# Patient Record
Sex: MALE | Race: BLACK OR AFRICAN AMERICAN | NOT HISPANIC OR LATINO | URBAN - METROPOLITAN AREA
[De-identification: names, ages, dates, MRNs, and addresses within clinical notes are randomized per-mention and may not be internally consistent; named-entity substitution may affect disease eponyms.]

---

## 2023-01-30 VITALS
WEIGHT: 216.05 LBS | RESPIRATION RATE: 18 BRPM | DIASTOLIC BLOOD PRESSURE: 95 MMHG | SYSTOLIC BLOOD PRESSURE: 166 MMHG | OXYGEN SATURATION: 96 % | HEIGHT: 67 IN | HEART RATE: 130 BPM | TEMPERATURE: 98 F

## 2023-01-30 LAB
APTT BLD: 31.2 SEC — SIGNIFICANT CHANGE UP (ref 27.5–35.5)
BASE EXCESS BLDV CALC-SCNC: 0 MMOL/L — SIGNIFICANT CHANGE UP (ref -2–3)
BASOPHILS # BLD AUTO: 0.09 K/UL — SIGNIFICANT CHANGE UP (ref 0–0.2)
BASOPHILS NFR BLD AUTO: 0.6 % — SIGNIFICANT CHANGE UP (ref 0–2)
BUN SERPL-MCNC: 14 MG/DL — SIGNIFICANT CHANGE UP (ref 7–23)
CA-I SERPL-SCNC: 1.22 MMOL/L — SIGNIFICANT CHANGE UP (ref 1.15–1.33)
CALCIUM SERPL-MCNC: 9 MG/DL — SIGNIFICANT CHANGE UP (ref 8.4–10.5)
CO2 BLDV-SCNC: 26.7 MMOL/L — HIGH (ref 22–26)
CO2 SERPL-SCNC: 24 MMOL/L — SIGNIFICANT CHANGE UP (ref 22–31)
CREAT SERPL-MCNC: 1.21 MG/DL — SIGNIFICANT CHANGE UP (ref 0.5–1.3)
EGFR: 68 ML/MIN/1.73M2 — SIGNIFICANT CHANGE UP
EOSINOPHIL # BLD AUTO: 0.12 K/UL — SIGNIFICANT CHANGE UP (ref 0–0.5)
EOSINOPHIL NFR BLD AUTO: 0.7 % — SIGNIFICANT CHANGE UP (ref 0–6)
GAS PNL BLDV: 131 MMOL/L — LOW (ref 136–145)
GAS PNL BLDV: SIGNIFICANT CHANGE UP
GLUCOSE SERPL-MCNC: 259 MG/DL — HIGH (ref 70–99)
HCO3 BLDV-SCNC: 25 MMOL/L — SIGNIFICANT CHANGE UP (ref 22–29)
HCT VFR BLD CALC: 42.7 % — SIGNIFICANT CHANGE UP (ref 39–50)
HGB BLD-MCNC: 14.5 G/DL — SIGNIFICANT CHANGE UP (ref 13–17)
IMM GRANULOCYTES NFR BLD AUTO: 0.4 % — SIGNIFICANT CHANGE UP (ref 0–0.9)
INR BLD: 0.98 — SIGNIFICANT CHANGE UP (ref 0.88–1.16)
LYMPHOCYTES # BLD AUTO: 1.02 K/UL — SIGNIFICANT CHANGE UP (ref 1–3.3)
LYMPHOCYTES # BLD AUTO: 6.3 % — LOW (ref 13–44)
MAGNESIUM SERPL-MCNC: 1.9 MG/DL — SIGNIFICANT CHANGE UP (ref 1.6–2.6)
MCHC RBC-ENTMCNC: 30.3 PG — SIGNIFICANT CHANGE UP (ref 27–34)
MCHC RBC-ENTMCNC: 34 GM/DL — SIGNIFICANT CHANGE UP (ref 32–36)
MCV RBC AUTO: 89.3 FL — SIGNIFICANT CHANGE UP (ref 80–100)
MONOCYTES # BLD AUTO: 1.05 K/UL — HIGH (ref 0–0.9)
MONOCYTES NFR BLD AUTO: 6.5 % — SIGNIFICANT CHANGE UP (ref 2–14)
NEUTROPHILS # BLD AUTO: 13.85 K/UL — HIGH (ref 1.8–7.4)
NEUTROPHILS NFR BLD AUTO: 85.5 % — HIGH (ref 43–77)
NRBC # BLD: 0 /100 WBCS — SIGNIFICANT CHANGE UP (ref 0–0)
PCO2 BLDV: 43 MMHG — SIGNIFICANT CHANGE UP (ref 42–55)
PH BLDV: 7.38 — SIGNIFICANT CHANGE UP (ref 7.32–7.43)
PLATELET # BLD AUTO: 345 K/UL — SIGNIFICANT CHANGE UP (ref 150–400)
PO2 BLDV: 69 MMHG — HIGH (ref 25–45)
POTASSIUM BLDV-SCNC: 4.5 MMOL/L — SIGNIFICANT CHANGE UP (ref 3.5–5.1)
PROTHROM AB SERPL-ACNC: 11.7 SEC — SIGNIFICANT CHANGE UP (ref 10.5–13.4)
RBC # BLD: 4.78 M/UL — SIGNIFICANT CHANGE UP (ref 4.2–5.8)
RBC # FLD: 13.2 % — SIGNIFICANT CHANGE UP (ref 10.3–14.5)
SAO2 % BLDV: 94.3 % — HIGH (ref 67–88)
WBC # BLD: 16.19 K/UL — HIGH (ref 3.8–10.5)
WBC # FLD AUTO: 16.19 K/UL — HIGH (ref 3.8–10.5)

## 2023-01-30 PROCEDURE — 71045 X-RAY EXAM CHEST 1 VIEW: CPT | Mod: 26

## 2023-01-30 PROCEDURE — 99285 EMERGENCY DEPT VISIT HI MDM: CPT

## 2023-01-30 RX ORDER — FUROSEMIDE 40 MG
60 TABLET ORAL ONCE
Refills: 0 | Status: COMPLETED | OUTPATIENT
Start: 2023-01-30 | End: 2023-01-30

## 2023-01-30 RX ORDER — IPRATROPIUM/ALBUTEROL SULFATE 18-103MCG
3 AEROSOL WITH ADAPTER (GRAM) INHALATION
Refills: 0 | Status: COMPLETED | OUTPATIENT
Start: 2023-01-30 | End: 2023-01-30

## 2023-01-30 RX ADMIN — Medication 3 MILLILITER(S): at 23:21

## 2023-01-30 RX ADMIN — Medication 125 MILLIGRAM(S): at 23:21

## 2023-01-30 RX ADMIN — Medication 3 MILLILITER(S): at 23:20

## 2023-01-30 RX ADMIN — Medication 3 MILLILITER(S): at 23:26

## 2023-01-30 RX ADMIN — Medication 60 MILLIGRAM(S): at 23:17

## 2023-01-30 NOTE — ED ADULT NURSE NOTE - OBJECTIVE STATEMENT
Pt presents to ED with shortness of breath. Pt reports feeling like he has had a cold for a few days (congestion/cough). Pt reports his breathing got worse. Hx CHF and reports this feels similar to exacerbations. Denies chest pain, nausea/vomiting, fevers, chills, diarrhea. 's and placed on 3L nc. EKG obtained. Placed on cardiac monitor.

## 2023-01-30 NOTE — ED ADULT TRIAGE NOTE - CHIEF COMPLAINT QUOTE
Pt, with hx of CHF, COPD, HTN, DM, and CAD, presents to ER c/o worsening SOB especially in recumbent position for ~4 days. Pt reports "feeling like I have a cold" and has noticed swelling in BLE. Initial O2 91%, 96% on RA

## 2023-01-31 ENCOUNTER — INPATIENT (INPATIENT)
Facility: HOSPITAL | Age: 63
LOS: 2 days | Discharge: HOME | DRG: 286 | End: 2023-02-03
Attending: INTERNAL MEDICINE | Admitting: INTERNAL MEDICINE
Payer: COMMERCIAL

## 2023-01-31 DIAGNOSIS — I50.23 ACUTE ON CHRONIC SYSTOLIC (CONGESTIVE) HEART FAILURE: ICD-10-CM

## 2023-01-31 DIAGNOSIS — D72.829 ELEVATED WHITE BLOOD CELL COUNT, UNSPECIFIED: ICD-10-CM

## 2023-01-31 DIAGNOSIS — Z98.890 OTHER SPECIFIED POSTPROCEDURAL STATES: Chronic | ICD-10-CM

## 2023-01-31 DIAGNOSIS — E03.9 HYPOTHYROIDISM, UNSPECIFIED: ICD-10-CM

## 2023-01-31 DIAGNOSIS — I10 ESSENTIAL (PRIMARY) HYPERTENSION: ICD-10-CM

## 2023-01-31 DIAGNOSIS — R79.89 OTHER SPECIFIED ABNORMAL FINDINGS OF BLOOD CHEMISTRY: ICD-10-CM

## 2023-01-31 DIAGNOSIS — I25.10 ATHEROSCLEROTIC HEART DISEASE OF NATIVE CORONARY ARTERY WITHOUT ANGINA PECTORIS: ICD-10-CM

## 2023-01-31 DIAGNOSIS — Z95.810 PRESENCE OF AUTOMATIC (IMPLANTABLE) CARDIAC DEFIBRILLATOR: Chronic | ICD-10-CM

## 2023-01-31 DIAGNOSIS — J44.9 CHRONIC OBSTRUCTIVE PULMONARY DISEASE, UNSPECIFIED: ICD-10-CM

## 2023-01-31 DIAGNOSIS — E11.9 TYPE 2 DIABETES MELLITUS WITHOUT COMPLICATIONS: ICD-10-CM

## 2023-01-31 LAB
A1C WITH ESTIMATED AVERAGE GLUCOSE RESULT: 10.1 % — HIGH (ref 4–5.6)
ALBUMIN SERPL ELPH-MCNC: 4.2 G/DL — SIGNIFICANT CHANGE UP (ref 3.3–5)
ALP SERPL-CCNC: 98 U/L — SIGNIFICANT CHANGE UP (ref 40–120)
ALT FLD-CCNC: 18 U/L — SIGNIFICANT CHANGE UP (ref 10–45)
ANION GAP SERPL CALC-SCNC: 11 MMOL/L — SIGNIFICANT CHANGE UP (ref 5–17)
ANION GAP SERPL CALC-SCNC: 11 MMOL/L — SIGNIFICANT CHANGE UP (ref 5–17)
APPEARANCE UR: CLEAR — SIGNIFICANT CHANGE UP
AST SERPL-CCNC: 20 U/L — SIGNIFICANT CHANGE UP (ref 10–40)
BACTERIA # UR AUTO: SIGNIFICANT CHANGE UP /HPF
BILIRUB SERPL-MCNC: 0.6 MG/DL — SIGNIFICANT CHANGE UP (ref 0.2–1.2)
BILIRUB UR-MCNC: NEGATIVE — SIGNIFICANT CHANGE UP
BUN SERPL-MCNC: 17 MG/DL — SIGNIFICANT CHANGE UP (ref 7–23)
CALCIUM SERPL-MCNC: 8.9 MG/DL — SIGNIFICANT CHANGE UP (ref 8.4–10.5)
CHLORIDE SERPL-SCNC: 95 MMOL/L — LOW (ref 96–108)
CHLORIDE SERPL-SCNC: 98 MMOL/L — SIGNIFICANT CHANGE UP (ref 96–108)
CHOLEST SERPL-MCNC: 229 MG/DL — HIGH
CK MB CFR SERPL CALC: 3.8 NG/ML — SIGNIFICANT CHANGE UP (ref 0–6.7)
CK SERPL-CCNC: 256 U/L — HIGH (ref 30–200)
CO2 SERPL-SCNC: 26 MMOL/L — SIGNIFICANT CHANGE UP (ref 22–31)
COLOR SPEC: YELLOW — SIGNIFICANT CHANGE UP
CREAT SERPL-MCNC: 1.31 MG/DL — HIGH (ref 0.5–1.3)
DIFF PNL FLD: ABNORMAL
EGFR: 62 ML/MIN/1.73M2 — SIGNIFICANT CHANGE UP
EPI CELLS # UR: SIGNIFICANT CHANGE UP /HPF (ref 0–5)
ESTIMATED AVERAGE GLUCOSE: 243 MG/DL — HIGH (ref 68–114)
FLUAV AG NPH QL: SIGNIFICANT CHANGE UP
FLUBV AG NPH QL: SIGNIFICANT CHANGE UP
GLUCOSE BLDC GLUCOMTR-MCNC: 293 MG/DL — HIGH (ref 70–99)
GLUCOSE BLDC GLUCOMTR-MCNC: 362 MG/DL — HIGH (ref 70–99)
GLUCOSE BLDC GLUCOMTR-MCNC: 375 MG/DL — HIGH (ref 70–99)
GLUCOSE BLDC GLUCOMTR-MCNC: 397 MG/DL — HIGH (ref 70–99)
GLUCOSE BLDC GLUCOMTR-MCNC: 400 MG/DL — HIGH (ref 70–99)
GLUCOSE BLDC GLUCOMTR-MCNC: 421 MG/DL — HIGH (ref 70–99)
GLUCOSE BLDC GLUCOMTR-MCNC: 434 MG/DL — HIGH (ref 70–99)
GLUCOSE SERPL-MCNC: 394 MG/DL — HIGH (ref 70–99)
GLUCOSE UR QL: 100
HCT VFR BLD CALC: 43.4 % — SIGNIFICANT CHANGE UP (ref 39–50)
HCV AB S/CO SERPL IA: 0.04 S/CO — SIGNIFICANT CHANGE UP
HCV AB SERPL-IMP: SIGNIFICANT CHANGE UP
HDLC SERPL-MCNC: 35 MG/DL — LOW
HGB BLD-MCNC: 15 G/DL — SIGNIFICANT CHANGE UP (ref 13–17)
KETONES UR-MCNC: NEGATIVE — SIGNIFICANT CHANGE UP
LACTATE SERPL-SCNC: 1.3 MMOL/L — SIGNIFICANT CHANGE UP (ref 0.5–2)
LEUKOCYTE ESTERASE UR-ACNC: NEGATIVE — SIGNIFICANT CHANGE UP
LIPID PNL WITH DIRECT LDL SERPL: 177 MG/DL — HIGH
MAGNESIUM SERPL-MCNC: 1.8 MG/DL — SIGNIFICANT CHANGE UP (ref 1.6–2.6)
MCHC RBC-ENTMCNC: 31 PG — SIGNIFICANT CHANGE UP (ref 27–34)
MCHC RBC-ENTMCNC: 34.6 GM/DL — SIGNIFICANT CHANGE UP (ref 32–36)
MCV RBC AUTO: 89.7 FL — SIGNIFICANT CHANGE UP (ref 80–100)
NITRITE UR-MCNC: NEGATIVE — SIGNIFICANT CHANGE UP
NON HDL CHOLESTEROL: 194 MG/DL — HIGH
NRBC # BLD: 0 /100 WBCS — SIGNIFICANT CHANGE UP (ref 0–0)
NT-PROBNP SERPL-SCNC: 3929 PG/ML — HIGH (ref 0–300)
PH UR: 5.5 — SIGNIFICANT CHANGE UP (ref 5–8)
PLATELET # BLD AUTO: 321 K/UL — SIGNIFICANT CHANGE UP (ref 150–400)
POTASSIUM SERPL-MCNC: 4.5 MMOL/L — SIGNIFICANT CHANGE UP (ref 3.5–5.3)
POTASSIUM SERPL-MCNC: 4.8 MMOL/L — SIGNIFICANT CHANGE UP (ref 3.5–5.3)
POTASSIUM SERPL-SCNC: 4.5 MMOL/L — SIGNIFICANT CHANGE UP (ref 3.5–5.3)
POTASSIUM SERPL-SCNC: 4.8 MMOL/L — SIGNIFICANT CHANGE UP (ref 3.5–5.3)
PROT SERPL-MCNC: 7 G/DL — SIGNIFICANT CHANGE UP (ref 6–8.3)
PROT UR-MCNC: NEGATIVE MG/DL — SIGNIFICANT CHANGE UP
RBC # BLD: 4.84 M/UL — SIGNIFICANT CHANGE UP (ref 4.2–5.8)
RBC # FLD: 13.2 % — SIGNIFICANT CHANGE UP (ref 10.3–14.5)
RBC CASTS # UR COMP ASSIST: < 5 /HPF — SIGNIFICANT CHANGE UP
RSV RNA NPH QL NAA+NON-PROBE: SIGNIFICANT CHANGE UP
SARS-COV-2 RNA SPEC QL NAA+PROBE: SIGNIFICANT CHANGE UP
SODIUM SERPL-SCNC: 132 MMOL/L — LOW (ref 135–145)
SODIUM SERPL-SCNC: 133 MMOL/L — LOW (ref 135–145)
SP GR SPEC: 1.01 — SIGNIFICANT CHANGE UP (ref 1–1.03)
T4 AB SER-ACNC: 7.88 UG/DL — SIGNIFICANT CHANGE UP (ref 4.5–11.7)
TRIGL SERPL-MCNC: 83 MG/DL — SIGNIFICANT CHANGE UP
TROPONIN T SERPL-MCNC: 0.03 NG/ML — HIGH (ref 0–0.01)
TROPONIN T SERPL-MCNC: 0.03 NG/ML — HIGH (ref 0–0.01)
TSH SERPL-MCNC: 0.17 UIU/ML — LOW (ref 0.27–4.2)
UROBILINOGEN FLD QL: 0.2 E.U./DL — SIGNIFICANT CHANGE UP
WBC # BLD: 11.92 K/UL — HIGH (ref 3.8–10.5)
WBC # FLD AUTO: 11.92 K/UL — HIGH (ref 3.8–10.5)
WBC UR QL: < 5 /HPF — SIGNIFICANT CHANGE UP

## 2023-01-31 PROCEDURE — 93306 TTE W/DOPPLER COMPLETE: CPT | Mod: 26

## 2023-01-31 PROCEDURE — 99222 1ST HOSP IP/OBS MODERATE 55: CPT

## 2023-01-31 PROCEDURE — 99223 1ST HOSP IP/OBS HIGH 75: CPT

## 2023-01-31 RX ORDER — INSULIN LISPRO 100/ML
8 VIAL (ML) SUBCUTANEOUS
Refills: 0 | Status: DISCONTINUED | OUTPATIENT
Start: 2023-01-31 | End: 2023-01-31

## 2023-01-31 RX ORDER — SODIUM CHLORIDE 9 MG/ML
1000 INJECTION, SOLUTION INTRAVENOUS
Refills: 0 | Status: DISCONTINUED | OUTPATIENT
Start: 2023-01-31 | End: 2023-02-03

## 2023-01-31 RX ORDER — GLUCAGON INJECTION, SOLUTION 0.5 MG/.1ML
1 INJECTION, SOLUTION SUBCUTANEOUS ONCE
Refills: 0 | Status: DISCONTINUED | OUTPATIENT
Start: 2023-01-31 | End: 2023-02-03

## 2023-01-31 RX ORDER — DEXTROSE 50 % IN WATER 50 %
25 SYRINGE (ML) INTRAVENOUS ONCE
Refills: 0 | Status: DISCONTINUED | OUTPATIENT
Start: 2023-01-31 | End: 2023-02-03

## 2023-01-31 RX ORDER — INSULIN LISPRO 100/ML
VIAL (ML) SUBCUTANEOUS
Refills: 0 | Status: DISCONTINUED | OUTPATIENT
Start: 2023-01-31 | End: 2023-01-31

## 2023-01-31 RX ORDER — INSULIN LISPRO 100/ML
VIAL (ML) SUBCUTANEOUS AT BEDTIME
Refills: 0 | Status: DISCONTINUED | OUTPATIENT
Start: 2023-01-31 | End: 2023-02-01

## 2023-01-31 RX ORDER — INSULIN LISPRO 100/ML
10 VIAL (ML) SUBCUTANEOUS ONCE
Refills: 0 | Status: COMPLETED | OUTPATIENT
Start: 2023-01-31 | End: 2023-01-31

## 2023-01-31 RX ORDER — INSULIN LISPRO 100/ML
VIAL (ML) SUBCUTANEOUS AT BEDTIME
Refills: 0 | Status: DISCONTINUED | OUTPATIENT
Start: 2023-01-31 | End: 2023-01-31

## 2023-01-31 RX ORDER — INSULIN LISPRO 100/ML
3 VIAL (ML) SUBCUTANEOUS ONCE
Refills: 0 | Status: COMPLETED | OUTPATIENT
Start: 2023-01-31 | End: 2023-01-31

## 2023-01-31 RX ORDER — IPRATROPIUM/ALBUTEROL SULFATE 18-103MCG
3 AEROSOL WITH ADAPTER (GRAM) INHALATION EVERY 6 HOURS
Refills: 0 | Status: DISCONTINUED | OUTPATIENT
Start: 2023-01-31 | End: 2023-02-03

## 2023-01-31 RX ORDER — INFLUENZA VIRUS VACCINE 15; 15; 15; 15 UG/.5ML; UG/.5ML; UG/.5ML; UG/.5ML
0.5 SUSPENSION INTRAMUSCULAR ONCE
Refills: 0 | Status: DISCONTINUED | OUTPATIENT
Start: 2023-01-31 | End: 2023-02-03

## 2023-01-31 RX ORDER — INSULIN LISPRO 100/ML
10 VIAL (ML) SUBCUTANEOUS
Refills: 0 | Status: DISCONTINUED | OUTPATIENT
Start: 2023-01-31 | End: 2023-02-01

## 2023-01-31 RX ORDER — DEXTROSE 50 % IN WATER 50 %
12.5 SYRINGE (ML) INTRAVENOUS ONCE
Refills: 0 | Status: DISCONTINUED | OUTPATIENT
Start: 2023-01-31 | End: 2023-02-03

## 2023-01-31 RX ORDER — INSULIN GLARGINE 100 [IU]/ML
30 INJECTION, SOLUTION SUBCUTANEOUS AT BEDTIME
Refills: 0 | Status: DISCONTINUED | OUTPATIENT
Start: 2023-01-31 | End: 2023-02-01

## 2023-01-31 RX ORDER — ENOXAPARIN SODIUM 100 MG/ML
40 INJECTION SUBCUTANEOUS EVERY 24 HOURS
Refills: 0 | Status: DISCONTINUED | OUTPATIENT
Start: 2023-01-31 | End: 2023-02-03

## 2023-01-31 RX ORDER — DEXTROSE 50 % IN WATER 50 %
15 SYRINGE (ML) INTRAVENOUS ONCE
Refills: 0 | Status: DISCONTINUED | OUTPATIENT
Start: 2023-01-31 | End: 2023-02-03

## 2023-01-31 RX ORDER — FUROSEMIDE 40 MG
40 TABLET ORAL
Refills: 0 | Status: DISCONTINUED | OUTPATIENT
Start: 2023-01-31 | End: 2023-02-01

## 2023-01-31 RX ADMIN — Medication 5: at 17:29

## 2023-01-31 RX ADMIN — ENOXAPARIN SODIUM 40 MILLIGRAM(S): 100 INJECTION SUBCUTANEOUS at 22:00

## 2023-01-31 RX ADMIN — Medication 8 UNIT(S): at 17:30

## 2023-01-31 RX ADMIN — Medication 5: at 06:29

## 2023-01-31 RX ADMIN — Medication 3 UNIT(S): at 10:41

## 2023-01-31 RX ADMIN — INSULIN GLARGINE 30 UNIT(S): 100 INJECTION, SOLUTION SUBCUTANEOUS at 22:00

## 2023-01-31 RX ADMIN — Medication 2: at 21:59

## 2023-01-31 RX ADMIN — Medication 40 MILLIGRAM(S): at 14:34

## 2023-01-31 RX ADMIN — Medication 10 UNIT(S): at 07:12

## 2023-01-31 RX ADMIN — Medication 40 MILLIGRAM(S): at 10:42

## 2023-01-31 RX ADMIN — Medication 6: at 12:39

## 2023-01-31 NOTE — H&P ADULT - NSICDXPASTSURGICALHX_GEN_ALL_CORE_FT
PAST SURGICAL HISTORY:  S/P cardiac cath     S/P ICD (internal cardiac defibrillator) procedure

## 2023-01-31 NOTE — ED PROVIDER NOTE - PHYSICAL EXAMINATION
Constitutional : Well appearing, non-toxic, no acute distress. awake, alert, oriented to person, place, time/situation.  Head : head normocephalic, atraumatic  EENMT : eyes clear bilaterally, PERRL, EOMI. airway patent. moist mucous membranes. neck supple.  Cardiac : Normal rate, regular rhythm. No murmur appreciated, 1+ edema bilateral LE ankles distally  Resp : crackles bilateral bases. no wheezing. Respirations even and unlabored.   Gastro : abdomen soft, nontender, nondistended. no rebound or guarding. no CVAT.  MSK :  range of motion is not limited, no muscle or joint tenderness  Vasc : Extremities warm and well perfused. 2+ radial and DP pulses. cap refill <2 seconds  Neuro : Alert and oriented, CNII-XII grossly intact, no focal deficits, no motor or sensory deficits.  Skin : Skin normal color for race, warm, dry and intact. No evidence of rash.  Psych : Alert and oriented to person, place, time/situation. normal mood and affect. no apparent risk to self or others.

## 2023-01-31 NOTE — H&P ADULT - ASSESSMENT
61 yo male, POOR HISTORIAN, w/ PMH of HTN, DM II, CHF (EF unknown), CAD s/p prior cardiac catheterizations with stents, and COPD (not O2 dependent), who presented to [facility] ED 1/30 c/o worsening SOB x 4 days. BNP elevated and CXR with evidence of interstitial edema. Patient admitted to [facility] for suspected acute on chronic heart failure exacerbation requiring IV diuresis.

## 2023-01-31 NOTE — H&P ADULT - HISTORY OF PRESENT ILLNESS
61 yo male w/ PMH of HTN, DM II, CHF (EF unknown), CAD, and COPD (not O2 dependent), who presented to [facility] ED 1/30 c/o worsening SOB x 4 days. SOB worse with lying down, improved with sitting up. Associated with lower extremity swelling. Denies chest pain, palpitations, lightheadedness, dizziness. Denies fever, sweats, chills.    In ED: VS T 98.3, , /84, RR 18, spO2 94% on RA. Labs WBC 16.19, Na 133, K 4.5, Mg 1.9, , CKMB 3.8, Trop 0.03, BNP 3929, VBG: pH 7.38/43/69/25, SARS-CoV-2 negative. CXR 1/30/23: Official read pending but appears to have evidence of interstitial edema and an AICD. Given IV Lasix 60 mg, Albueterol/Ipatropium nebulizer x 1, and Solumedrol 125mg x1.  61 yo male, POOR HISTORIAN, w/ PMH of HTN, DM II, CHF (EF unknown), CAD s/p prior cardiac catheterizations with stents, and COPD (not O2 dependent), who presented to [facility] ED 1/30 c/o worsening SOB x 4 days. SOB worse with lying down, improved with sitting up. Associated with lower extremity swelling. Denies chest pain, palpitations, lightheadedness, dizziness. Denies fever, sweats, chills.    In ED: VS T 98.3, , /84, RR 18, spO2 94% on RA. Labs WBC 16.19, Na 133, K 4.5, Mg 1.9, , CKMB 3.8, Trop 0.03, BNP 3929, VBG: pH 7.38/43/69/25, SARS-CoV-2 negative. CXR 1/30/23: Official read pending but appears to have evidence of interstitial edema and an AICD. Given IV Lasix 60 mg, Albueterol/Ipatropium nebulizer x 1, and Solumedrol 125mg x1.  63 yo male, POOR HISTORIAN, w/ PMH of HTN, DM II, CHF (EF unknown), CAD s/p prior cardiac catheterizations with stents, and COPD (not O2 dependent), who presented to [facility] ED 1/30 c/o worsening SOB x 4 days. SOB worse with lying down, improved with sitting up. Associated with lower extremity swelling. Attempted to increase dose of lasix to treat edema, however ran out at home. Denies chest pain, palpitations, lightheadedness, dizziness. Denies fever, sweats, chills.    In ED: VS T 98.3, , /84, RR 18, spO2 94% on RA. Labs WBC 16.19, Na 133, K 4.5, Mg 1.9, , CKMB 3.8, Trop 0.03, BNP 3929, VBG: pH 7.38/43/69/25, SARS-CoV-2 negative. CXR 1/30/23: Official read pending but appears to have evidence of interstitial edema and an AICD. Given IV Lasix 60 mg, Albueterol/Ipatropium nebulizer x 1, and Solumedrol 125mg x1.  63 yo male, POOR HISTORIAN, w/ PMH of HTN, DM II, CHF (EF unknown), CAD s/p prior cardiac catheterizations with stents, and COPD (not O2 dependent), who presented to [facility] ED 1/30 c/o worsening SOB x 4 days. SOB worse with lying down, improved with sitting up. Associated with lower extremity swelling. Attempted to increase dose of lasix to treat edema, however ran out at home. Denies chest pain, palpitations, lightheadedness, dizziness. Denies fever, sweats, chills.    In ED: VS T 98.3, , /84, RR 18, spO2 94% on RA. EKG: NSR with nonspecific ST changes in v5-v6. Labs: WBC 16.19, Na 133, K 4.5, Mg 1.9, , CKMB 3.8, Trop 0.03, BNP 3929, VBG: pH 7.38/43/69/25, SARS-CoV-2 negative. CXR 1/30/23: Official read pending but appears to have evidence of interstitial edema and an AICD. Given IV Lasix 60 mg, Albueterol/Ipatropium nebulizer x 1, and Solumedrol 125mg x1.

## 2023-01-31 NOTE — H&P ADULT - PROBLEM SELECTOR PLAN 2
WBC 16. 19 on presentation  -- Denies fevers, sweats, chills  -- Monitor for signs of infection   -- Blood cultures and urine culture ordered in ED, follow up results WBC 16.19 on presentation  -- Admits to productive cough. Denies fevers, sweats, chills.   -- Monitor for signs of infection   -- Blood cultures and urine culture ordered in ED, follow up results  -- follow up lactate WBC 16.19 on presentation  -- Admits to productive cough. Denies fevers, sweats, chills.   -- Monitor for signs of infection   -- Blood cultures and urine culture ordered in ED, follow up results  -- Follow up lactate  -- Suspicion for PNA, initiate CAP abx coverage pending official CXR read

## 2023-01-31 NOTE — H&P ADULT - PROBLEM SELECTOR PLAN 6
-- Follow up A1C in AM    DVT Proph:   F:  E:  N: -- Follow up A1C in AM  -- Hold home oral anti-glycemics  -- Insulin sliding scale, carb controlled diet when no longerNPO    DVT Proph: Lovenox QS  F: Oral intake  E: K>4, Mg > 2  N: NPO for testing

## 2023-01-31 NOTE — CONSULT NOTE ADULT - PROBLEM SELECTOR RECOMMENDATION 2
TSH 0.173 and total t4 7.88. Received solumedrol 125mg night before labs drawn. TSH 0.173 and total t4 7.88. TSH artificially low due to solumedrol 125mg given the night before labs drawn.  Will repeat TFTs in a few days.  Please obtain thyroid US.

## 2023-01-31 NOTE — CONSULT NOTE ADULT - PROBLEM SELECTOR RECOMMENDATION 9
A1C: 10.1%  Weight: 96kg BMI 33  Cr/GRF: 1.3/66  EF: 33%    Please continue lantus       units at night / morning.  Please continue lispro      units before each meal.  Please continue lispro moderate / low dose sliding scale four times daily with meals and at bedtime    Pt's fingerstick glucose goal is 100-180    Will continue to monitor     For discharge, pt can continue    Pt can follow up at discharge with [facility] by calling  to make an appointment.   Will discuss case with Dr. Norman   and update primary team Uncontrolled complicated with steroid induced hyperglycemia. Solumedrol 125mg given once on 1/30.  A1C: 10.1%  Weight: 96kg BMI 33  Cr/GRF: 1.3/66  EF: 33%    Please continue lantus       units at night / morning.  Please continue lispro      units before each meal.  Please continue lispro moderate / low dose sliding scale four times daily with meals and at bedtime    Pt's fingerstick glucose goal is 100-180    Will continue to monitor     For discharge, pt can continue    Pt can follow up at discharge with [facility] by calling  to make an appointment.   Will discuss case with Dr. Norman   and update primary team Uncontrolled complicated with steroid induced hyperglycemia. Solumedrol 125mg given once on 1/30.  A1C: 10.1%  Weight: 96kg BMI 33  Cr/GRF: 1.3/66  EF: 33%    Please start lantus  30     units at night.  Please give lispro   10   units before each meal.  Please continue lispro moderate  dose sliding scale four times daily with meals and at bedtime    Pt's fingerstick glucose goal is 100-180    Will continue to monitor     For discharge, pt can continue    Pt can follow up at discharge with [facility] by calling  to make an appointment.   Will discuss case with Dr. Norman   and update primary team

## 2023-01-31 NOTE — ED PROVIDER NOTE - CLINICAL SUMMARY MEDICAL DECISION MAKING FREE TEXT BOX
chf exacerbation vs copd exacerbation  lungs w/o wheezing  will trial duonebs, steroids. give 60mg lasix  reassess HTN, DM II, CHF (EF unknown), CAD s/p prior cardiac catheterizations with stents, and COPD (not O2 dependent). here w sob x 4 days. leg swelling. has been off lasix.   pt nontoxic appearing, tachycardic to 130s on arrival. O2 91% on room air, on 2L NC 96%, crackles bilateral bases and 1+ edema bilateral LE  suspect chf exacerbation w recent off lasix and exam.   possible component of copd exacerbation w recent illness. lungs w/o wheezing. less likely pna.   plan - labs, ecg, cxr  will trial duonebs, steroids. give 60mg lasix  reassess

## 2023-01-31 NOTE — H&P ADULT - PROBLEM SELECTOR PLAN 4
Normotensive, SBP in   -- Continue s/p prior cardiac catheterization with stents at [facility] and [facility]  -- Obtain collaterals in AM   -- Continue ASA 81 mg. Unclear if patient on Plavix s/p prior cardiac catheterization with stents at [facility] and [facility]  -- Obtain collaterals in AM   -- Continue ASA 81 mg. Unclear if patient on Plavix  -- Patient reports being on Coreg 25 mg QD s/p prior cardiac catheterization with stents at [facility] and [facility]  -- Obtain collaterals in AM   -- Continue ASA 81 mg. Unclear if patient on Plavix  -- Patient reports being on Coreg 25 mg QD, however unsure, med rec to be completed in AM

## 2023-01-31 NOTE — CONSULT NOTE ADULT - ASSESSMENT
61 yo male, POOR HISTORIAN, w/ PMH of HTN, DM II, CHF (EF unknown), CAD s/p prior cardiac catheterizations with stents, and COPD (not O2 dependent), who presented to [facility] ED 1/30 c/o worsening SOB x 4 days. SOB worse with lying down, improved with sitting up. Associated with lower extremity swelling. Attempted to increase dose of lasix to treat edema, however ran out at home. Denies chest pain, palpitations, lightheadedness, dizziness. Denies fever, sweats, chills.   61 yo male, POOR HISTORIAN, w/ PMH of HTN, DM II, CHF (EF unknown), CAD s/p prior cardiac catheterizations with stents, and COPD (not O2 dependent), who presented to [facility] ED 1/30 c/o worsening SOB x 4 days and lower extremity edema admitted  for diuresis, also with uncontrolled T2DM with steroid induced hyperglycemia and thyroid goiter.

## 2023-01-31 NOTE — CONSULT NOTE ADULT - NS ATTEND AMEND GEN_ALL_CORE FT
Pt seen on rounds this afternoon.  Consult requested to evaluate abnormal TFTs and uncontrolled type 2 DM.  62-yo man with a history of CAD (s/p PCIs), HTN and moderate COPD who presented with SOB, orthopnea and LE edema not responsive to increased doses of Lasix at home.  Has been admitted for treatment of CHF and likely coronary angiography.  DM was diagnosed at age 48, and has been insulin requiring for the past 5 years.  Regimen at home is 28 units of Tresiba qhs plus 1.8 mg Victoza qAM.  Admits to taking his meds inconsistently, and also does not monitor fingersticks at home.  Glucoses was 259 on presentation but has since risen into the 300-400 range after he received 125 mg Solu-Medrol in the ED.  Diet is as discussed above--main problem is his regular intake of baked goods.  His thyroid gland enlargement has been present for many years.  It appears that he has had biopsies at least one nodule in the past--apparently benign.  Seems to have mild dysphagia for pills.  Appears to have a "wet" and somewhat hoarse voice but says that this has been present for years.  Thyroid gland is 3X normal size, with an enlarged R lobe.  Total T4 is normal, but TSH is suppressed to 0.173 uU/ml  --The suppressed TSH is likely due to the effect of the Solu-Medrol, but needs to be repeated, along with free T4 and free T3 levels  --Should have thyroid ultrasound  --Should also have an ENT evaluation to assess vocal cords  --For his diabetes, would start on 30 Lantus qhs, premeal lispro 10 units Pt seen on rounds this afternoon.  Consult requested to evaluate abnormal TFTs and uncontrolled type 2 DM.  62-yo man with a history of CAD (s/p PCIs), HTN and moderate COPD who presented with SOB, orthopnea and LE edema not responsive to increased doses of Lasix at home.  Has been admitted for treatment of CHF and likely coronary angiography.  DM was diagnosed at age 48, and has been insulin requiring for the past 5 years.  Regimen at home is 28 units of Tresiba qhs plus 1.8 mg Victoza qAM.  Admits to taking his meds inconsistently, and also does not monitor fingersticks at home.  Glucoses was 259 on presentation but has since risen into the 300-400 range after he received 125 mg Solu-Medrol in the ED.  Diet is as discussed above--main problem is his regular intake of baked goods.  His thyroid gland enlargement has been present for many years.  It appears that he has had biopsies at least one nodule in the past--apparently benign.  Seems to have mild dysphagia for pills.  Appears to have a "wet" and somewhat hoarse voice but says that this has been present for years.  Thyroid gland is 3X normal size, with the left lobe ?? slightly larger than the right.  Total T4 is normal, but TSH is suppressed to 0.173 uU/ml  --The suppressed TSH is likely due to the effect of the Solu-Medrol, but needs to be repeated, along with free T4 and free T3 levels  --Should have thyroid ultrasound  --Should also have an ENT evaluation to assess vocal cords  --For his diabetes, would start on 30 Lantus qhs, premeal lispro 10 units

## 2023-01-31 NOTE — H&P ADULT - NSHPLABSRESULTS_GEN_ALL_CORE
14.5   16.19 )-----------( 345      ( 30 Jan 2023 23:32 )             42.7       01-30    133<L>  |  98  |  14  ----------------------------<  259<H>  4.5   |  24  |  1.21    Ca    9.0      30 Jan 2023 23:32  Mg     1.9     01-30    TPro  7.0  /  Alb  4.2  /  TBili  0.6  /  DBili  x   /  AST  20  /  ALT  18  /  AlkPhos  98  01-30      PT/INR - ( 30 Jan 2023 23:32 )   PT: 11.7 sec;   INR: 0.98          PTT - ( 30 Jan 2023 23:32 )  PTT:31.2 sec    CARDIAC MARKERS ( 30 Jan 2023 23:32 )  x     / 0.03 ng/mL / 256 U/L / x     / 3.8 ng/mL            EKG:

## 2023-01-31 NOTE — H&P ADULT - PROBLEM SELECTOR PLAN 1
Hx of CHF (EF unknown). Presented w/ SOB and LE edema  -- PE: crackles, JVD, edema  -- Trop 0.03, BNP 3929,  , CKMB 3.8, EKG nonischemic  -- s/p IV Lasix 60 mg in ED  -- TTE ordered, follow up results  -- Continue IV diuresis with  -- Follow up lipid profile, A1C, TSH/T4 in AM Hx of CHF (EF unknown). Presented w/ SOB and LE edema. Ran out of Lasix at home due to increasing dose  -- PE: bibasilar crackles, no JVD, 1+ edema  -- Trop 0.03, BNP 3929,  , CKMB 3.8, EKG nonischemic  -- s/p IV Lasix 60 mg in ED  -- TTE ordered, follow up results  -- Continue IV diuresis with IV Lasix 20mg BID   -- Follow up lipid profile, A1C, TSH/T4 in AM Hx of CHF (EF unknown). Presented w/ SOB and LE edema. Ran out of Lasix at home due to increasing dose  -- PE: bibasilar crackles, no JVD, 1+ edema, spO2 in 90s on 3L  -- Trop 0.03, BNP 3929,  , CKMB 3.8, EKG nonischemic  -- s/p IV Lasix 60 mg in ED  -- TTE ordered, follow up results  -- Continue IV diuresis with IV Lasix 20mg BID   -- Follow up lipid profile, A1C, TSH/T4 in AM

## 2023-01-31 NOTE — ED PROVIDER NOTE - PROGRESS NOTE DETAILS
trop 0.03, bnp 4k s/p lasix pt has urinated >800cc. feeling significantly better.   still on 2-3L NC and sats around 95%.  admitted to cards.   ordered for blood cultures, lactate. rpt trop. trop 0.03, bnp 4k  wbc count elevated to 16  ecg nonischemic   cxr w pulmonary congestion

## 2023-01-31 NOTE — H&P ADULT - NSICDXPASTMEDICALHX_GEN_ALL_CORE_FT
PAST MEDICAL HISTORY:  Acute systolic congestive heart failure     CAD (coronary artery disease)     DM (diabetes mellitus)     HTN (hypertension)

## 2023-01-31 NOTE — CONSULT NOTE ADULT - SUBJECTIVE AND OBJECTIVE BOX
HPI:61 yo male, POOR HISTORIAN, w/ PMH of HTN, DM II, CHF (EF unknown), CAD s/p prior cardiac catheterizations with stents, and COPD (not O2 dependent), who presented to [facility] ED  c/o worsening SOB x 4 days. SOB worse with lying down, improved with sitting up. Associated with lower extremity swelling. Attempted to increase dose of lasix to treat edema, however ran out at home. Denies chest pain, palpitations, lightheadedness, dizziness. Denies fever, sweats, chills.    In ED: VS T 98.3, , /84, RR 18, spO2 94% on RA. EKG: NSR with nonspecific ST changes in v5-v6. Labs: WBC 16.19, Na 133, K 4.5, Mg 1.9, , CKMB 3.8, Trop 0.03, BNP 3929, VBG: pH 7.38/43/69/25, SARS-CoV-2 negative. CXR 23: Official read pending but appears to have evidence of interstitial edema and an AICD. Given IV Lasix 60 mg, Albueterol/Ipatropium nebulizer x 1, and Solumedrol 125mg x1.   FSG & insulin:    Dinner FSG   Lispro   +    Ate  Bedtime FSG     Lantus      and Lispro         Breakfast FSG   Lispro    +    Ate  Lunch FSG      Lispro    +    Ate      Age at Dx:  How dx:  Hx and duration of insulin:  Current Therapy:  Hx of other regimens:    Home FSG:  Fasting  Lunch  Dinner  Bed    Diet:  Exercise:    Hx of hypoglycemia:  Hx of DKA/HHS:  Complications:  Outpatient Endo:    FH:  DM:  Thyroid:  Autoimmune:  Other:    SH:  Smoking  Etoh:  Recreational Drugs:  Social Life:    PMH & Surgical Hx:CHF EXACERBATION    Handoff    MEWS Score    No pertinent past medical history    HTN (hypertension)    DM (diabetes mellitus)    CAD (coronary artery disease)    Acute systolic congestive heart failure    CHF exacerbation    Acute on chronic systolic congestive heart failure    Leukocytosis    HTN (hypertension)    Chronic obstructive pulmonary disease (COPD)    DM (diabetes mellitus)    CAD (coronary artery disease)    S/P cardiac cath    S/P ICD (internal cardiac defibrillator) procedure    MED EVAL    Room Service Assist    [email or web address]            Current Meds:  albuterol/ipratropium for Nebulization 3 milliLiter(s) Nebulizer every 6 hours PRN  dextrose 5%. 1000 milliLiter(s) IV Continuous <Continuous>  dextrose 5%. 1000 milliLiter(s) IV Continuous <Continuous>  dextrose 50% Injectable 25 Gram(s) IV Push once  dextrose 50% Injectable 12.5 Gram(s) IV Push once  dextrose 50% Injectable 25 Gram(s) IV Push once  dextrose Oral Gel 15 Gram(s) Oral once PRN  enoxaparin Injectable 40 milliGRAM(s) SubCutaneous every 24 hours  furosemide   Injectable 40 milliGRAM(s) IV Push two times a day  glucagon  Injectable 1 milliGRAM(s) IntraMuscular once  influenza   Vaccine 0.5 milliLiter(s) IntraMuscular once  insulin lispro (ADMELOG) corrective regimen sliding scale   SubCutaneous three times a day before meals  insulin lispro (ADMELOG) corrective regimen sliding scale   SubCutaneous at bedtime      Allergies:  No Known Allergies      ROS:  Denies the following except as indicated.    General: weight loss/weight gain, decreased appetite, fatigue  Eyes: Blurry vision, double vision, visual changes  ENT: Throat pain, changes in voice,   CV: palpitations, SOB, CP, cough  GI: NVD, difficulty swallowing, abdominal pain  : polyuria, dysuria  Endo: abnormal menses, temperature intolerance, decreased libido  MSK: weakness, joint pain  Skin: rash, dryness, diaphoresis  Heme: Easy bruising, bleeding  Neuro: HA, dizziness, lightheadedness, numbness/ tingling  Psych: Anxiety, Depression    Vital Signs Last 24 Hrs  T(C): 36.2 (2023 14:02), Max: 36.9 (2023 21:54)  T(F): 97.2 (2023 14:02), Max: 98.4 (2023 21:54)  HR: 112 (2023 10:49) (98 - 130)  BP: 140/90 (2023 10:49) (129/77 - 166/95)  BP(mean): --  RR: 18 (2023 06:41) (18 - 20)  SpO2: 93% (2023 10:49) (93% - 98%)    Parameters below as of 2023 10:49  Patient On (Oxygen Delivery Method): room air      Height (cm): 170.2 ( @ 06:41)  Weight (kg): 96.5 ( @ 06:41)  BMI (kg/m2): 33.3 ( @ 06:41)      Constitutional: wn/wd in NAD.   HEENT: NCAT, MMM, OP clear, EOMI, , no proptosis or lid retraction  Neck: no thyromegaly or palpable thyroid nodules   Respiratory: lungs CTAB.  Cardiovascular: regular rhythm, normal S1 and S2, no audible murmurs, no peripheral edema  GI: soft, NT/ND, no masses/HSM appreciated.  Neurology: no tremors, DTR 2+  Skin: no visible rashes/lesions. no acanthosis nigricans. no hyperlipotrophy. no cushing's stigmata.  Psychiatric: AAO x 3, normal affect/mood.  Ext: radial pulses intact, DP pulses intact, extremities warm, no cyanosis, clubbing or edema.       LABS:                        15.0   11.92 )-----------( 321      ( 2023 05:30 )             43.4         132<L>  |  95<L>  |  17  ----------------------------<  394<H>  4.8   |  26  |  1.31<H>    Ca    8.9      2023 05:30  Mg     1.8         TPro  7.0  /  Alb  4.2  /  TBili  0.6  /  DBili  x   /  AST  20  /  ALT  18  /  AlkPhos  98      PT/INR - ( 2023 23:32 )   PT: 11.7 sec;   INR: 0.98          PTT - ( 2023 23:32 )  PTT:31.2 sec  Urinalysis Basic - ( 2023 03:40 )    Color: Yellow / Appearance: Clear / S.010 / pH: x  Gluc: x / Ketone: NEGATIVE  / Bili: Negative / Urobili: 0.2 E.U./dL   Blood: x / Protein: NEGATIVE mg/dL / Nitrite: NEGATIVE   Leuk Esterase: NEGATIVE / RBC: < 5 /HPF / WBC < 5 /HPF   Sq Epi: x / Non Sq Epi: 0-5 /HPF / Bacteria: None /HPF        Thyroid Stimulating Hormone, Serum: 0.173 ( @ 05:30)      RADIOLOGY & ADDITIONAL STUDIES:  CAPILLARY BLOOD GLUCOSE      POCT Blood Glucose.: 434 mg/dL (2023 11:33)  POCT Blood Glucose.: 362 mg/dL (2023 08:14)  POCT Blood Glucose.: 397 mg/dL (2023 07:39)  POCT Blood Glucose.: 421 mg/dL (2023 06:43)  POCT Blood Glucose.: 375 mg/dL (2023 06:22)        A/P:62y Male    1.  DM -   A1C:  Weight:  Cr/GRF:  ER:    Please continue lantus       units at night / morning.  Please continue lispro      units before each meal.  Please continue lispro moderate / low dose sliding scale four times daily with meals and at bedtime    Pt's fingerstick glucose goal is     Will continue to monitor     For discharge, pt can continue    Pt can follow up at discharge with [facility] by calling  to make an appointment.   Will discuss case with     and update primary team    To Make an appointment at [address] [place] [address] for the patient, either:  1. Send a STAT task via [employer] to Roya Florentino or Ladonna Holliday (office managers)   OR  2. Call supervisor's line. P: [phone] (do not give this number to patient). VM is checked periodically.  In the message, specify that this is a hospital discharge follow-up, and that the appt can be made with a NP if there is no timely MD availability.    REMINDERS FOR INSULIN/DIABETES SUPPLIES at DISCHARGE:  INSULIN:   Long actin/Basal Insulin: Examples: Toujeo, Basaglar, Tresiba, Lantus   Short acting/Bolus Insulin: Humalog, Admelog, Novolog  Please ensure that BOTH short acting and long acting insulin are prescribed in the same preparation (Ex: PEN vs VIAL/SOLUTION)     TESTING SUPPLIES:   All glucometer supplies should be written as generic to avoid issues with insurance. Use the free text option in sunrise prescription writer, and type in glucometer test strips, lancets, etc to order.    If sending patient home on insulin PEN, please send:   •	BD marbella insulin pen needles for use up to 4 times daily (total quantity 100)  •	Lancets for use up to 4 times daily (total quantity 100)  •	Glucometer Test strips for use up to 4 times daily (total quantity 100)  •	Alcohol swabs for use up to 4 times daily (total quantity 100)  •	Glucometer (If provided by hospital, still provide scripts for lancets, test strips, and swabs)  If sending patient home on insulin VIAL, please send:   •	Insulin syringes (6mm) - for use up to 4 times daily (total quantity 100)  •	Lancets for use up to 4 times daily (total quantity 100)  •	Generic Glucometer Test strips for use up to 4 times daily (total quantity 100)  •	Alcohol swabs for use up to 4 times daily (total quantity 100)  •	Generic Glucometer (If provided by hospital, still provide scripts for lancets, test strips, and swabs)  •	Do not specify brand for testing supplies (such as contour, freestyle, one touch etc) that way the pharmacy has the freedom to pick and change according to what the insurance dictates.  For patients without insurance:   •	Provide social work with appropriate scripts so they may obtain 1 week of samples  •	Provide with glucometer. Glucometers are located at various nursing stations, the nursing office, education, and endocrine fellows office.  •	Please make appointment with Genie Ruiz NP or More Torres RN and DORINDA Worrell at the [address] [place] [address] endocrinology clinic. They can see patients without insurance, provide appropriate samples, and assist in getting insurance coverage.     PREFERRED PHARMACY:  [facility] (located on 1st floor next to admitting)  P: [phone]  Hours: M – F 8AM – 8PM, Sat 8AM – 4PM, Sun—closed  If not using [facility], please follow up with chosen pharmacy to ensure insulin prescribed is covered.        HPI:61 yo male, POOR HISTORIAN, w/ PMH of HTN, DM II, CHF (EF unknown), CAD s/p prior cardiac catheterizations with stents, and COPD (not O2 dependent), who presented to [facility] ED  c/o worsening SOB x 4 days. SOB worse with lying down, improved with sitting up. Associated with lower extremity swelling. Attempted to increase dose of lasix to treat edema, however ran out at home. Denies chest pain, palpitations, lightheadedness, dizziness. Denies fever, sweats, chills.    In ED: VS T 98.3, , /84, RR 18, spO2 94% on RA. EKG: NSR with nonspecific ST changes in v5-v6. Labs: WBC 16.19, Na 133, K 4.5, Mg 1.9, , CKMB 3.8, Trop 0.03, BNP 3929, VBG: pH 7.38/43/69/25, SARS-CoV-2 negative. CXR 23: Official read pending but appears to have evidence of interstitial edema and an AICD. Given IV Lasix 60 mg, Albueterol/Ipatropium nebulizer x 1, and Solumedrol 125mg x1.     TSH 0.173 and total t4 7.88  A1C 10.1%    FSG & insulin:    Dinner FSG   Lispro   +    Ate  Bedtime FSG     Lantus      and Lispro         Breakfast FSG   Lispro    +    Ate  Lunch FSG      Lispro    +    Ate      Age at Dx:  How dx:  Hx and duration of insulin:  Current Therapy:  Hx of other regimens:    Home FSG:  Fasting  Lunch  Dinner  Bed    Diet:  Exercise:    Hx of hypoglycemia:  Hx of DKA/HHS:  Complications:  Outpatient Endo:    FH:  DM:  Thyroid:  Autoimmune:  Other:    SH:  Smoking  Etoh:  Recreational Drugs:  Social Life:    PMH & Surgical Hx:  HF EXACERBATION    Handoff    MEWS Score    No pertinent past medical history    HTN (hypertension)    DM (diabetes mellitus)    CAD (coronary artery disease)    Acute systolic congestive heart failure    CHF exacerbation    Acute on chronic systolic congestive heart failure    Leukocytosis    HTN (hypertension)    Chronic obstructive pulmonary disease (COPD)    DM (diabetes mellitus)    CAD (coronary artery disease)    S/P cardiac cath    S/P ICD (internal cardiac defibrillator) procedure    MED EVAL    Room Service Assist    SysAdmin_VisitLink            Current Meds:  albuterol/ipratropium for Nebulization 3 milliLiter(s) Nebulizer every 6 hours PRN  dextrose 5%. 1000 milliLiter(s) IV Continuous <Continuous>  dextrose 5%. 1000 milliLiter(s) IV Continuous <Continuous>  dextrose 50% Injectable 25 Gram(s) IV Push once  dextrose 50% Injectable 12.5 Gram(s) IV Push once  dextrose 50% Injectable 25 Gram(s) IV Push once  dextrose Oral Gel 15 Gram(s) Oral once PRN  enoxaparin Injectable 40 milliGRAM(s) SubCutaneous every 24 hours  furosemide   Injectable 40 milliGRAM(s) IV Push two times a day  glucagon  Injectable 1 milliGRAM(s) IntraMuscular once  influenza   Vaccine 0.5 milliLiter(s) IntraMuscular once  insulin lispro (ADMELOG) corrective regimen sliding scale   SubCutaneous three times a day before meals  insulin lispro (ADMELOG) corrective regimen sliding scale   SubCutaneous at bedtime      Allergies:  No Known Allergies      ROS:  Denies the following except as indicated.    General: weight loss/weight gain, decreased appetite, fatigue  Eyes: Blurry vision, double vision, visual changes  ENT: Throat pain, changes in voice,   CV: palpitations, SOB, CP, cough  GI: NVD, difficulty swallowing, abdominal pain  : polyuria, dysuria  Endo: abnormal menses, temperature intolerance, decreased libido  MSK: weakness, joint pain  Skin: rash, dryness, diaphoresis  Heme: Easy bruising, bleeding  Neuro: HA, dizziness, lightheadedness, numbness/ tingling  Psych: Anxiety, Depression    Vital Signs Last 24 Hrs  T(C): 36.2 (2023 14:02), Max: 36.9 (2023 21:54)  T(F): 97.2 (2023 14:02), Max: 98.4 (2023 21:54)  HR: 112 (2023 10:49) (98 - 130)  BP: 140/90 (2023 10:49) (129/77 - 166/95)  BP(mean): --  RR: 18 (2023 06:41) (18 - 20)  SpO2: 93% (2023 10:49) (93% - 98%)    Parameters below as of 2023 10:49  Patient On (Oxygen Delivery Method): room air      Height (cm): 170.2 ( @ 06:41)  Weight (kg): 96.5 ( @ 06:41)  BMI (kg/m2): 33.3 ( @ 06:41)      Constitutional: wn/wd in NAD.   HEENT: NCAT, MMM, OP clear, EOMI, , no proptosis or lid retraction  Neck: no thyromegaly or palpable thyroid nodules   Respiratory: lungs CTAB.  Cardiovascular: regular rhythm, normal S1 and S2, no audible murmurs, no peripheral edema  GI: soft, NT/ND, no masses/HSM appreciated.  Neurology: no tremors, DTR 2+  Skin: no visible rashes/lesions. no acanthosis nigricans. no hyperlipotrophy. no cushing's stigmata.  Psychiatric: AAO x 3, normal affect/mood.  Ext: radial pulses intact, DP pulses intact, extremities warm, no cyanosis, clubbing or edema.       LABS:                        15.0   11.92 )-----------( 321      ( 2023 05:30 )             43.4         132<L>  |  95<L>  |  17  ----------------------------<  394<H>  4.8   |  26  |  1.31<H>    Ca    8.9      2023 05:30  Mg     1.8         TPro  7.0  /  Alb  4.2  /  TBili  0.6  /  DBili  x   /  AST  20  /  ALT  18  /  AlkPhos  98      PT/INR - ( 2023 23:32 )   PT: 11.7 sec;   INR: 0.98          PTT - ( 2023 23:32 )  PTT:31.2 sec  Urinalysis Basic - ( 2023 03:40 )    Color: Yellow / Appearance: Clear / S.010 / pH: x  Gluc: x / Ketone: NEGATIVE  / Bili: Negative / Urobili: 0.2 E.U./dL   Blood: x / Protein: NEGATIVE mg/dL / Nitrite: NEGATIVE   Leuk Esterase: NEGATIVE / RBC: < 5 /HPF / WBC < 5 /HPF   Sq Epi: x / Non Sq Epi: 0-5 /HPF / Bacteria: None /HPF        Thyroid Stimulating Hormone, Serum: 0.173 ( @ 05:30)      RADIOLOGY & ADDITIONAL STUDIES:  CAPILLARY BLOOD GLUCOSE      POCT Blood Glucose.: 434 mg/dL (2023 11:33)  POCT Blood Glucose.: 362 mg/dL (2023 08:14)  POCT Blood Glucose.: 397 mg/dL (2023 07:39)  POCT Blood Glucose.: 421 mg/dL (2023 06:43)  POCT Blood Glucose.: 375 mg/dL (2023 06:22)        A/P:62y Male    1.  DM -   A1C:  Weight:  Cr/GRF:  ER:    Please continue lantus       units at night / morning.  Please continue lispro      units before each meal.  Please continue lispro moderate / low dose sliding scale four times daily with meals and at bedtime    Pt's fingerstick glucose goal is     Will continue to monitor     For discharge, pt can continue    Pt can follow up at discharge with [facility] by calling  to make an appointment.   Will discuss case with     and update primary team    To Make an appointment at [address] [place] [address] for the patient, either:  1. Send a STAT task via [employer] to Roya Florentino or Ladonna Holliday (office managers)   OR  2. Call supervisor's line. P: [phone] (do not give this number to patient). VM is checked periodically.  In the message, specify that this is a hospital discharge follow-up, and that the appt can be made with a NP if there is no timely MD availability.    REMINDERS FOR INSULIN/DIABETES SUPPLIES at DISCHARGE:  INSULIN:   Long actin/Basal Insulin: Examples: Toujeo, Basaglar, Tresiba, Lantus   Short acting/Bolus Insulin: Humalog, Admelog, Novolog  Please ensure that BOTH short acting and long acting insulin are prescribed in the same preparation (Ex: PEN vs VIAL/SOLUTION)     TESTING SUPPLIES:   All glucometer supplies should be written as generic to avoid issues with insurance. Use the free text option in sunrise prescription writer, and type in glucometer test strips, lancets, etc to order.    If sending patient home on insulin PEN, please send:   •	BD marbella insulin pen needles for use up to 4 times daily (total quantity 100)  •	Lancets for use up to 4 times daily (total quantity 100)  •	Glucometer Test strips for use up to 4 times daily (total quantity 100)  •	Alcohol swabs for use up to 4 times daily (total quantity 100)  •	Glucometer (If provided by hospital, still provide scripts for lancets, test strips, and swabs)  If sending patient home on insulin VIAL, please send:   •	Insulin syringes (6mm) - for use up to 4 times daily (total quantity 100)  •	Lancets for use up to 4 times daily (total quantity 100)  •	Generic Glucometer Test strips for use up to 4 times daily (total quantity 100)  •	Alcohol swabs for use up to 4 times daily (total quantity 100)  •	Generic Glucometer (If provided by hospital, still provide scripts for lancets, test strips, and swabs)  •	Do not specify brand for testing supplies (such as contour, freestyle, one touch etc) that way the pharmacy has the freedom to pick and change according to what the insurance dictates.  For patients without insurance:   •	Provide social work with appropriate scripts so they may obtain 1 week of samples  •	Provide with glucometer. Glucometers are located at various nursing stations, the nursing office, education, and endocrine fellows office.  •	Please make appointment with Genie Ruiz NP or More Torres RN and DORINDA Worrell at the [address] [place] [address] endocrinology clinic. They can see patients without insurance, provide appropriate samples, and assist in getting insurance coverage.     PREFERRED PHARMACY:  [facility] (located on 1st floor next to admitting)  P: [phone]  Hours: M – F 8AM – 8PM, Sat 8AM – 4PM, Sun—closed  If not using [facility], please follow up with chosen pharmacy to ensure insulin prescribed is covered.        HPI:61 yo male, POOR HISTORIAN, w/ PMH of HTN, DM II, CHF (EF unknown), CAD s/p prior cardiac catheterizations with stents, and COPD (not O2 dependent), who presented to [facility] ED  c/o worsening SOB x 4 days. SOB worse with lying down, improved with sitting up. Associated with lower extremity swelling. Attempted to increase dose of lasix to treat edema, however ran out at home. Denies chest pain, palpitations, lightheadedness, dizziness. Denies fever, sweats, chills.    In ED: VS T 98.3, , /84, RR 18, spO2 94% on RA. EKG: NSR with nonspecific ST changes in v5-v6. Labs: WBC 16.19, Na 133, K 4.5, Mg 1.9, , CKMB 3.8, Trop 0.03, BNP 3929, VBG: pH 7.38/43/69/25, SARS-CoV-2 negative. CXR 23: Official read pending but appears to have evidence of interstitial edema and an AICD. Given IV Lasix 60 mg, Albueterol/Ipatropium nebulizer x 1, and Solumedrol 125mg x1.     TSH 0.173 and total t4 7.88. Recieved solumedrol 125mg night before labs drawn.  A1C 10.1%    FSG & insulin:    Dinner FSG   Lispro   +    Ate  Bedtime FSG     Lantus      and Lispro         Breakfast FSG   Lispro    +    Ate  Lunch FSG      Lispro    +    Ate      Age at Dx:  How dx:  Hx and duration of insulin:  Current Therapy:  Hx of other regimens:    Home FSG:  Fasting  Lunch  Dinner  Bed    Diet:  Exercise:    Hx of hypoglycemia:  Hx of DKA/HHS:  Complications:  Outpatient Endo:    FH:  DM:  Thyroid:  Autoimmune:  Other:    SH:  Smoking  Etoh:  Recreational Drugs:  Social Life:    PMH & Surgical Hx:  HF EXACERBATION  HTN (hypertension)  DM (diabetes mellitus)  CAD (coronary artery disease)  Acute systolic congestive heart failure  CHF exacerbation  Leukocytosis  HTN (hypertension)  Chronic obstructive pulmonary disease (COPD)  DM (diabetes mellitus)  CAD (coronary artery disease)  S/P cardiac cath  S/P ICD (internal cardiac defibrillator) procedure    Current Meds:  albuterol/ipratropium for Nebulization 3 milliLiter(s) Nebulizer every 6 hours PRN  dextrose 5%. 1000 milliLiter(s) IV Continuous <Continuous>  dextrose 5%. 1000 milliLiter(s) IV Continuous <Continuous>  dextrose 50% Injectable 25 Gram(s) IV Push once  dextrose 50% Injectable 12.5 Gram(s) IV Push once  dextrose 50% Injectable 25 Gram(s) IV Push once  dextrose Oral Gel 15 Gram(s) Oral once PRN  enoxaparin Injectable 40 milliGRAM(s) SubCutaneous every 24 hours  furosemide   Injectable 40 milliGRAM(s) IV Push two times a day  glucagon  Injectable 1 milliGRAM(s) IntraMuscular once  influenza   Vaccine 0.5 milliLiter(s) IntraMuscular once  insulin lispro (ADMELOG) corrective regimen sliding scale   SubCutaneous three times a day before meals  insulin lispro (ADMELOG) corrective regimen sliding scale   SubCutaneous at bedtime      Allergies:  No Known Allergies      ROS:  Denies the following except as indicated.    General: weight loss/weight gain, decreased appetite, fatigue  Eyes: Blurry vision, double vision, visual changes  ENT: Throat pain, changes in voice,   CV: palpitations, SOB, CP, cough  GI: NVD, difficulty swallowing, abdominal pain  : polyuria, dysuria  Endo: abnormal menses, temperature intolerance, decreased libido  MSK: weakness, joint pain  Skin: rash, dryness, diaphoresis  Heme: Easy bruising, bleeding  Neuro: HA, dizziness, lightheadedness, numbness/ tingling  Psych: Anxiety, Depression    Vital Signs Last 24 Hrs  T(C): 36.2 (2023 14:02), Max: 36.9 (2023 21:54)  T(F): 97.2 (2023 14:02), Max: 98.4 (2023 21:54)  HR: 112 (2023 10:49) (98 - 130)  BP: 140/90 (2023 10:49) (129/77 - 166/95)  BP(mean): --  RR: 18 (2023 06:41) (18 - 20)  SpO2: 93% (2023 10:49) (93% - 98%)    Parameters below as of 2023 10:49  Patient On (Oxygen Delivery Method): room air      Height (cm): 170.2 ( @ 06:41)  Weight (kg): 96.5 ( @ 06:41)  BMI (kg/m2): 33.3 ( @ 06:41)      Constitutional: wn/wd in NAD.   HEENT: NCAT, MMM, OP clear, EOMI, , no proptosis or lid retraction  Neck: no thyromegaly or palpable thyroid nodules   Respiratory: lungs CTAB.  Cardiovascular: regular rhythm, normal S1 and S2, no audible murmurs, no peripheral edema  GI: soft, NT/ND, no masses/HSM appreciated.  Neurology: no tremors, DTR 2+  Skin: no visible rashes/lesions. no acanthosis nigricans. no hyperlipotrophy. no cushing's stigmata.  Psychiatric: AAO x 3, normal affect/mood.  Ext: radial pulses intact, DP pulses intact, extremities warm, no cyanosis, clubbing or edema.       LABS:                        15.0   11.92 )-----------( 321      ( 2023 05:30 )             43.4         132<L>  |  95<L>  |  17  ----------------------------<  394<H>  4.8   |  26  |  1.31<H>    Ca    8.9      2023 05:30  Mg     1.8         TPro  7.0  /  Alb  4.2  /  TBili  0.6  /  DBili  x   /  AST  20  /  ALT  18  /  AlkPhos  98      PT/INR - ( 2023 23:32 )   PT: 11.7 sec;   INR: 0.98          PTT - ( 2023 23:32 )  PTT:31.2 sec  Urinalysis Basic - ( 2023 03:40 )    Color: Yellow / Appearance: Clear / S.010 / pH: x  Gluc: x / Ketone: NEGATIVE  / Bili: Negative / Urobili: 0.2 E.U./dL   Blood: x / Protein: NEGATIVE mg/dL / Nitrite: NEGATIVE   Leuk Esterase: NEGATIVE / RBC: < 5 /HPF / WBC < 5 /HPF   Sq Epi: x / Non Sq Epi: 0-5 /HPF / Bacteria: None /HPF        Thyroid Stimulating Hormone, Serum: 0.173 ( @ 05:30)      RADIOLOGY & ADDITIONAL STUDIES:  CAPILLARY BLOOD GLUCOSE      POCT Blood Glucose.: 434 mg/dL (2023 11:33)  POCT Blood Glucose.: 362 mg/dL (2023 08:14)  POCT Blood Glucose.: 397 mg/dL (2023 07:39)  POCT Blood Glucose.: 421 mg/dL (2023 06:43)  POCT Blood Glucose.: 375 mg/dL (2023 06:22)        A/P:62y Male    1.  DM -       To Make an appointment at [address] [place] [address] for the patient, either:  1. Send a STAT task via [employer] to Roya Florentino or Ladonna Holliday (office managers)   OR  2. Call supervisor's line. P: [phone] (do not give this number to patient). VM is checked periodically.  In the message, specify that this is a hospital discharge follow-up, and that the appt can be made with a NP if there is no timely MD availability.    REMINDERS FOR INSULIN/DIABETES SUPPLIES at DISCHARGE:  INSULIN:   Long actin/Basal Insulin: Examples: Toujeo, Basaglar, Tresiba, Lantus   Short acting/Bolus Insulin: Humalog, Admelog, Novolog  Please ensure that BOTH short acting and long acting insulin are prescribed in the same preparation (Ex: PEN vs VIAL/SOLUTION)     TESTING SUPPLIES:   All glucometer supplies should be written as generic to avoid issues with insurance. Use the free text option in [facility] prescription writer, and type in glucometer test strips, lancets, etc to order.    If sending patient home on insulin PEN, please send:   •	BD marbella insulin pen needles for use up to 4 times daily (total quantity 100)  •	Lancets for use up to 4 times daily (total quantity 100)  •	Glucometer Test strips for use up to 4 times daily (total quantity 100)  •	Alcohol swabs for use up to 4 times daily (total quantity 100)  •	Glucometer (If provided by hospital, still provide scripts for lancets, test strips, and swabs)  If sending patient home on insulin VIAL, please send:   •	Insulin syringes (6mm) - for use up to 4 times daily (total quantity 100)  •	Lancets for use up to 4 times daily (total quantity 100)  •	Generic Glucometer Test strips for use up to 4 times daily (total quantity 100)  •	Alcohol swabs for use up to 4 times daily (total quantity 100)  •	Generic Glucometer (If provided by hospital, still provide scripts for lancets, test strips, and swabs)  •	Do not specify brand for testing supplies (such as contour, freestyle, one touch etc) that way the pharmacy has the freedom to pick and change according to what the insurance dictates.  For patients without insurance:   •	Provide social work with appropriate scripts so they may obtain 1 week of samples  •	Provide with glucometer. Glucometers are located at various nursing stations, the nursing office, education, and endocrine fellows office.  •	Please make appointment with Genie Ruiz NP or More Torres RN and DORINDA Worrell at the [address] [place] [address] endocrinology clinic. They can see patients without insurance, provide appropriate samples, and assist in getting insurance coverage.     PREFERRED PHARMACY:  [facility] (located on 1st floor next to admitting)  P: [phone]  Hours: M – F 8AM – 8PM, Sat 8AM – 4PM, Sun—closed  If not using [facility], please follow up with chosen pharmacy to ensure insulin prescribed is covered.        HPI:61 yo male, w/ PMH of HTN, T2DM, CHF, CAD s/p prior cardiac catheterizations with stents, and COPD (not O2 dependent), who presented to [facility] ED  c/o worsening SOB x 4 days. SOB worse with lying down, improved with sitting up. Associated with lower extremity swelling. Attempted to increase dose of lasix to treat edema, however ran out at home. Denies chest pain, palpitations, lightheadedness, dizziness. Denies fever, sweats, chills.    In ED: VS T 98.3, , /84, RR 18, spO2 94% on RA. EKG: NSR with nonspecific ST changes in v5-v6. Labs: WBC 16.19, Na 133, K 4.5, Mg 1.9, , CKMB 3.8, Trop 0.03, BNP 3929, VBG: pH 7.38/43/69/25, SARS-CoV-2 negative. CXR 23: Official read pending but appears to have evidence of interstitial edema and an AICD. Given IV Lasix 60 mg, Albueterol/Ipatropium nebulizer x 1, and Solumedrol 125mg x1.     Endocrine consulted for low TSH and uncontrolled T2DM with A1C 10.1%. Pt seen and examined at the bedside. + bibasilar crackles and 1+ b/l LES edema. Reports breathing is improved. Sitting comfortably. Good appetite.    Thyroid:  He denies history of abnormal thyroid function tests. He did have a thyroid US approx 2-3 years ago, which showed enlarged gland unclear if it was nodular. He was instructed to f/u, but did not. He has a garbled, wet sounding voice, but reports it has always been like that. Has recently had trouble swallowing pills on the left side. No other compressive symptoms or constricted breathing. His mom had thyroid radiation, and sister and cousin had thyroidectomies.  TSH 0.173 and total t4 7.88. Received solumedrol 125mg night before labs drawn.      Type 2 DM:  Age at Dx: 49 y/o  How dx:  Hx and duration of insulin: started 5-6 years ago.  Current Therapy: Tresiba 28 units at night and levemir 20 units in the AM. HE has not been taking consistently recently He reports when he is taking as ordered his BG is better, though he can't recall a A1C.  Hx of other regimens: Initially started on metformin, but transitioned to insulin due to ineffectiveness.    Home FSG: Knows how to check FSG, but hasn't in at least a month.    Diet: Breakfast - coffee with creamer. Lunch - sandwich or skips. Snacks on coffee and cake. Dinner - meat, veggie, rice and bread. No sweetened beverages. Likes cookies and cakes.  Complications: Overdue for eye exam. Denies neuropathic symptoms.  Outpatient Endo: managed by PCP not seen in approx 1 year.    SH:  Works as .    FSG & insulin:  :  Bedtime   :  6AM . Lispro 5  7AM . Lispro 10.  10AM . Lsipro 3. Ate egg, oatmeal, BB yogurt.  Lunch . Lsipro 6. Ate salmon, rice, veggies, bean SF cookie.  Dinner   Lispro 8 + 5    PMH & Surgical Hx:  HF EXACERBATION  HTN (hypertension)  DM (diabetes mellitus)  CAD (coronary artery disease)  Acute systolic congestive heart failure  CHF exacerbation  Leukocytosis  HTN (hypertension)  Chronic obstructive pulmonary disease (COPD)  DM (diabetes mellitus)  CAD (coronary artery disease)  S/P cardiac cath  S/P ICD (internal cardiac defibrillator) procedure    Current Meds:  albuterol/ipratropium for Nebulization 3 milliLiter(s) Nebulizer every 6 hours PRN  dextrose 5%. 1000 milliLiter(s) IV Continuous <Continuous>  dextrose 5%. 1000 milliLiter(s) IV Continuous <Continuous>  dextrose 50% Injectable 25 Gram(s) IV Push once  dextrose 50% Injectable 12.5 Gram(s) IV Push once  dextrose 50% Injectable 25 Gram(s) IV Push once  dextrose Oral Gel 15 Gram(s) Oral once PRN  enoxaparin Injectable 40 milliGRAM(s) SubCutaneous every 24 hours  furosemide   Injectable 40 milliGRAM(s) IV Push two times a day  glucagon  Injectable 1 milliGRAM(s) IntraMuscular once  influenza   Vaccine 0.5 milliLiter(s) IntraMuscular once  insulin lispro (ADMELOG) corrective regimen sliding scale   SubCutaneous three times a day before meals  insulin lispro (ADMELOG) corrective regimen sliding scale   SubCutaneous at bedtime      Allergies:  No Known Allergies      ROS: See HPI, otherwise negative.    Vital Signs Last 24 Hrs  T(C): 36.2 (2023 14:02), Max: 36.9 (2023 21:54)  T(F): 97.2 (2023 14:02), Max: 98.4 (2023 21:54)  HR: 112 (2023 10:49) (98 - 130)  BP: 140/90 (2023 10:49) (129/77 - 166/95)  BP(mean): --  RR: 18 (2023 06:41) (18 - 20)  SpO2: 93% (2023 10:49) (93% - 98%)    Parameters below as of 2023 10:49  Patient On (Oxygen Delivery Method): room air      Height (cm): 170.2 ( @ 06:41)  Weight (kg): 96.5 ( @ 06:41)  BMI (kg/m2): 33.3 ( @ 06:41)      Constitutional:  NAD. Obese.  HEENT: NCAT, MMM, OP clear, EOMI, , no proptosis or lid retraction  Neck: + thyromegaly  Respiratory: bibasilar crackles  Cardiovascular: regular rhythm, normal S1 and S2, no audible murmurs, 1+ b/l les edema  GI: soft, NT/ND, no masses/HSM appreciated.  Neurology: no tremors, DTR 2+  Skin: no visible rashes/lesions.   Psychiatric: AAO x 3, normal affect/mood.  Ext: radial pulses intact, DP pulses intact, extremities warm, no cyanosis, clubbing      LABS:                        15.0   11.92 )-----------( 321      ( 2023 05:30 )             43.4         132<L>  |  95<L>  |  17  ----------------------------<  394<H>  4.8   |  26  |  1.31<H>    Ca    8.9      2023 05:30  Mg     1.8         TPro  7.0  /  Alb  4.2  /  TBili  0.6  /  DBili  x   /  AST  20  /  ALT  18  /  AlkPhos  98      PT/INR - ( 2023 23:32 )   PT: 11.7 sec;   INR: 0.98          PTT - ( 2023 23:32 )  PTT:31.2 sec  Urinalysis Basic - ( 2023 03:40 )    Color: Yellow / Appearance: Clear / S.010 / pH: x  Gluc: x / Ketone: NEGATIVE  / Bili: Negative / Urobili: 0.2 E.U./dL   Blood: x / Protein: NEGATIVE mg/dL / Nitrite: NEGATIVE   Leuk Esterase: NEGATIVE / RBC: < 5 /HPF / WBC < 5 /HPF   Sq Epi: x / Non Sq Epi: 0-5 /HPF / Bacteria: None /HPF        Thyroid Stimulating Hormone, Serum: 0.173 ( @ 05:30)     HPI:63 yo male, w/ PMH of HTN, T2DM, CHF, CAD s/p prior cardiac catheterizations with stents, and COPD (not O2 dependent), who presented to [facility] ED  c/o worsening SOB x 4 days. SOB worse with lying down, improved with sitting up. Associated with lower extremity swelling. Attempted to increase dose of lasix to treat edema, however ran out at home. Denies chest pain, palpitations, lightheadedness, dizziness. Denies fever, sweats, chills.    In ED: VS T 98.3, , /84, RR 18, spO2 94% on RA. EKG: NSR with nonspecific ST changes in v5-v6. Labs: WBC 16.19, Na 133, K 4.5, Mg 1.9, , CKMB 3.8, Trop 0.03, BNP 3929, VBG: pH 7.38/43/69/25, SARS-CoV-2 negative. CXR 23: Official read pending but appears to have evidence of interstitial edema and an AICD. Given IV Lasix 60 mg, Albueterol/Ipatropium nebulizer x 1, and Solumedrol 125mg x1.     Endocrine consulted for low TSH and uncontrolled T2DM with A1C 10.1%. Pt seen and examined at the bedside. + bibasilar crackles and 1+ b/l LES edema. Reports breathing is improved. Sitting comfortably. Good appetite.    Thyroid:  He denies history of abnormal thyroid function tests. He did have a thyroid US approx 2-3 years ago, which showed enlarged gland unclear if it was nodular and also had FNA that was most likely benign. He was instructed to f/u, but did not. He has a garbled, wet sounding voice, but reports it has always been like that. Has recently had trouble swallowing pills on the left side. No other compressive symptoms or constricted breathing. His mom had thyroid radiation, and sister and cousin had thyroidectomies.  TSH 0.173 and total t4 7.88. Received solumedrol 125mg night before labs drawn.      Type 2 DM:  Age at Dx: 49 y/o  How dx:  Hx and duration of insulin: started 5-6 years ago.  Current Therapy: Tresiba 28 units at night and Victoza 1.8mg in the AM. HE has not been taking consistently recently He reports when he is taking as ordered his BG is better, though he can't recall a A1C.  Hx of other regimens: Initially started on metformin, but transitioned to insulin due to ineffectiveness.    Home FSG: Knows how to check FSG, but hasn't in at least a month.    Diet: Breakfast - coffee with creamer. Lunch - sandwich or skips. Snacks on coffee and cake. Dinner - meat, veggie, rice and bread. No sweetened beverages. Likes cookies and cakes.  Complications: Overdue for eye exam. Denies neuropathic symptoms.  Outpatient Endo: managed by PCP not seen in approx 1 year.    SH:  Works as .    FSG & insulin:  :  Bedtime   :  6AM . Lispro 5  7AM . Lispro 10.  10AM . Lsipro 3. Ate egg, oatmeal, BB yogurt.  Lunch . Lsipro 6. Ate salmon, rice, veggies, bean SF cookie.  Dinner   Lispro 8 + 5    PMH & Surgical Hx:  HF EXACERBATION  HTN (hypertension)  DM (diabetes mellitus)  CAD (coronary artery disease)  Acute systolic congestive heart failure  CHF exacerbation  Leukocytosis  HTN (hypertension)  Chronic obstructive pulmonary disease (COPD)  DM (diabetes mellitus)  CAD (coronary artery disease)  S/P cardiac cath  S/P ICD (internal cardiac defibrillator) procedure    Current Meds:  albuterol/ipratropium for Nebulization 3 milliLiter(s) Nebulizer every 6 hours PRN  dextrose 5%. 1000 milliLiter(s) IV Continuous <Continuous>  dextrose 5%. 1000 milliLiter(s) IV Continuous <Continuous>  dextrose 50% Injectable 25 Gram(s) IV Push once  dextrose 50% Injectable 12.5 Gram(s) IV Push once  dextrose 50% Injectable 25 Gram(s) IV Push once  dextrose Oral Gel 15 Gram(s) Oral once PRN  enoxaparin Injectable 40 milliGRAM(s) SubCutaneous every 24 hours  furosemide   Injectable 40 milliGRAM(s) IV Push two times a day  glucagon  Injectable 1 milliGRAM(s) IntraMuscular once  influenza   Vaccine 0.5 milliLiter(s) IntraMuscular once  insulin lispro (ADMELOG) corrective regimen sliding scale   SubCutaneous three times a day before meals  insulin lispro (ADMELOG) corrective regimen sliding scale   SubCutaneous at bedtime      Allergies:  No Known Allergies      ROS: See HPI, otherwise negative.    Vital Signs Last 24 Hrs  T(C): 36.2 (2023 14:02), Max: 36.9 (2023 21:54)  T(F): 97.2 (2023 14:02), Max: 98.4 (2023 21:54)  HR: 112 (2023 10:49) (98 - 130)  BP: 140/90 (2023 10:49) (129/77 - 166/95)  BP(mean): --  RR: 18 (2023 06:41) (18 - 20)  SpO2: 93% (2023 10:49) (93% - 98%)    Parameters below as of 2023 10:49  Patient On (Oxygen Delivery Method): room air      Height (cm): 170.2 ( @ 06:41)  Weight (kg): 96.5 ( @ 06:41)  BMI (kg/m2): 33.3 ( @ 06:41)      Constitutional:  NAD. Obese.  HEENT: NCAT, MMM, OP clear, EOMI, , no proptosis or lid retraction  Neck: + thyromegaly  Respiratory: bibasilar crackles  Cardiovascular: regular rhythm, normal S1 and S2, no audible murmurs, 1+ b/l les edema  GI: soft, NT/ND, no masses/HSM appreciated.  Neurology: no tremors, DTR 2+  Skin: no visible rashes/lesions.   Psychiatric: AAO x 3, normal affect/mood.  Ext: radial pulses intact, DP pulses intact, extremities warm, no cyanosis, clubbing      LABS:                        15.0   11.92 )-----------( 321      ( 2023 05:30 )             43.4         132<L>  |  95<L>  |  17  ----------------------------<  394<H>  4.8   |  26  |  1.31<H>    Ca    8.9      2023 05:30  Mg     1.8         TPro  7.0  /  Alb  4.2  /  TBili  0.6  /  DBili  x   /  AST  20  /  ALT  18  /  AlkPhos  98      PT/INR - ( 2023 23:32 )   PT: 11.7 sec;   INR: 0.98          PTT - ( 2023 23:32 )  PTT:31.2 sec  Urinalysis Basic - ( 2023 03:40 )    Color: Yellow / Appearance: Clear / S.010 / pH: x  Gluc: x / Ketone: NEGATIVE  / Bili: Negative / Urobili: 0.2 E.U./dL   Blood: x / Protein: NEGATIVE mg/dL / Nitrite: NEGATIVE   Leuk Esterase: NEGATIVE / RBC: < 5 /HPF / WBC < 5 /HPF   Sq Epi: x / Non Sq Epi: 0-5 /HPF / Bacteria: None /HPF        Thyroid Stimulating Hormone, Serum: 0.173 ( @ 05:30)

## 2023-01-31 NOTE — H&P ADULT - CARDIOVASCULAR
normal/regular rate and rhythm/S1 S2 present/no gallops/no rub/no murmur/peripheral edema/pedal edema

## 2023-01-31 NOTE — ED PROVIDER NOTE - OBJECTIVE STATEMENT
history of htn, dm, copd (no home O2), chf, cad s/p stents  cough / cold symptoms x 2 weeks but breathing ok.   now few days of increased SOB  noticed swelling mostly around his ankles in last 48 hours  off lasix for "a while" as he was doubling dose. 62 yr old male, history of htn, dm, copd (no home O2), chf, cad s/p stents, presents to the Emergency Department with shortness of breath. pt reports two weeks of congestion, cough, sore throat most of which has resolved, no sob during that time. now here w 4 days of increased shortness of breath. worse when laying flat at night. in last two days noticed swelling of bilateral ankles.   off lasix for "a while" as he was doubling dose to try and help with the leg swelling. does not know exactly how long he has been off it.  no fever, chest pain, abd pain, n/v/d, urinary symptoms.

## 2023-01-31 NOTE — H&P ADULT - PROBLEM SELECTOR PLAN 5
-- Follow up A1C in AM    DVT Proph:   F:  E:  N: Normotensive, SBP in   -- Continue Normotensive, SBP in   -- Obtain med rec in morning to confirm home meds Normotensive, SBP in 140s  -- Obtain med rec in morning as above to confirm home meds and resume

## 2023-01-31 NOTE — CHART NOTE - NSCHARTNOTEFT_GEN_A_CORE
61 y/o Male, POOR HISTORIAN, w/ PMHx of HTN, Type 2 Diabetes, CHF (unknown EF), CAD s/p stents, COPD (not O2 dependent) presented to [facility] c/o worsening SOB x4 days, found to have elevated BNP and interstitial edema on CXR; admitted to cardiac telemetry for acute on chronic heart failure exacerbation requiring IV diuresis    # Acute on chronic systolic congestive heart failure.   Presented w/ SOB and LE edema likely d/t running out of medication at home (dose increased). Hx of CHF, unknown EF  - Bibasilar crackles and 1+ pitting edema b/l LE on 3L NC  - CXR 1/30/23: bibasilar opacities/pleural effusions, cardiomegaly, thoracic aortic calcification, satisfactory position left ICD, thoracic spine degenerative changes, L coracoclavicular joint variant  - EKG non-ischemic. Troponin T 0.03 x2, BNP 3929. , CKMB 3.8  - s/p Lasix 60mg IV x1 in ED, continue Lasix 40mg IV BID. Net neg 200cc  - TTE pending  - Core measures, daily weights, strict I&Os, PT eval    # Leukocytosis  Admits to productive cough. WBC 16.19 on admission, since downtrended to 11.92 today.  - CXR 1/30/23: bibasilar opacities/pleural effusions, cardiomegaly, thoracic aortic calcification, satisfactory position left ICD, thoracic spine degenerative changes, L coracoclavicular joint variant  - BCx x2 testing. UA normal. Lactate 1.3  - Afebrile, monitor off abx  - Medicine consult re: initiating CAP antibiotic coverage    # CAD   s/p prior cardiac catheterization with stents at [facility]  - Continue ASA 81mg QD  - Will call pharmacy for med rec    # HTN  Stable, -140s  - Med rec as above    # Type 2 Diabetes  - A1c 10.1%  - Endocrinology consulted  - Continue ISS w/ FS qAC and QHS    # COPD  SOB could also be attributed to COPD exacerbation  - s/p Solumderol 125mg x1 and Duoneb x1 in ED  - Continue Duoneb PRN for SOB/wheezing    # PPx  F: none  E: Keep K > 4 and Mg > 2  N: DASH/TLC w/ 1L fluid restriction  VTE ppx: Lovenox  Code status: Full    Dispo: pending euvolemia, TTE, PT eval 61 y/o Male, POOR HISTORIAN, w/ PMHx of HTN, Type 2 Diabetes, CHF (unknown EF), CAD s/p stents, COPD (not O2 dependent) presented to [facility] c/o worsening SOB x4 days, found to have elevated BNP and interstitial edema on CXR; admitted to cardiac telemetry for acute on chronic heart failure exacerbation requiring IV diuresis    # Acute on chronic systolic congestive heart failure.   Presented w/ SOB and LE edema likely d/t running out of medication at home (dose increased). Hx of CHF, unknown EF  - Bibasilar crackles and 1+ pitting edema b/l LE on 3L NC  - CXR 1/30/23: bibasilar opacities/pleural effusions, cardiomegaly, thoracic aortic calcification, satisfactory position left ICD, thoracic spine degenerative changes, L coracoclavicular joint variant  - EKG non-ischemic. Troponin T 0.03 x2, BNP 3929. , CKMB 3.8  - s/p Lasix 60mg IV x1 in ED, continue Lasix 40mg IV BID. Net neg 200cc  - TTE 1/31/23: LVEF 33%, mod-severely reduced LVSF, severely dilated LV  - Core measures, daily weights, strict I&Os, PT eval    # Leukocytosis  Admits to productive cough. WBC 16.19 on admission, since downtrended to 11.92 today.  - CXR 1/30/23: bibasilar opacities/pleural effusions, cardiomegaly, thoracic aortic calcification, satisfactory position left ICD, thoracic spine degenerative changes, L coracoclavicular joint variant  - BCx x2 testing. UA normal. Lactate 1.3  - Afebrile, monitor off abx    # CAD   s/p prior cardiac catheterization with stents at [facility]  - Continue ASA 81mg QD  - Will call pharmacy for med rec    # HTN  Stable, -140s  - Med rec as above    # Type 2 Diabetes  - A1c 10.1%  - Endocrinology consulted  - Continue ISS w/ FS qAC and QHS    # COPD  SOB could also be attributed to COPD exacerbation  - s/p Solumderol 125mg x1 and Duoneb x1 in ED  - Continue Duoneb PRN for SOB/wheezing    # PPx  F: none  E: Keep K > 4 and Mg > 2  N: DASH/TLC w/ 1L fluid restriction  VTE ppx: Lovenox  Code status: Full    Dispo: pending euvolemia, ?ischemic eval, PT eval 61 y/o Male, POOR HISTORIAN, w/ PMHx of HTN, Type 2 Diabetes, CHF (unknown EF), CAD s/p stents, COPD (not O2 dependent) presented to [facility] c/o worsening SOB x4 days, found to have elevated BNP and interstitial edema on CXR; admitted to cardiac telemetry for acute on chronic heart failure exacerbation requiring IV diuresis    # Acute on chronic systolic congestive heart failure.   Presented w/ SOB and LE edema likely d/t running out of medication at home (dose increased). Hx of CHF, unknown EF  - Bibasilar crackles and 1+ pitting edema b/l LE on 3L NC  - CXR 1/30/23: bibasilar opacities/pleural effusions, cardiomegaly, thoracic aortic calcification, satisfactory position left ICD, thoracic spine degenerative changes, L coracoclavicular joint variant  - EKG non-ischemic. Troponin T 0.03 x2, BNP 3929. , CKMB 3.8  - s/p Lasix 60mg IV x1 in ED, continue Lasix 40mg IV BID. Net neg 200cc  - TTE 1/31/23: LVEF 33%, mod-severely reduced LVSF, severely dilated LV  - Will likely need ischemic evaluation once euvolemic  - Core measures, daily weights, strict I&Os, PT eval    # Leukocytosis  Admits to productive cough. WBC 16.19 on admission, since downtrended to 11.92 today.  - CXR 1/30/23: bibasilar opacities/pleural effusions, cardiomegaly, thoracic aortic calcification, satisfactory position left ICD, thoracic spine degenerative changes, L coracoclavicular joint variant  - BCx x2 testing. UA normal. Lactate 1.3  - Afebrile, monitor off abx    # CAD   s/p prior cardiac catheterization with stents at [facility]  - Continue ASA 81mg QD  - Will call pharmacy for med rec    # HTN  Stable, -140s  - Med rec as above    # Type 2 Diabetes  - A1c 10.1%  - Endocrinology consulted, appreciate recommendations  - Continue ISS w/ FS qAC and QHS    # Abnormal TSH  - TSH 0.173 and total T4 7.88  - Endocrinology consulted as above, appreciate recommendations  - Likely d/t receiving Solumedrol 125mg the night prior  - Will need to re-check TSH and T3/T4 prior to dc    # COPD  SOB could also be attributed to COPD exacerbation  - s/p Solumderol 125mg x1 and Duoneb x1 in ED  - Continue Duoneb PRN for SOB/wheezing    # PPx  F: none  E: Keep K > 4 and Mg > 2  N: DASH/TLC w/ 1L fluid restriction  VTE ppx: Lovenox  Code status: Full    Dispo: pending euvolemia, ?ischemic eval, PT eval 63 y/o Male, POOR HISTORIAN, w/ PMHx of HTN, Type 2 Diabetes, CHF (unknown EF), CAD s/p stents, COPD (not O2 dependent) presented to [facility] c/o worsening SOB x4 days, found to have elevated BNP and interstitial edema on CXR; admitted to cardiac telemetry for acute on chronic heart failure exacerbation requiring IV diuresis    # Acute on chronic systolic congestive heart failure.   Presented w/ SOB and LE edema likely d/t running out of medication at home (dose increased). Hx of CHF, unknown EF  - Bibasilar crackles and 1+ pitting edema b/l LE on 3L NC  - CXR 1/30/23: bibasilar opacities/pleural effusions, cardiomegaly, thoracic aortic calcification, satisfactory position left ICD, thoracic spine degenerative changes, L coracoclavicular joint variant  - EKG non-ischemic. Troponin T 0.03 x2, BNP 3929. , CKMB 3.8  - s/p Lasix 60mg IV x1 in ED, continue Lasix 40mg IV BID. Net neg 200cc  - TTE 1/31/23: LVEF 33%, mod-severely reduced LVSF, severely dilated LV  - Will likely need ischemic evaluation once euvolemic  - Core measures, daily weights, strict I&Os, PT eval    # Leukocytosis  Admits to productive cough. WBC 16.19 on admission, since downtrended to 11.92 today.  - CXR 1/30/23: bibasilar opacities/pleural effusions, cardiomegaly, thoracic aortic calcification, satisfactory position left ICD, thoracic spine degenerative changes, L coracoclavicular joint variant  - BCx x2 testing. UA normal. Lactate 1.3  - Afebrile, monitor off abx    # CAD   s/p prior cardiac catheterization with stents at [facility]  - Continue ASA 81mg QD  - Attempted to contact pharmacy for med rec, will need to retry    # HTN  Stable, -140s  - Med rec as above    # Type 2 Diabetes  - A1c 10.1%  - Endocrinology consulted, appreciate recommendations  - Continue ISS w/ FS qAC and QHS    # Abnormal TSH  - TSH 0.173 and total T4 7.88  - Endocrinology consulted as above, appreciate recommendations  - Likely d/t receiving Solumedrol 125mg the night prior  - Will need to re-check TSH and T3/T4 prior to dc  - Thyroid US    # COPD  SOB could also be attributed to COPD exacerbation  - s/p Solumedrol 125mg x1 and Duoneb x1 in ED  - Continue Duoneb PRN for SOB/wheezing    # PPx  F: none  E: Keep K > 4 and Mg > 2  N: DASH/TLC w/ 1L fluid restriction  VTE ppx: Lovenox  Code status: Full    Dispo: pending euvolemia, ?ischemic eval, PT eval 63 y/o Male, POOR HISTORIAN, w/ PMHx of HTN, Type 2 Diabetes, CHF (unknown EF), CAD s/p stents, COPD (not O2 dependent) presented to [facility] c/o worsening SOB x4 days, found to have elevated BNP and interstitial edema on CXR; admitted to cardiac telemetry for acute on chronic heart failure exacerbation requiring IV diuresis    # Acute on chronic systolic congestive heart failure.   Presented w/ SOB and LE edema likely d/t running out of medication at home (dose increased). Hx of CHF, unknown EF  - Bibasilar crackles and 1+ pitting edema b/l LE on 3L NC  - CXR 1/30/23: bibasilar opacities/pleural effusions, cardiomegaly, thoracic aortic calcification, satisfactory position left ICD, thoracic spine degenerative changes, L coracoclavicular joint variant  - EKG non-ischemic. Troponin T 0.03 x2, BNP 3929. , CKMB 3.8. Elevated trops likely 2/2 demand ischemia from CHF exacerbation  - s/p Lasix 60mg IV x1 in ED, continue Lasix 40mg IV BID. Documented net neg 200cc (per pt UOP ~4L)  - TTE 1/31/23: LVEF 33%, mod-severely reduced LVSF, severely dilated LV  - Will likely need ischemic evaluation once euvolemic  - Core measures, daily weights, strict I&Os, PT eval    # Leukocytosis  Admits to productive cough. WBC 16.19 on admission, since downtrended to 11.92 today.  - CXR 1/30/23: bibasilar opacities/pleural effusions, cardiomegaly, thoracic aortic calcification, satisfactory position left ICD, thoracic spine degenerative changes, L coracoclavicular joint variant  - BCx x2 testing. UA normal. Lactate 1.3  - Afebrile, monitor off abx    # CAD   s/p prior cardiac catheterization with stents at [facility]  - Continue ASA 81mg QD  - Attempted to contact pharmacy for med rec, will need to retry    # HTN  Stable, -140s  - Med rec as above    # Type 2 Diabetes  - A1c 10.1%  - Endocrinology consulted, appreciate recommendations  - Continue ISS w/ FS qAC and QHS    # Abnormal TSH  - TSH 0.173 and total T4 7.88  - Endocrinology consulted as above, appreciate recommendations  - Likely d/t receiving Solumedrol 125mg the night prior  - Will need to re-check TSH and T3/T4 prior to dc  - Thyroid US    # COPD  SOB could also be attributed to COPD exacerbation  - s/p Solumedrol 125mg x1 and Duoneb x1 in ED  - Continue Duoneb PRN for SOB/wheezing    # PPx  F: none  E: Keep K > 4 and Mg > 2  N: DASH/TLC w/ 1L fluid restriction  VTE ppx: Lovenox  Code status: Full    Dispo: pending euvolemia, ?ischemic eval, PT eval

## 2023-02-01 DIAGNOSIS — R79.89 OTHER SPECIFIED ABNORMAL FINDINGS OF BLOOD CHEMISTRY: ICD-10-CM

## 2023-02-01 DIAGNOSIS — E78.5 HYPERLIPIDEMIA, UNSPECIFIED: ICD-10-CM

## 2023-02-01 DIAGNOSIS — E01.0 IODINE-DEFICIENCY RELATED DIFFUSE (ENDEMIC) GOITER: ICD-10-CM

## 2023-02-01 LAB
ANION GAP SERPL CALC-SCNC: 15 MMOL/L — SIGNIFICANT CHANGE UP (ref 5–17)
BUN SERPL-MCNC: 24 MG/DL — HIGH (ref 7–23)
CALCIUM SERPL-MCNC: 9.1 MG/DL — SIGNIFICANT CHANGE UP (ref 8.4–10.5)
CHLORIDE SERPL-SCNC: 91 MMOL/L — LOW (ref 96–108)
CO2 SERPL-SCNC: 25 MMOL/L — SIGNIFICANT CHANGE UP (ref 22–31)
CREAT SERPL-MCNC: 1.51 MG/DL — HIGH (ref 0.5–1.3)
EGFR: 52 ML/MIN/1.73M2 — LOW
GLUCOSE BLDC GLUCOMTR-MCNC: 159 MG/DL — HIGH (ref 70–99)
GLUCOSE BLDC GLUCOMTR-MCNC: 161 MG/DL — HIGH (ref 70–99)
GLUCOSE BLDC GLUCOMTR-MCNC: 182 MG/DL — HIGH (ref 70–99)
GLUCOSE BLDC GLUCOMTR-MCNC: 272 MG/DL — HIGH (ref 70–99)
GLUCOSE BLDC GLUCOMTR-MCNC: 330 MG/DL — HIGH (ref 70–99)
GLUCOSE SERPL-MCNC: 328 MG/DL — HIGH (ref 70–99)
HCT VFR BLD CALC: 42.5 % — SIGNIFICANT CHANGE UP (ref 39–50)
HGB BLD-MCNC: 14.4 G/DL — SIGNIFICANT CHANGE UP (ref 13–17)
MAGNESIUM SERPL-MCNC: 1.9 MG/DL — SIGNIFICANT CHANGE UP (ref 1.6–2.6)
MCHC RBC-ENTMCNC: 30.4 PG — SIGNIFICANT CHANGE UP (ref 27–34)
MCHC RBC-ENTMCNC: 33.9 GM/DL — SIGNIFICANT CHANGE UP (ref 32–36)
MCV RBC AUTO: 89.7 FL — SIGNIFICANT CHANGE UP (ref 80–100)
NRBC # BLD: 0 /100 WBCS — SIGNIFICANT CHANGE UP (ref 0–0)
PLATELET # BLD AUTO: 344 K/UL — SIGNIFICANT CHANGE UP (ref 150–400)
POTASSIUM SERPL-MCNC: 4.2 MMOL/L — SIGNIFICANT CHANGE UP (ref 3.5–5.3)
POTASSIUM SERPL-SCNC: 4.2 MMOL/L — SIGNIFICANT CHANGE UP (ref 3.5–5.3)
RBC # BLD: 4.74 M/UL — SIGNIFICANT CHANGE UP (ref 4.2–5.8)
RBC # FLD: 13.2 % — SIGNIFICANT CHANGE UP (ref 10.3–14.5)
SODIUM SERPL-SCNC: 131 MMOL/L — LOW (ref 135–145)
T3 SERPL-MCNC: 114 NG/DL — SIGNIFICANT CHANGE UP (ref 80–200)
T4 FREE SERPL-MCNC: 1.15 NG/DL — SIGNIFICANT CHANGE UP (ref 0.93–1.7)
WBC # BLD: 15.6 K/UL — HIGH (ref 3.8–10.5)
WBC # FLD AUTO: 15.6 K/UL — HIGH (ref 3.8–10.5)

## 2023-02-01 PROCEDURE — 99233 SBSQ HOSP IP/OBS HIGH 50: CPT

## 2023-02-01 PROCEDURE — 76536 US EXAM OF HEAD AND NECK: CPT | Mod: 26

## 2023-02-01 PROCEDURE — 99232 SBSQ HOSP IP/OBS MODERATE 35: CPT

## 2023-02-01 RX ORDER — ATORVASTATIN CALCIUM 80 MG/1
40 TABLET, FILM COATED ORAL AT BEDTIME
Refills: 0 | Status: DISCONTINUED | OUTPATIENT
Start: 2023-02-01 | End: 2023-02-03

## 2023-02-01 RX ORDER — SACUBITRIL AND VALSARTAN 24; 26 MG/1; MG/1
1 TABLET, FILM COATED ORAL
Qty: 0 | Refills: 0 | DISCHARGE

## 2023-02-01 RX ORDER — SACUBITRIL AND VALSARTAN 24; 26 MG/1; MG/1
1 TABLET, FILM COATED ORAL ONCE
Refills: 0 | Status: COMPLETED | OUTPATIENT
Start: 2023-02-01 | End: 2023-02-01

## 2023-02-01 RX ORDER — SACUBITRIL AND VALSARTAN 24; 26 MG/1; MG/1
1 TABLET, FILM COATED ORAL
Refills: 0 | Status: DISCONTINUED | OUTPATIENT
Start: 2023-02-01 | End: 2023-02-03

## 2023-02-01 RX ORDER — CLOPIDOGREL BISULFATE 75 MG/1
75 TABLET, FILM COATED ORAL DAILY
Refills: 0 | Status: DISCONTINUED | OUTPATIENT
Start: 2023-02-02 | End: 2023-02-03

## 2023-02-01 RX ORDER — FUROSEMIDE 40 MG
40 TABLET ORAL DAILY
Refills: 0 | Status: DISCONTINUED | OUTPATIENT
Start: 2023-02-02 | End: 2023-02-02

## 2023-02-01 RX ORDER — DAPAGLIFLOZIN 10 MG/1
1 TABLET, FILM COATED ORAL
Qty: 30 | Refills: 0
Start: 2023-02-01 | End: 2023-03-02

## 2023-02-01 RX ORDER — SACUBITRIL AND VALSARTAN 24; 26 MG/1; MG/1
1 TABLET, FILM COATED ORAL
Qty: 60 | Refills: 0
Start: 2023-02-01 | End: 2023-03-02

## 2023-02-01 RX ORDER — INSULIN LISPRO 100/ML
VIAL (ML) SUBCUTANEOUS
Refills: 0 | Status: DISCONTINUED | OUTPATIENT
Start: 2023-02-01 | End: 2023-02-03

## 2023-02-01 RX ORDER — CLOPIDOGREL BISULFATE 75 MG/1
600 TABLET, FILM COATED ORAL ONCE
Refills: 0 | Status: COMPLETED | OUTPATIENT
Start: 2023-02-01 | End: 2023-02-01

## 2023-02-01 RX ORDER — CARVEDILOL PHOSPHATE 80 MG/1
1 CAPSULE, EXTENDED RELEASE ORAL
Qty: 0 | Refills: 0 | DISCHARGE

## 2023-02-01 RX ORDER — SEMAGLUTIDE 0.68 MG/ML
0.25 INJECTION, SOLUTION SUBCUTANEOUS
Qty: 1.5 | Refills: 0
Start: 2023-02-01 | End: 2023-02-28

## 2023-02-01 RX ORDER — FUROSEMIDE 40 MG
1 TABLET ORAL
Qty: 0 | Refills: 0 | DISCHARGE

## 2023-02-01 RX ORDER — INSULIN GLARGINE 100 [IU]/ML
40 INJECTION, SOLUTION SUBCUTANEOUS AT BEDTIME
Refills: 0 | Status: DISCONTINUED | OUTPATIENT
Start: 2023-02-01 | End: 2023-02-01

## 2023-02-01 RX ORDER — INSULIN LISPRO 100/ML
18 VIAL (ML) SUBCUTANEOUS
Refills: 0 | Status: DISCONTINUED | OUTPATIENT
Start: 2023-02-01 | End: 2023-02-03

## 2023-02-01 RX ORDER — MAGNESIUM OXIDE 400 MG ORAL TABLET 241.3 MG
400 TABLET ORAL ONCE
Refills: 0 | Status: COMPLETED | OUTPATIENT
Start: 2023-02-01 | End: 2023-02-01

## 2023-02-01 RX ORDER — INSULIN GLARGINE 100 [IU]/ML
30 INJECTION, SOLUTION SUBCUTANEOUS AT BEDTIME
Refills: 0 | Status: DISCONTINUED | OUTPATIENT
Start: 2023-02-01 | End: 2023-02-03

## 2023-02-01 RX ORDER — ASPIRIN/CALCIUM CARB/MAGNESIUM 324 MG
81 TABLET ORAL DAILY
Refills: 0 | Status: DISCONTINUED | OUTPATIENT
Start: 2023-02-02 | End: 2023-02-03

## 2023-02-01 RX ADMIN — ATORVASTATIN CALCIUM 40 MILLIGRAM(S): 80 TABLET, FILM COATED ORAL at 23:23

## 2023-02-01 RX ADMIN — MAGNESIUM OXIDE 400 MG ORAL TABLET 400 MILLIGRAM(S): 241.3 TABLET ORAL at 17:51

## 2023-02-01 RX ADMIN — Medication 6: at 06:49

## 2023-02-01 RX ADMIN — INSULIN GLARGINE 30 UNIT(S): 100 INJECTION, SOLUTION SUBCUTANEOUS at 23:28

## 2023-02-01 RX ADMIN — CLOPIDOGREL BISULFATE 600 MILLIGRAM(S): 75 TABLET, FILM COATED ORAL at 17:51

## 2023-02-01 RX ADMIN — Medication 10 UNIT(S): at 13:16

## 2023-02-01 RX ADMIN — Medication 2: at 23:26

## 2023-02-01 RX ADMIN — Medication 40 MILLIGRAM(S): at 05:46

## 2023-02-01 RX ADMIN — SACUBITRIL AND VALSARTAN 1 TABLET(S): 24; 26 TABLET, FILM COATED ORAL at 23:23

## 2023-02-01 RX ADMIN — Medication 8: at 13:16

## 2023-02-01 RX ADMIN — Medication 10 UNIT(S): at 09:09

## 2023-02-01 RX ADMIN — Medication 2: at 17:38

## 2023-02-01 RX ADMIN — Medication 18 UNIT(S): at 17:39

## 2023-02-01 RX ADMIN — SACUBITRIL AND VALSARTAN 1 TABLET(S): 24; 26 TABLET, FILM COATED ORAL at 12:20

## 2023-02-01 NOTE — PROGRESS NOTE ADULT - PROBLEM SELECTOR PLAN 1
A1C: 10.1 %  BUN: 24 mg/dL  Creatinine: 1.51 mg/dL  GFR: 52 mL/min/1.73m2  Weight (kg): 96.5  BMI (kg/m2): 33.3  EF: 33%    Please increase lantus       units at night.  Please increase lispro to   units before each meal.  Please continue lispro moderate  dose sliding scale four times daily with meals and at bedtime    Pt's fingerstick glucose goal is 100-180    Will continue to monitor     For discharge, considering basal + GLP-1 (switch victoza to ozempic if covered and affordable; sent to [facility] to find out) + SGLT-2    Pt can follow up at discharge with [facility] by calling  to make an appointment.   Will discuss case with Dr. Norman   and update primary team. A1C: 10.1 %  BUN: 24 mg/dL  Creatinine: 1.51 mg/dL  GFR: 52 mL/min/1.73m2  Weight (kg): 96.5  BMI (kg/m2): 33.3  EF: 33%    Please increase lantus   40    units at night.  Please increase lispro to 8  units before each meal.  Please continue lispro moderate  dose sliding scale four times daily with meals and at bedtime    Pt's fingerstick glucose goal is 100-180    Will continue to monitor     For discharge, considering basal + GLP-1 (switch victoza to ozempic if covered and affordable; sent to [facility] to find out) + SGLT-2    Pt can follow up at discharge with [facility] by calling  to make an appointment.   Will discuss case with Dr. Norman   and update primary team.

## 2023-02-01 NOTE — DIETITIAN INITIAL EVALUATION ADULT - PERSON TAUGHT/METHOD
Discussed importance of daily weights. Reviewed CHO examples and need for consistsent CHO intake at meals. Attempted to review how to read food labels. Some understanding stated however pt focused on meal being dropped off during visit, assume to benefit from further education./verbal instruction/teach back - (Patient repeats in own words)/patient instructed

## 2023-02-01 NOTE — PROGRESS NOTE ADULT - PROBLEM SELECTOR PLAN 1
On admission, patient w/ SOB and LE edema. Ran out of Lasix at home due to increasing dose  - PE: bibasilar crackles, no JVD, 1+ edema, spO2 in 90s on 3L  - EKG non-ischemic. Troponin T 0.03 x2, BNP 3929. , CKMB 3.8  - ECHO 01/31/23: LVEF 33%, mod-severely reduced LVSF, severely dilated LV  - DIURESIS: S/p Lasix 60 mg IVP x 1 in ED, S/P LASIX 40 mg IV BID, now on LASIX 40 mg daily   - I's and O's: Net neg 2.4 L   - GDMT: Start Entresto 24/26 mg BID ($15 copay); will start Farxiga 10 mg daily tomorrow ($15 copay w/[facility])  - NPO after midnight for R+LHC   - Adv HF consulted, f/u recs   - Core measures, daily weights, strict I&Os, PT eval

## 2023-02-01 NOTE — PROGRESS NOTE ADULT - SUBJECTIVE AND OBJECTIVE BOX
Interventional Cardiology PA Adult Progress Note    C.C.:     Subjective Assessment:      ROS Negative except as per Subjective and HPI  	  MEDICATIONS:  furosemide   Injectable 40 milliGRAM(s) IV Push two times a day      albuterol/ipratropium for Nebulization 3 milliLiter(s) Nebulizer every 6 hours PRN        dextrose 50% Injectable 25 Gram(s) IV Push once  dextrose 50% Injectable 12.5 Gram(s) IV Push once  dextrose 50% Injectable 25 Gram(s) IV Push once  dextrose Oral Gel 15 Gram(s) Oral once PRN  glucagon  Injectable 1 milliGRAM(s) IntraMuscular once  insulin glargine Injectable (LANTUS) 30 Unit(s) SubCutaneous at bedtime  insulin lispro (ADMELOG) corrective regimen sliding scale   SubCutaneous at bedtime  insulin lispro (ADMELOG) corrective regimen sliding scale   SubCutaneous Before meals and at bedtime  insulin lispro Injectable (ADMELOG) 10 Unit(s) SubCutaneous three times a day before meals    dextrose 5%. 1000 milliLiter(s) IV Continuous <Continuous>  dextrose 5%. 1000 milliLiter(s) IV Continuous <Continuous>  enoxaparin Injectable 40 milliGRAM(s) SubCutaneous every 24 hours  influenza   Vaccine 0.5 milliLiter(s) IntraMuscular once      	    [PHYSICAL EXAM:  TELEMETRY:  T(C): 36.2 (02-01-23 @ 05:02), Max: 37.1 (01-31-23 @ 18:12)  HR: 112 (02-01-23 @ 05:42) (108 - 114)  BP: 121/79 (02-01-23 @ 05:42) (121/79 - 147/76)  RR: 18 (02-01-23 @ 05:42) (17 - 18)  SpO2: 93% (02-01-23 @ 05:42) (92% - 96%)  Wt(kg): --  I&O's Summary    31 Jan 2023 07:01  -  01 Feb 2023 07:00  --------------------------------------------------------  IN: 300 mL / OUT: 2200 mL / NET: -1900 mL        Pritchett:  Central/PICC/Mid Line:                                         Appearance: Normal	  HEENT:   Normal oral mucosa, PERRL, EOMI	  Neck: Supple, + JVD/ - JVD; Carotid Bruit   Cardiovascular: Normal S1 S2, No JVD, No murmurs,   Respiratory: Lungs clear to auscultation/Decreased Breath Sounds/No Rales, Rhonchi, Wheezing	  Gastrointestinal:  Soft, Non-tender, + BS	  Skin: No rashes, No ecchymoses, No cyanosis  Extremities: Normal range of motion, No clubbing, cyanosis or edema  Vascular: Peripheral pulses palpable 2+ bilaterally  Neurologic: Non-focal  Psychiatry: A & O x 3, Mood & affect appropriate      	    ECG:  	  RADIOLOGY:   DIAGNOSTIC TESTING:  [ ] Echocardiogram:  [ ]  Catheterization:  [ ] Stress Test:    [ ] JOSSY  OTHER: 	    LABS:	 	  CARDIAC MARKERS:                                  15.0   11.92 )-----------( 321      ( 31 Jan 2023 05:30 )             43.4     01-31    132<L>  |  95<L>  |  17  ----------------------------<  394<H>  4.8   |  26  |  1.31<H>    Ca    8.9      31 Jan 2023 05:30  Mg     1.8     01-31    TPro  7.0  /  Alb  4.2  /  TBili  0.6  /  DBili  x   /  AST  20  /  ALT  18  /  AlkPhos  98  01-30    proBNP:   Lipid Profile:   HgA1c:   TSH:   PT/INR - ( 30 Jan 2023 23:32 )   PT: 11.7 sec;   INR: 0.98          PTT - ( 30 Jan 2023 23:32 )  PTT:31.2 sec    ASSESSMENT/PLAN: 	        DVT ppx:  Dispo:     Interventional Cardiology PA Adult Progress Note    Subjective Assessment: Patient seen and examined at bedside. Patient complaining of shortness of breath overnight that caused him to want to use oxygen. Patient stating he feels better sitting upright.     ROS Negative except as per Subjective and HPI  	  MEDICATIONS:  furosemide   Injectable 40 milliGRAM(s) IV Push two times a day  albuterol/ipratropium for Nebulization 3 milliLiter(s) Nebulizer every 6 hours PRN  dextrose 50% Injectable 25 Gram(s) IV Push once  dextrose 50% Injectable 12.5 Gram(s) IV Push once  dextrose 50% Injectable 25 Gram(s) IV Push once  dextrose Oral Gel 15 Gram(s) Oral once PRN  glucagon  Injectable 1 milliGRAM(s) IntraMuscular once  insulin glargine Injectable (LANTUS) 30 Unit(s) SubCutaneous at bedtime  insulin lispro (ADMELOG) corrective regimen sliding scale   SubCutaneous at bedtime  insulin lispro (ADMELOG) corrective regimen sliding scale   SubCutaneous Before meals and at bedtime  insulin lispro Injectable (ADMELOG) 10 Unit(s) SubCutaneous three times a day before meals  dextrose 5%. 1000 milliLiter(s) IV Continuous <Continuous>  dextrose 5%. 1000 milliLiter(s) IV Continuous <Continuous>  enoxaparin Injectable 40 milliGRAM(s) SubCutaneous every 24 hours  influenza   Vaccine 0.5 milliLiter(s) IntraMuscular once      [PHYSICAL EXAM:  TELEMETRY:  T(C): 36.2 (02-01-23 @ 05:02), Max: 37.1 (01-31-23 @ 18:12)  HR: 112 (02-01-23 @ 05:42) (108 - 114)  BP: 121/79 (02-01-23 @ 05:42) (121/79 - 147/76)  RR: 18 (02-01-23 @ 05:42) (17 - 18)  SpO2: 93% (02-01-23 @ 05:42) (92% - 96%)  Wt(kg): --  I&O's Summary    31 Jan 2023 07:01  -  01 Feb 2023 07:00  --------------------------------------------------------  IN: 300 mL / OUT: 2200 mL / NET: -1900 mL                       Appearance: Normal, sitting upright in bed, NAD   HEENT:   Normal oral mucosa, PERRL, EOMI	  Neck: Supple, -JVD  Cardiovascular: + tachycardia, no murmurs, no rales    Respiratory: + faint bibasilar crackles 	  Gastrointestinal:  Soft, Non-tender, + BS	  Skin: No rashes, No ecchymoses, No cyanosis  Extremities: Normal range of motion, No clubbing, cyanosis or edema  Vascular: Peripheral pulses palpable 2+ bilaterally  Neurologic: Non-focal  Psychiatry: A & O x 3, Mood & affect appropriate      DIAGNOSTIC TESTING:  [x ] Echocardiogram 01/31/23:     1. Severely dilated left ventricular size.   2. Moderately-to-severely reduced left ventricular systolic function. EF: 33%   3. Normal right ventricular size and systolic function.   4. No significant valvular disease.   5. No pericardial effusion.      LABS:	 	  CARDIAC MARKERS: Troponin 0.03 x 2              15.0   11.92 )-----------( 321      ( 31 Jan 2023 05:30 )             43.4     01-31    132<L>  |  95<L>  |  17  ----------------------------<  394<H>  4.8   |  26  |  1.31<H>    Ca    8.9      31 Jan 2023 05:30  Mg     1.8     01-31    TPro  7.0  /  Alb  4.2  /  TBili  0.6  /  DBili  x   /  AST  20  /  ALT  18  /  AlkPhos  98  01-30    proBNP: 3929  Lipid Profile: total cholesterol: 229, LDL: 177, HDL: 35  HgA1c: 10.1  TSH: 0.173  PT/INR - ( 30 Jan 2023 23:32 )   PT: 11.7 sec;   INR: 0.98     PTT - ( 30 Jan 2023 23:32 )  PTT:31.2 sec   Interventional Cardiology PA Adult Progress Note    Subjective Assessment: Patient seen and examined at bedside. Patient complaining of shortness of breath overnight that caused him to want to use oxygen. Patient stating he feels better sitting upright and felt worse overnight. Patient w/o other complaints.     ROS Negative except as per Subjective and HPI  	  MEDICATIONS:  furosemide   Injectable 40 milliGRAM(s) IV Push two times a day  albuterol/ipratropium for Nebulization 3 milliLiter(s) Nebulizer every 6 hours PRN  dextrose 50% Injectable 25 Gram(s) IV Push once  dextrose 50% Injectable 12.5 Gram(s) IV Push once  dextrose 50% Injectable 25 Gram(s) IV Push once  dextrose Oral Gel 15 Gram(s) Oral once PRN  glucagon  Injectable 1 milliGRAM(s) IntraMuscular once  insulin glargine Injectable (LANTUS) 30 Unit(s) SubCutaneous at bedtime  insulin lispro (ADMELOG) corrective regimen sliding scale   SubCutaneous at bedtime  insulin lispro (ADMELOG) corrective regimen sliding scale   SubCutaneous Before meals and at bedtime  insulin lispro Injectable (ADMELOG) 10 Unit(s) SubCutaneous three times a day before meals  dextrose 5%. 1000 milliLiter(s) IV Continuous <Continuous>  dextrose 5%. 1000 milliLiter(s) IV Continuous <Continuous>  enoxaparin Injectable 40 milliGRAM(s) SubCutaneous every 24 hours  influenza   Vaccine 0.5 milliLiter(s) IntraMuscular once      [PHYSICAL EXAM:  TELEMETRY:  T(C): 36.2 (02-01-23 @ 05:02), Max: 37.1 (01-31-23 @ 18:12)  HR: 112 (02-01-23 @ 05:42) (108 - 114)  BP: 121/79 (02-01-23 @ 05:42) (121/79 - 147/76)  RR: 18 (02-01-23 @ 05:42) (17 - 18)  SpO2: 93% (02-01-23 @ 05:42) (92% - 96%)  Wt(kg): --  I&O's Summary    31 Jan 2023 07:01  -  01 Feb 2023 07:00  --------------------------------------------------------  IN: 300 mL / OUT: 2200 mL / NET: -1900 mL                       Appearance: Normal, sitting upright in bed, NAD   HEENT:   Normal oral mucosa, PERRL, EOMI	  Neck: Supple, -JVD  Cardiovascular: + tachycardia, no murmurs, no rales    Respiratory: + faint bibasilar crackles 	  Gastrointestinal:  Soft, Non-tender, + BS	  Skin: No rashes, No ecchymoses, No cyanosis  Extremities: Normal range of motion, No clubbing, cyanosis or edema  Vascular: Peripheral pulses palpable 2+ bilaterally  Neurologic: Non-focal  Psychiatry: A & O x 3, Mood & affect appropriate      DIAGNOSTIC TESTING:  [x ] Echocardiogram 01/31/23:     1. Severely dilated left ventricular size.   2. Moderately-to-severely reduced left ventricular systolic function. EF: 33%   3. Normal right ventricular size and systolic function.   4. No significant valvular disease.   5. No pericardial effusion.      LABS:	 	  CARDIAC MARKERS: Troponin 0.03 x 2              15.0   11.92 )-----------( 321      ( 31 Jan 2023 05:30 )             43.4     01-31    132<L>  |  95<L>  |  17  ----------------------------<  394<H>  4.8   |  26  |  1.31<H>    Ca    8.9      31 Jan 2023 05:30  Mg     1.8     01-31    TPro  7.0  /  Alb  4.2  /  TBili  0.6  /  DBili  x   /  AST  20  /  ALT  18  /  AlkPhos  98  01-30    proBNP: 3929  Lipid Profile: total cholesterol: 229, LDL: 177, HDL: 35  HgA1c: 10.1  TSH: 0.173  PT/INR - ( 30 Jan 2023 23:32 )   PT: 11.7 sec;   INR: 0.98     PTT - ( 30 Jan 2023 23:32 )  PTT:31.2 sec

## 2023-02-01 NOTE — PROGRESS NOTE ADULT - ASSESSMENT
63 yo male, w/ PMH of HTN, T2DM, CHF, CAD s/p prior cardiac catheterizations with stents, and COPD (not O2 dependent), who presented to [facility] ED 1/30 c/o worsening SOB x 4 days and lower extremity edema admitted  for diuresis, also with uncontrolled T2DM with steroid induced hyperglycemia and thyroid goiter.

## 2023-02-01 NOTE — PROGRESS NOTE ADULT - ASSESSMENT
63 yo male, POOR HISTORIAN, w/ PMH of HTN, DM II, CHF (EF unknown), CAD s/p prior cardiac catheterizations with stents, and COPD (not O2 dependent), who presented to [facility] ED 1/30 c/o worsening SOB x 4 days. BNP elevated and CXR with evidence of interstitial edema. Patient admitted to [facility] for suspected acute on chronic heart failure exacerbation requiring IV diuresis.  Patient near euvolemic, switched to Lasix 40 mg IV once daily from BID on 02/01/2023. Patient started on GDMT and will undergo a right and left heart cath with Dr. Lim on 2/2/2023.       ASA III				Mallampati class: III            Anginal Class: III    Sedation Plan:   [  ] None   [ x ] Moderate   [  ]  Deep    [  ]  General Anesthesia   Patient Is Suitable Candidate For Sedation?     [ x ] Yes   [  ] No   [  ] Not Applicable   Cath Order Entered: [ x ] Yes  DAPT LOAD Ordered: [ x ] Yes  [  ] No load 2/2 ________  Pre-Cath fluids Ordered: [  ] Yes  [ x ] Not indicated 2/2 - patient w/ CHF exacerbation   Risks Benefits Annotation:     CARDIAC CATH: Risks & benefits of procedure and sedation and risks and benefits for the alternative therapy have been explained to the patient and/or HCP in layman’s terms including but not limited to: allergic reaction, bleeding, infection, arrhythmia, respiratory compromise, renal and vascular compromise, limb damage, MI, CVA, emergent CABG/Vascular Surgery and death. Informed consent obtained and in chart

## 2023-02-01 NOTE — PROGRESS NOTE ADULT - PROBLEM SELECTOR PLAN 2
Admits to productive cough. WBC 16.19 on admission, since downtrended   - CXR 1/30/23: bibasilar opacities/pleural effusions, cardiomegaly, thoracic aortic calcification, satisfactory position left ICD, thoracic spine degenerative changes, L coracoclavicular joint variant  - BCx x2 testing. UA normal. Lactate 1.3  - Afebrile, monitor off abx

## 2023-02-01 NOTE — PROGRESS NOTE ADULT - PROBLEM SELECTOR PLAN 7
Total cholesterol: 229, HDL: 35, LDL: 177  - Started on Atorvastatin 40 mg daily Total cholesterol: 229, HDL: 35, LDL: 177  - Started on Atorvastatin 40 mg daily  - Consider Zetia as outpatient

## 2023-02-01 NOTE — DIETITIAN INITIAL EVALUATION ADULT - OTHER CALCULATIONS
5'7''  pounds +-10%  Wt 212 pounds, BMI 33.2, %[id number]  IBW used to calculate energy needs due to pt's current body weight exceeding 120% of IBW  Adjust for age, CHF; fluids per team

## 2023-02-01 NOTE — DIETITIAN INITIAL EVALUATION ADULT - OTHER INFO
63 yo male, POOR HISTORIAN, PMH HTN, DM II, CHF (EF unknown), CAD s/p prior cardiac catheterizations with stents, and COPD (not O2 dependent), who presented to [facility] ED 1/30 c/o worsening SOB x4 days. BNP elevated and CXR with evidence of interstitial edema. Pt admitted to [facility] for suspected acute on chronic heart failure exacerbation requiring IV diuresis. S/p ECHO 1/31: LVEF 33%, mod-severely reduced LVSF, severely dilated LV. Pt near euvolemic, switched to Lasix 40 mg IV once daily from BID on 2/1/2023. Pt started on GDMT and will undergo a right and left heart cath with Dr. Lim on 2/2/2023.    Pt seen this Afternoon on [facility]. RN about to give insulin for Pending lunch meal (diet order: DASH TLC/CONSCHO). PTA pt reports only having 1 meal, usually dinner which is ~2 cups of oatmeal + a regular applesauce. Per food label of pt reported oatmeal brand, oatmeal provides ~108gm CHO alone. Assume over consuming CHO at meal. Usually does not have a large breakfast/lunch, has coffee and a [place]. Noted A1c 10.1%, POCT 330 272 293, . Endo consulted. PTA pt reports AM/PM insulin. Now ordered for corrective sliding scale at meals/bedtime, Lantus at bedtime and ADMELOG pre meals.  pounds and denies wt changes; +Scale however does not taking daily wts. TG 35, NON , . No pain. Jose 20, 1+BL LE Edema, No pressure ulcers. GI WDL per flow sheets.    Please see below for nutritions recommendations.

## 2023-02-01 NOTE — DIETITIAN INITIAL EVALUATION ADULT - ADD RECOMMEND
1. Monitor PO intake/appetite, GI distress, diet tolerance, labs, weights.  2. Honor pt food preferences as able.  3. RD to remain available for additional nutrition interventions as needed.  * High Nutrition Risk - in attempts to provide further education on f/u

## 2023-02-01 NOTE — PROGRESS NOTE ADULT - SUBJECTIVE AND OBJECTIVE BOX
OVERNIGHT: No acute events overnight.   SUBJECTIVE: Patient was seen and examined this morning. He reports an episode of SOB this morning. Now resolved with supplemental O2 NC. Planned for R/L cath tomorrow.    CAPILLARY BLOOD GLUCOSE & INSULIN RECEIVED  Yesterday  - Dinner FS mg/dL = 8 units of premeal Lispro + 5 units of Lispro sliding scale. Ate chicken, veggies, salad, potato, and pudding.  - Bedtime FS mg/dL = 30 units of Lantus + 2 units Lispro sliding scale.     Today  - Breakfast FS mg/dL = 10 units of premeal Lispro + 6 units of Lispro sliding scale. Ate oatmeal with peach topping, juevos rancheros.  - Lunch FS mg/dL = 10 units of premeal Lispro + 8 units of Lispro sliding scale.     272 mg/dL ( @ 06:39)  293 mg/dL ( @ 21:50)  400 mg/dL ( @ 17:01)    REVIEW OF SYSTEMS  Constitutional:  Negative fever, chills or loss of appetite.  Eyes:  Negative blurry vision or double vision.  Cardiovascular:  Negative for chest pain or palpitations.  Respiratory:  Negative for cough, wheezing. (+) shortness of breath.    Gastrointestinal:  Negative for nausea, vomiting, diarrhea, constipation, or abdominal pain.  Genitourinary:  Negative frequency, urgency or dysuria.  Neurologic:  No headache, confusion, dizziness, lightheadedness.    PHYSICAL EXAM  Vital Signs Last 24 Hrs  T(C): 36.6 (2023 09:41), Max: 37.1 (2023 18:12)  T(F): 97.8 (2023 09:41), Max: 98.7 (2023 18:12)  HR: 112 (2023 09:00) (108 - 114)  BP: 116/77 (2023 09:00) (116/77 - 147/76)  BP(mean): 91 (2023 09:00) (91 - 91)  RR: 18 (2023 09:00) (17 - 18)  SpO2: 97% (2023 09:00) (92% - 97%)    Parameters below as of 2023 09:00  Patient On (Oxygen Delivery Method): nasal cannula  O2 Flow (L/min): 2    Constitutional:  NAD. Obese.  HEENT: NCAT, MMM, OP clear, EOMI, , no proptosis or lid retraction  Neck: + thyromegaly  Respiratory: bibasilar crackles  Cardiovascular: regular rhythm, normal S1 and S2, no audible murmurs, 1+ b/l les edema  GI: soft, NT/ND, no masses/HSM appreciated.  Neurology: no tremors, DTR 2+  Skin: no visible rashes/lesions.   Psychiatric: AAO x 3, normal affect/mood.  Ext: radial pulses intact, DP pulses intact, extremities warm, no cyanosis, clubbing    LABS  CBC - WBC/HGB/HTC/PLT: 15.60/14.4/42.5/344 (23)  BMP - Na/K/Cl/Bicarb/BUN/Cr/Gluc: 131/4.2/91/25/24/1.51/328 (23)  Anion Gap: 15 (23)  eGFR: 52 (23)  Calcium: 9.1 (23)  Phosphorus: -- (23)  Magnesium: 1.9 (23)  LFT - Alb/Tprot/Tbili/Dbili/AlkPhos/ALT/AST: 4.2/--/0.6/--/98/18/20 (23)  PT/aPTT/INR: 11.7/31.2/0.98 (23)   Thyroid Stimulating Hormone, Serum: 0.173 (23)  Total T4/Free T4: --/1.150 (23)  Urinalysis Basic - ( 2023 03:40 )    Color: Yellow / Appearance: Clear / S.010 / pH: x  Gluc: x / Ketone: NEGATIVE  / Bili: Negative / Urobili: 0.2 E.U./dL   Blood: x / Protein: NEGATIVE mg/dL / Nitrite: NEGATIVE   Leuk Esterase: NEGATIVE / RBC: < 5 /HPF / WBC < 5 /HPF   Sq Epi: x / Non Sq Epi: 0-5 /HPF / Bacteria: None /HPF        MEDICATIONS  MEDICATIONS  (STANDING):  dextrose 5%. 1000 milliLiter(s) (50 mL/Hr) IV Continuous <Continuous>  dextrose 5%. 1000 milliLiter(s) (100 mL/Hr) IV Continuous <Continuous>  dextrose 50% Injectable 25 Gram(s) IV Push once  dextrose 50% Injectable 12.5 Gram(s) IV Push once  dextrose 50% Injectable 25 Gram(s) IV Push once  enoxaparin Injectable 40 milliGRAM(s) SubCutaneous every 24 hours  glucagon  Injectable 1 milliGRAM(s) IntraMuscular once  influenza   Vaccine 0.5 milliLiter(s) IntraMuscular once  insulin glargine Injectable (LANTUS) 30 Unit(s) SubCutaneous at bedtime  insulin lispro (ADMELOG) corrective regimen sliding scale   SubCutaneous Before meals and at bedtime  insulin lispro Injectable (ADMELOG) 10 Unit(s) SubCutaneous three times a day before meals  sacubitril 24 mG/valsartan 26 mG 1 Tablet(s) Oral two times a day    MEDICATIONS  (PRN):  albuterol/ipratropium for Nebulization 3 milliLiter(s) Nebulizer every 6 hours PRN Shortness of Breath and/or Wheezing  dextrose Oral Gel 15 Gram(s) Oral once PRN Blood Glucose LESS THAN 70 milliGRAM(s)/deciliter

## 2023-02-01 NOTE — PROGRESS NOTE ADULT - PROBLEM SELECTOR PLAN 6
a1c: 10.1; home meds: Victoza 1.8 under subcut in Am, Tresiba 20-26 units nightly  - Endo consulted - rec Lantus 10 units nightly, lispro 10 units TID with meals  - Will start Farixga for CHF on 2/2/2023   - Continue MISS a1c: 10.1; home meds: Victoza 1.8 under subcut in Am, Tresiba 20-26 units nightly  - Endo consulted - rec Lantus 40 units nightly, lispro 18 units TID with meals  - Will start Farixga for CHF on 2/2/2023   - Continue MISS

## 2023-02-01 NOTE — PROGRESS NOTE ADULT - PROBLEM SELECTOR PLAN 5
Stable, -140s  - Home meds: Entresto 24/26 mg BID (noncompliant)  - Continue Entresto 24/26 mg BID, Lasix 40 mg IVP qd

## 2023-02-01 NOTE — PROGRESS NOTE ADULT - PROBLEM SELECTOR PLAN 2
TSH 0.173 and total t4 7.88. TSH artificially low due to solumedrol 125mg given the night before labs drawn.  Will repeat TFTs in a few days.

## 2023-02-01 NOTE — DIETITIAN INITIAL EVALUATION ADULT - PERTINENT MEDS FT
MEDICATIONS  (STANDING):  atorvastatin 40 milliGRAM(s) Oral at bedtime  clopidogrel Tablet 600 milliGRAM(s) Oral once  dextrose 5%. 1000 milliLiter(s) (50 mL/Hr) IV Continuous <Continuous>  dextrose 5%. 1000 milliLiter(s) (100 mL/Hr) IV Continuous <Continuous>  dextrose 50% Injectable 25 Gram(s) IV Push once  dextrose 50% Injectable 12.5 Gram(s) IV Push once  dextrose 50% Injectable 25 Gram(s) IV Push once  enoxaparin Injectable 40 milliGRAM(s) SubCutaneous every 24 hours  glucagon  Injectable 1 milliGRAM(s) IntraMuscular once  influenza   Vaccine 0.5 milliLiter(s) IntraMuscular once  insulin glargine Injectable (LANTUS) 30 Unit(s) SubCutaneous at bedtime  insulin lispro (ADMELOG) corrective regimen sliding scale   SubCutaneous Before meals and at bedtime  insulin lispro Injectable (ADMELOG) 10 Unit(s) SubCutaneous three times a day before meals  magnesium oxide 400 milliGRAM(s) Oral once  sacubitril 24 mG/valsartan 26 mG 1 Tablet(s) Oral two times a day    MEDICATIONS  (PRN):  albuterol/ipratropium for Nebulization 3 milliLiter(s) Nebulizer every 6 hours PRN Shortness of Breath and/or Wheezing  dextrose Oral Gel 15 Gram(s) Oral once PRN Blood Glucose LESS THAN 70 milliGRAM(s)/deciliter

## 2023-02-01 NOTE — DIETITIAN INITIAL EVALUATION ADULT - PERTINENT LABORATORY DATA
Made new start Botox on 09/25/20 [facility] location with Dr Jc Duty  02-01    131<L>  |  91<L>  |  24<H>  ----------------------------<  328<H>  4.2   |  25  |  1.51<H>    Ca    9.1      01 Feb 2023 11:15  Mg     1.9     02-01    TPro  7.0  /  Alb  4.2  /  TBili  0.6  /  DBili  x   /  AST  20  /  ALT  18  /  AlkPhos  98  01-30  POCT Blood Glucose.: 330 mg/dL (02-01-23 @ 12:40)  A1C with Estimated Average Glucose Result: 10.1 % (01-31-23 @ 05:30)

## 2023-02-02 LAB
ALBUMIN SERPL ELPH-MCNC: 3.8 G/DL — SIGNIFICANT CHANGE UP (ref 3.3–5)
ALP SERPL-CCNC: 84 U/L — SIGNIFICANT CHANGE UP (ref 40–120)
ALT FLD-CCNC: 12 U/L — SIGNIFICANT CHANGE UP (ref 10–45)
ANION GAP SERPL CALC-SCNC: 11 MMOL/L — SIGNIFICANT CHANGE UP (ref 5–17)
APTT BLD: 32.6 SEC — SIGNIFICANT CHANGE UP (ref 27.5–35.5)
AST SERPL-CCNC: 11 U/L — SIGNIFICANT CHANGE UP (ref 10–40)
BASOPHILS # BLD AUTO: 0.09 K/UL — SIGNIFICANT CHANGE UP (ref 0–0.2)
BASOPHILS NFR BLD AUTO: 0.7 % — SIGNIFICANT CHANGE UP (ref 0–2)
BILIRUB SERPL-MCNC: 0.3 MG/DL — SIGNIFICANT CHANGE UP (ref 0.2–1.2)
BUN SERPL-MCNC: 33 MG/DL — HIGH (ref 7–23)
CALCIUM SERPL-MCNC: 9 MG/DL — SIGNIFICANT CHANGE UP (ref 8.4–10.5)
CHLORIDE SERPL-SCNC: 99 MMOL/L — SIGNIFICANT CHANGE UP (ref 96–108)
CO2 SERPL-SCNC: 25 MMOL/L — SIGNIFICANT CHANGE UP (ref 22–31)
COHGB MFR BLDA: 2 % — SIGNIFICANT CHANGE UP
COHGB MFR BLDV: 1.2 % — SIGNIFICANT CHANGE UP
COHGB MFR BLDV: 1.4 % — SIGNIFICANT CHANGE UP
CREAT SERPL-MCNC: 1.42 MG/DL — HIGH (ref 0.5–1.3)
EGFR: 56 ML/MIN/1.73M2 — LOW
EOSINOPHIL # BLD AUTO: 0.35 K/UL — SIGNIFICANT CHANGE UP (ref 0–0.5)
EOSINOPHIL NFR BLD AUTO: 2.9 % — SIGNIFICANT CHANGE UP (ref 0–6)
GLUCOSE BLDC GLUCOMTR-MCNC: 132 MG/DL — HIGH (ref 70–99)
GLUCOSE BLDC GLUCOMTR-MCNC: 190 MG/DL — HIGH (ref 70–99)
GLUCOSE BLDC GLUCOMTR-MCNC: 267 MG/DL — HIGH (ref 70–99)
GLUCOSE BLDC GLUCOMTR-MCNC: 326 MG/DL — HIGH (ref 70–99)
GLUCOSE SERPL-MCNC: 124 MG/DL — HIGH (ref 70–99)
HCT VFR BLD CALC: 43.1 % — SIGNIFICANT CHANGE UP (ref 39–50)
HGB BLD CALC-MCNC: 14.9 G/DL — SIGNIFICANT CHANGE UP (ref 12.6–17.4)
HGB BLD CALC-MCNC: 15 G/DL — SIGNIFICANT CHANGE UP (ref 12.6–17.4)
HGB BLD-MCNC: 14.7 G/DL — SIGNIFICANT CHANGE UP (ref 13–17)
HGB BLDA-MCNC: 14.8 G/DL — SIGNIFICANT CHANGE UP (ref 12.6–17.4)
IMM GRANULOCYTES NFR BLD AUTO: 0.4 % — SIGNIFICANT CHANGE UP (ref 0–0.9)
INR BLD: 0.96 — SIGNIFICANT CHANGE UP (ref 0.88–1.16)
LYMPHOCYTES # BLD AUTO: 18.7 % — SIGNIFICANT CHANGE UP (ref 13–44)
LYMPHOCYTES # BLD AUTO: 2.25 K/UL — SIGNIFICANT CHANGE UP (ref 1–3.3)
MAGNESIUM SERPL-MCNC: 2.1 MG/DL — SIGNIFICANT CHANGE UP (ref 1.6–2.6)
MCHC RBC-ENTMCNC: 30.1 PG — SIGNIFICANT CHANGE UP (ref 27–34)
MCHC RBC-ENTMCNC: 34.1 GM/DL — SIGNIFICANT CHANGE UP (ref 32–36)
MCV RBC AUTO: 88.1 FL — SIGNIFICANT CHANGE UP (ref 80–100)
METHGB MFR BLDA: 0.4 % — SIGNIFICANT CHANGE UP
METHGB MFR BLDV: 0.3 % — SIGNIFICANT CHANGE UP
METHGB MFR BLDV: 0.4 % — SIGNIFICANT CHANGE UP
MONOCYTES # BLD AUTO: 1.11 K/UL — HIGH (ref 0–0.9)
MONOCYTES NFR BLD AUTO: 9.2 % — SIGNIFICANT CHANGE UP (ref 2–14)
NEUTROPHILS # BLD AUTO: 8.17 K/UL — HIGH (ref 1.8–7.4)
NEUTROPHILS NFR BLD AUTO: 68.1 % — SIGNIFICANT CHANGE UP (ref 43–77)
NRBC # BLD: 0 /100 WBCS — SIGNIFICANT CHANGE UP (ref 0–0)
OXYHGB MFR BLDA: 93.6 % — SIGNIFICANT CHANGE UP (ref 90–95)
PLATELET # BLD AUTO: 343 K/UL — SIGNIFICANT CHANGE UP (ref 150–400)
POTASSIUM SERPL-MCNC: 4.1 MMOL/L — SIGNIFICANT CHANGE UP (ref 3.5–5.3)
POTASSIUM SERPL-SCNC: 4.1 MMOL/L — SIGNIFICANT CHANGE UP (ref 3.5–5.3)
PROT SERPL-MCNC: 6.7 G/DL — SIGNIFICANT CHANGE UP (ref 6–8.3)
PROTHROM AB SERPL-ACNC: 11.4 SEC — SIGNIFICANT CHANGE UP (ref 10.5–13.4)
RBC # BLD: 4.89 M/UL — SIGNIFICANT CHANGE UP (ref 4.2–5.8)
RBC # FLD: 13.2 % — SIGNIFICANT CHANGE UP (ref 10.3–14.5)
SAO2 % BLDA: 95.9 % — SIGNIFICANT CHANGE UP (ref 94–98)
SAO2 % BLDV: 61 % — LOW (ref 67–88)
SAO2 % BLDV: 61 % — LOW (ref 67–88)
SODIUM SERPL-SCNC: 135 MMOL/L — SIGNIFICANT CHANGE UP (ref 135–145)
THYROGLOB AB FLD IA-ACNC: <20 IU/ML — SIGNIFICANT CHANGE UP
THYROGLOB AB SERPL-ACNC: <20 IU/ML — SIGNIFICANT CHANGE UP
THYROGLOB SERPL-MCNC: 185 NG/ML — HIGH (ref 1.6–59.9)
THYROPEROXIDASE AB SERPL-ACNC: <10 IU/ML — SIGNIFICANT CHANGE UP
WBC # BLD: 12.02 K/UL — HIGH (ref 3.8–10.5)
WBC # FLD AUTO: 12.02 K/UL — HIGH (ref 3.8–10.5)

## 2023-02-02 PROCEDURE — 99233 SBSQ HOSP IP/OBS HIGH 50: CPT

## 2023-02-02 PROCEDURE — 99231 SBSQ HOSP IP/OBS SF/LOW 25: CPT

## 2023-02-02 PROCEDURE — 93460 R&L HRT ART/VENTRICLE ANGIO: CPT | Mod: 26

## 2023-02-02 PROCEDURE — 99152 MOD SED SAME PHYS/QHP 5/>YRS: CPT

## 2023-02-02 PROCEDURE — 99222 1ST HOSP IP/OBS MODERATE 55: CPT

## 2023-02-02 RX ORDER — METOPROLOL TARTRATE 50 MG
12.5 TABLET ORAL DAILY
Refills: 0 | Status: DISCONTINUED | OUTPATIENT
Start: 2023-02-02 | End: 2023-02-02

## 2023-02-02 RX ORDER — METOPROLOL TARTRATE 50 MG
25 TABLET ORAL EVERY 24 HOURS
Refills: 0 | Status: DISCONTINUED | OUTPATIENT
Start: 2023-02-02 | End: 2023-02-03

## 2023-02-02 RX ORDER — FUROSEMIDE 40 MG
40 TABLET ORAL DAILY
Refills: 0 | Status: DISCONTINUED | OUTPATIENT
Start: 2023-02-03 | End: 2023-02-03

## 2023-02-02 RX ORDER — SODIUM CHLORIDE 9 MG/ML
500 INJECTION INTRAMUSCULAR; INTRAVENOUS; SUBCUTANEOUS
Refills: 0 | Status: DISCONTINUED | OUTPATIENT
Start: 2023-02-02 | End: 2023-02-03

## 2023-02-02 RX ORDER — DAPAGLIFLOZIN 10 MG/1
10 TABLET, FILM COATED ORAL EVERY 24 HOURS
Refills: 0 | Status: DISCONTINUED | OUTPATIENT
Start: 2023-02-02 | End: 2023-02-03

## 2023-02-02 RX ORDER — SPIRONOLACTONE 25 MG/1
25 TABLET, FILM COATED ORAL DAILY
Refills: 0 | Status: DISCONTINUED | OUTPATIENT
Start: 2023-02-02 | End: 2023-02-03

## 2023-02-02 RX ADMIN — Medication 81 MILLIGRAM(S): at 05:34

## 2023-02-02 RX ADMIN — SPIRONOLACTONE 25 MILLIGRAM(S): 25 TABLET, FILM COATED ORAL at 19:37

## 2023-02-02 RX ADMIN — Medication 8: at 21:55

## 2023-02-02 RX ADMIN — SACUBITRIL AND VALSARTAN 1 TABLET(S): 24; 26 TABLET, FILM COATED ORAL at 05:33

## 2023-02-02 RX ADMIN — CLOPIDOGREL BISULFATE 75 MILLIGRAM(S): 75 TABLET, FILM COATED ORAL at 05:33

## 2023-02-02 RX ADMIN — INSULIN GLARGINE 30 UNIT(S): 100 INJECTION, SOLUTION SUBCUTANEOUS at 21:53

## 2023-02-02 RX ADMIN — SODIUM CHLORIDE 50 MILLILITER(S): 9 INJECTION INTRAMUSCULAR; INTRAVENOUS; SUBCUTANEOUS at 19:44

## 2023-02-02 RX ADMIN — SACUBITRIL AND VALSARTAN 1 TABLET(S): 24; 26 TABLET, FILM COATED ORAL at 21:53

## 2023-02-02 RX ADMIN — Medication 25 MILLIGRAM(S): at 21:54

## 2023-02-02 RX ADMIN — Medication 2: at 11:50

## 2023-02-02 RX ADMIN — DAPAGLIFLOZIN 10 MILLIGRAM(S): 10 TABLET, FILM COATED ORAL at 19:37

## 2023-02-02 RX ADMIN — ATORVASTATIN CALCIUM 40 MILLIGRAM(S): 80 TABLET, FILM COATED ORAL at 21:54

## 2023-02-02 RX ADMIN — ENOXAPARIN SODIUM 40 MILLIGRAM(S): 100 INJECTION SUBCUTANEOUS at 21:54

## 2023-02-02 NOTE — PROGRESS NOTE ADULT - PROBLEM SELECTOR PLAN 7
Total cholesterol: 229, HDL: 35, LDL: 177  - Started on Atorvastatin 40 mg daily  - Consider Zetia as outpatient

## 2023-02-02 NOTE — PROGRESS NOTE ADULT - ASSESSMENT
63 yo male, POOR HISTORIAN, w/ PMH of HTN, DM II, CHF (EF unknown), CAD s/p prior cardiac catheterizations with stents, and COPD (not O2 dependent), who presented to [facility] ED 1/30 c/o worsening SOB x 4 days. BNP elevated and CXR with evidence of interstitial edema. Patient admitted to [facility] for suspected acute on chronic heart failure exacerbation requiring IV diuresis.  Patient near euvolemic, switched to Lasix 40 mg IV once daily from BID on 02/01/2023. Patient started on GDMT and will undergo a right and left heart cath with Dr. Lim on 2/2/2023.       ASA III				Mallampati class: III            Anginal Class: III    Sedation Plan:   [  ] None   [ x ] Moderate   [  ]  Deep    [  ]  General Anesthesia   Patient Is Suitable Candidate For Sedation?     [ x ] Yes   [  ] No   [  ] Not Applicable   Cath Order Entered: [ x ] Yes  DAPT LOAD Ordered: [ x ] Yes  [  ] No load 2/2 ________  Pre-Cath fluids Ordered: [  ] Yes  [ x ] Not indicated 2/2 - patient w/ CHF exacerbation   Risks Benefits Annotation:     CARDIAC CATH: Risks & benefits of procedure and sedation and risks and benefits for the alternative therapy have been explained to the patient and/or HCP in layman’s terms including but not limited to: allergic reaction, bleeding, infection, arrhythmia, respiratory compromise, renal and vascular compromise, limb damage, MI, CVA, emergent CABG/Vascular Surgery and death. Informed consent obtained and in chart 61 yo male, POOR HISTORIAN, w/ PMH of HTN, DM II, CHF (EF unknown), CAD s/p prior cardiac catheterizations with stents, and COPD (not O2 dependent), who presented to [facility] ED 1/30 c/o worsening SOB x 4 days. BNP elevated and CXR with evidence of interstitial edema. Patient admitted to [facility] for suspected acute on chronic heart failure exacerbation requiring IV diuresis.  Patient near euvolemic, switched to Lasix 40 mg IV once daily from BID on 02/01/2023. Patient started on GDMT and will undergo a right and left heart cath with Dr. Lim on 2/2/2023.    61 yo male, POOR HISTORIAN, w/ PMH of HTN, DM II, CHF (EF unknown), CAD s/p prior cardiac catheterizations with stents, and COPD (not O2 dependent), who presented to [facility] ED 1/30 c/o worsening SOB x 4 days. BNP elevated and CXR with evidence of interstitial edema. Patient admitted to [facility] for suspected acute on chronic heart failure exacerbation requiring IV diuresis.  Patient near euvolemic, s/p IV diuresis, switched to Lasix 40 mg orally on 2/2/2023. Patient started on GDMT and will undergo a right and left heart cath with Dr. Lim on 2/2/2023.

## 2023-02-02 NOTE — PROGRESS NOTE ADULT - PROBLEM SELECTOR PLAN 2
TSH 0.173 and total t4 7.88. TSH artificially low due to solumedrol 125mg given the night before labs drawn.  Will repeat TFTs tomorrow

## 2023-02-02 NOTE — PROGRESS NOTE ADULT - PROBLEM SELECTOR PLAN 4
Patient is s/p prior cardiac catheterization with stents at [facility]  - Home meds: Aspirin 81 mg daily, Plavix 75 mg daily (although questionable compliance)  - Will go for [facility] 02/02/22; precathed/consented, see above, F/U results   - Loaded with Plavix 600 mg x 1 and started on Atorva 40 mg daily
Patient is s/p prior cardiac catheterization with stents at [facility]  - Home meds: Aspirin 81 mg daily, Plavix 75 mg daily (although questionable compliance)  - Will go for R[facility] tomororw 02/02/22; precathed/consented, see above   - Loaded with Plavix 600 mg x 1 and started on Atorva 40 mg daily

## 2023-02-02 NOTE — PROGRESS NOTE ADULT - ASSESSMENT
61 yo male, w/ PMH of HTN, T2DM, CHF, CAD s/p prior cardiac catheterizations with stents, and COPD (not O2 dependent), who presented to [facility] ED 1/30 c/o worsening SOB x 4 days and lower extremity edema admitted  for diuresis, also with uncontrolled T2DM with steroid induced hyperglycemia and thyroid goiter, now s/p R/L cath on 2/2/2023.

## 2023-02-02 NOTE — PROGRESS NOTE ADULT - PROBLEM SELECTOR PLAN 1
A1C: 10.1 %  BUN: 33 mg/dL  Creatinine: 1.42 mg/dL  GFR: 56 mL/min/1.73m2  Weight (kg): 96.5  BMI (kg/m2): 33.3  EF: 33%    Please increase lantus       units at night.  Please increase lispro to units before each meal.  Please continue lispro moderate  dose sliding scale four times daily with meals and at bedtime    Pt's fingerstick glucose goal is 100-180    Will continue to monitor     For discharge, considering basal + GLP-1 (switch victoza to ozempic - covered and affordable per patient) + SGLT-2    Pt can follow up at discharge with [facility] by calling  to make an appointment.   Will discuss case with Dr. Norman   and update primary team. A1C: 10.1 %  BUN: 33 mg/dL  Creatinine: 1.42 mg/dL  GFR: 56 mL/min/1.73m2  Weight (kg): 96.5  BMI (kg/m2): 33.3  EF: 33%    Please continue lantus     30  units at night.  Please continue lispro 18 units before each meal.  Please continue lispro moderate  dose sliding scale four times daily with meals and at bedtime    Pt's fingerstick glucose goal is 100-180    Will continue to monitor     For discharge, considering basal + GLP-1 (switch victoza to ozempic - covered and affordable per patient) + SGLT-2    Pt can follow up at discharge with [facility] by calling  to make an appointment.   Will discuss case with Dr. Norman   and update primary team.

## 2023-02-02 NOTE — PROGRESS NOTE ADULT - NS ATTEND AMEND GEN_ALL_CORE FT
Pt seen on rounds this afternoon.  Glucoses remain quite high--270-330 since last night.  Unclear to what extent this is a residual effect of the steroids  given in the ED at the time of admission.  He is already on his usual dose of basal insulin, and he was not on premeal insulin at home  He reports feeling overall better, and his lungs are essentially clear.   (Has faint rales at the R base)  No LE edema.  --To increase the Lantus to 40 units, the premeal lispro to 18 units.  --Still awaiting thyroid ultrasound to evaluate for nodularity  --Doubt that the pt will be willing to take premeal insulin post discharge, but will need to see if his insulin requirements decrease as the effect of the Solu-Medrol wears off  --Repeat TFTs in another 1-2 days   --Pt's voice impresses me today as distinctly hoarse.  Regardless of whether it has actually been present for several years, he should ideally have indirect laryngoscopy to evaluate for vocal cord dysfunction
62M with Essential HTN, DM II, Chronic Systolic HFrEF 2/2 iCM,  CAD s/p prior cardiac catheterizations with stents (last at [facility] but patient unsure of procedure details), and COPD (not O2 dependent), who presented with acute on chronic systolic heart failure exacerbation requiring IV diuresis. Patient reports the presentation is consistent with 2 prior episodes for which he needed coronary intervention and he suspects that he has a new blockage as his symptoms of dyspnea are his anginal equivalent.  Physical Exam notable for: elderly patient sitting up in chair in NAD, flat neck veins, RRR, no MGR detected, clear lungs, overly nourished abdomen, no fluid wave detected, no pretibial pitting edema, no ankle edema, skin WWP, A&Ox3  Plan for:  NPO for R/LHC planned tomorrow 2/2/23  Transition from IV lasix to Lasix 40mg po daily to maintain euvolemia (time for 10AM)  Initiate Entresto 24/26 BID, send to [facility] for price check  send Farxiga to [facility] for price check  Will initiate beta blocker in staged manner if tolerates ARNI on boarding  Bedside CHF Education, supplies to be given to patient  Advanced CHF consulted for co-management   Ambulatory saturation test requested  Future planning: patient to have 1wk f/u appt made with Cardiologist for quality improvement CHF readmission risk reduction  Melissa Guerrero M.D.  Cardiology Attending
62M with Essential HTN, DM II, Chronic Systolic HFrEF 2/2 iCM,  CAD s/p prior cardiac catheterizations with stents (last at [place] but patient unsure of procedure details), and COPD (not O2 dependent), who presented with acute on chronic systolic heart failure exacerbation requiring IV diuresis. Patient reports the presentation is consistent with 2 prior episodes for which he needed coronary intervention and he suspects that he has a new blockage as his symptoms of dyspnea are his anginal equivalent.  Physical Exam notable for: elderly patient in NAD, euvolemic on exam, flat neck veins, clear lungs, no peripheral edema, RRR  Plan for:  NPO for R/LHC planned today 2/2/23  Lasix 40mg po daily (last given 2/2 AM), will dose standing diuretics pending RHC results  CHF GDMT: Entresto 24/26 BID ($15 co pay), initiate Farxiga 10mg po daily ($15 co pay), Spironolactone 25mg po daily - stagger meds  Initiate Toprol 25XL daily pending RHC results  Bedside CHF Education, supplies to be given to patient  Advanced CHF following for co-management   Ambulatory saturation test pending  Future planning: patient to have 1wk f/u appt made with Cardiologist for quality improvement CHF readmission risk reduction  Melissa Guerrero M.D.  Cardiology Attending.

## 2023-02-02 NOTE — CONSULT NOTE ADULT - ASSESSMENT
63 yo male, POOR HISTORIAN, w/ PMH of HTN, DM II, CHF (EF unknown), CAD s/p prior cardiac catheterizations with stents, and COPD (not O2 dependent), who presented to [facility] ED 1/30 c/o worsening SOB x 4 days. Found to be in acute decompensated HF. S/P IV diuresis. Plan for RHC/LHC on 2/2/23.      Review of studies:   1/23: EF 30-35%, Moderately-to-severely reduced left ventricular systolic function.  No significant valvular disease. LV 6.58      #HFrEF   Etiology: likely ischemic   Diuretics: PO lasix 40 daily   GDMT: entresto 24/26 po bid; farxiga 10; not on BB currently    - plan for R+LHC today     #CAD  - c/w ASA, plavix, atorvastatin     *incomplete 61 yo male, POOR HISTORIAN, w/ PMH of HTN, DM II, CHF (EF unknown), CAD s/p prior cardiac catheterizations with stents, and COPD (not O2 dependent), who presented to [facility] ED 1/30 c/o worsening SOB x 4 days. Found to be in acute decompensated HF. S/P IV diuresis. Plan for RHC/LHC on 2/2/23.      Review of studies:   1/23: EF 30-35%, Moderately-to-severely reduced left ventricular systolic function.  No significant valvular disease. LV 6.58      #HFrEF   Etiology: likely ischemic   Diuretics: PO lasix 40 daily   GDMT: entresto 24/26 po bid; farxiga 10; not on BB currently    - plan for R+LHC today; plan for toprol 25 after RHC     #CAD  - c/w ASA, plavix, atorvastatin

## 2023-02-02 NOTE — PROGRESS NOTE ADULT - PROBLEM SELECTOR PLAN 5
Stable, -140s  - Home meds: Entresto 24/26 mg BID (noncompliant)  - Continue Entresto 24/26 mg BID, Lasix 40 mg IVP qd  - Starting Toprox XL 12.5mg QD Stable, -140s  - Home meds: Entresto 24/26 mg BID (noncompliant)  - Continue Entresto 24/26 mg BID, Lasix 40 mg PO qd, Toprol XL 12.5 mg qd Stable, -140s  - Home meds: Entresto 24/26 mg BID (noncompliant)  - Continue Entresto 24/26 mg BID, Lasix 40 mg PO qd

## 2023-02-02 NOTE — PROGRESS NOTE ADULT - PROBLEM SELECTOR PLAN 1
On admission, patient w/ SOB and LE edema. Ran out of Lasix at home due to increasing dose  - PE: bibasilar crackles, no JVD, 1+ edema, spO2 in 90s on 3L  - EKG non-ischemic. Troponin T 0.03 x2, BNP 3929. , CKMB 3.8  - ECHO 01/31/23: LVEF 33%, mod-severely reduced LVSF, severely dilated LV  - DIURESIS: S/p Lasix 60 mg IVP x 1 in ED, S/P LASIX 40 mg IV BID, now on LASIX 40 mg daily   - I's and O's: Net neg 2.4 L   - GDMT: Started Entresto 24/26 mg BID ($15 copay); started Farxiga 10 mg daily ($15 copay w/vivo)  - NPO after midnight for R+LHC   - Adv HF consulted, f/u recs   - Core measures, daily weights, strict I&Os, PT eval  - Starting Toprol XL 12.5mg QD today 2/2 On admission, patient w/ SOB and LE edema. Ran out of Lasix at home due to increasing dose  - PE: bibasilar crackles, no JVD, 1+ edema, spO2 in 90s on 3L  - EKG non-ischemic. Troponin T 0.03 x2, BNP 3929. , CKMB 3.8  - ECHO 01/31/23: LVEF 33%, mod-severely reduced LVSF, severely dilated LV  - DIURESIS: S/p Lasix 60 mg IVP x 1 in ED, S/P LASIX 40 mg IV BID, now on LASIX 40 mg daily   - I's and O's: Net neg 4.2 L since admission   - GDMT: Started Entresto 24/26 mg BID ($15 copay); started Farxiga 10 mg daily ($15 copay w/vivo), will start Toprol XL 12.5 mg daily   - Awaiting R+ [facility] 02/02/2023   - Adv HF consulted, f/u recs   - Core measures, daily weights, strict I&Os, PT eval On admission, patient w/ SOB and LE edema. Ran out of Lasix at home due to increasing dose  - PE: bibasilar crackles, no JVD, 1+ edema, spO2 in 90s on 3L  - EKG non-ischemic. Troponin T 0.03 x2, BNP 3929. , CKMB 3.8  - ECHO 01/31/23: LVEF 33%, mod-severely reduced LVSF, severely dilated LV  - DIURESIS: S/p Lasix 60 mg IVP x 1 in ED, S/P LASIX 40 mg IV BID, now on LASIX 40 mg daily   - I's and O's: Net neg 4.2 L since admission   - GDMT: Started Entresto 24/26 mg BID ($15 copay); started Farxiga 10 mg daily ($15 copay w/vivo), will start Toprol XL once RHC results   - Awaiting R+ [facility] 02/02/2023   - Adv HF consulted, f/u recs   - Core measures, daily weights, strict I&Os, PT eval

## 2023-02-02 NOTE — PROGRESS NOTE ADULT - SUBJECTIVE AND OBJECTIVE BOX
OVERNIGHT: No acute events overnight.   SUBJECTIVE: Patient was seen and examined this morning. He reports an episode of SOB this morning. Now resolved with supplemental O2 NC. Planned for R/L cath today.    CAPILLARY BLOOD GLUCOSE & INSULIN RECEIVED  Yesterday  - Dinner FS mg/dL = 18 units of premeal Lispro + 2 units of Lispro sliding scale. Ate chicken, veggies, salad, potato, and pudding.  - Bedtime FS mg/dL = 30 units of Lantus + 2 units Lispro sliding scale.     Today  - Breakfast FS mg/dL = NPO.  - Lunch FSG:  mg/dL =     132 mg/dL ( @ 06:37)  161 mg/dL ( @ 23:25)  182 mg/dL ( @ 21:55)  159 mg/dL ( @ 16:47)  330 mg/dL ( @ 12:40)    REVIEW OF SYSTEMS  Constitutional:  Negative fever, chills or loss of appetite.  Eyes:  Negative blurry vision or double vision.  Cardiovascular:  Negative for chest pain or palpitations.  Respiratory:  Negative for cough, wheezing, or shortness of breath.    Gastrointestinal:  Negative for nausea, vomiting, diarrhea, constipation, or abdominal pain.  Genitourinary:  Negative frequency, urgency or dysuria.  Neurologic:  No headache, confusion, dizziness, lightheadedness.    PHYSICAL EXAM  Vital Signs Last 24 Hrs  T(C): 36.2 (2023 08:36), Max: 36.4 (2023 18:11)  T(F): 97.2 (2023 08:36), Max: 97.6 (2023 18:11)  HR: 98 (2023 08:44) (96 - 106)  BP: 96/58 (2023 08:44) (96/58 - 125/89)  BP(mean): 72 (2023 08:44) (72 - 88)  RR: 18 (2023 08:44) (17 - 19)  SpO2: 98% (2023 08:44) (95% - 99%)    Parameters below as of 2023 08:44  Patient On (Oxygen Delivery Method): nasal cannula  O2 Flow (L/min): 2    Constitutional:  NAD. Obese.  HEENT: NCAT, MMM, OP clear, EOMI, , no proptosis or lid retraction  Neck: + thyromegaly  Respiratory: bibasilar crackles  Cardiovascular: regular rhythm, normal S1 and S2, no audible murmurs, 1+ b/l les edema  GI: soft, NT/ND, no masses/HSM appreciated.  Neurology: no tremors, DTR 2+  Skin: no visible rashes/lesions.   Psychiatric: AAO x 3, normal affect/mood.  Ext: radial pulses intact, DP pulses intact, extremities warm, no cyanosis, clubbing    LABS  CBC - WBC/HGB/HTC/PLT: 12.02/14.7/43.1/343 (23)  BMP - Na/K/Cl/Bicarb/BUN/Cr/Gluc: 135/4.1/99/25/33/1.42/124 (23)  Anion Gap: 11 (23)  eGFR: 56 (23)  Calcium: 9.0 (23)  Phosphorus: -- (23)  Magnesium: 2.1 (23)  LFT - Alb/Tprot/Tbili/Dbili/AlkPhos/ALT/AST: 3.8/--/0.3/--/84/12/11 (23)  PT/aPTT/INR: 11.4/32.6/0.96 (23)   Thyroid Stimulating Hormone, Serum: 0.173 (23)  Total T4/Free T4: --/1.150 (23)      MEDICATIONS  MEDICATIONS  (STANDING):  aspirin  chewable 81 milliGRAM(s) Oral daily  atorvastatin 40 milliGRAM(s) Oral at bedtime  clopidogrel Tablet 75 milliGRAM(s) Oral daily  dextrose 5%. 1000 milliLiter(s) (50 mL/Hr) IV Continuous <Continuous>  dextrose 5%. 1000 milliLiter(s) (100 mL/Hr) IV Continuous <Continuous>  dextrose 50% Injectable 25 Gram(s) IV Push once  dextrose 50% Injectable 12.5 Gram(s) IV Push once  dextrose 50% Injectable 25 Gram(s) IV Push once  enoxaparin Injectable 40 milliGRAM(s) SubCutaneous every 24 hours  furosemide    Tablet 40 milliGRAM(s) Oral daily  glucagon  Injectable 1 milliGRAM(s) IntraMuscular once  influenza   Vaccine 0.5 milliLiter(s) IntraMuscular once  insulin glargine Injectable (LANTUS) 30 Unit(s) SubCutaneous at bedtime  insulin lispro (ADMELOG) corrective regimen sliding scale   SubCutaneous Before meals and at bedtime  insulin lispro Injectable (ADMELOG) 18 Unit(s) SubCutaneous three times a day before meals  sacubitril 24 mG/valsartan 26 mG 1 Tablet(s) Oral two times a day    MEDICATIONS  (PRN):  albuterol/ipratropium for Nebulization 3 milliLiter(s) Nebulizer every 6 hours PRN Shortness of Breath and/or Wheezing  dextrose Oral Gel 15 Gram(s) Oral once PRN Blood Glucose LESS THAN 70 milliGRAM(s)/deciliter   OVERNIGHT: No acute events overnight.   SUBJECTIVE: Patient was seen and examined this morning. He reports an episode of SOB this morning. Now resolved with supplemental O2 NC. Planned for R/L cath today.    CAPILLARY BLOOD GLUCOSE & INSULIN RECEIVED  Yesterday  - Dinner FS mg/dL = 18 units of premeal Lispro + 2 units of Lispro sliding scale. Ate chicken, veggies, salad, potato, and pudding.  - Bedtime FS mg/dL = 30 units of Lantus + 2 units Lispro sliding scale.     Today  - Breakfast FS mg/dL = NPO.  - Lunch FSG:  mg/dL =     132 mg/dL ( @ 06:37)  161 mg/dL ( @ 23:25)  182 mg/dL ( @ 21:55)  159 mg/dL ( @ 16:47)  330 mg/dL ( @ 12:40)    REVIEW OF SYSTEMS  Constitutional:  Negative fever, chills or loss of appetite.  Eyes:  Negative blurry vision or double vision.  Cardiovascular:  Negative for chest pain or palpitations.  Respiratory:  Negative for cough, wheezing, or shortness of breath.    Gastrointestinal:  Negative for nausea, vomiting, diarrhea, constipation, or abdominal pain.  Genitourinary:  Negative frequency, urgency or dysuria.  Neurologic:  No headache, confusion, dizziness, lightheadedness.    PHYSICAL EXAM  Vital Signs Last 24 Hrs  T(C): 36.2 (2023 08:36), Max: 36.4 (2023 18:11)  T(F): 97.2 (2023 08:36), Max: 97.6 (2023 18:11)  HR: 98 (2023 08:44) (96 - 106)  BP: 96/58 (2023 08:44) (96/58 - 125/89)  BP(mean): 72 (2023 08:44) (72 - 88)  RR: 18 (2023 08:44) (17 - 19)  SpO2: 98% (2023 08:44) (95% - 99%)    Parameters below as of 2023 08:44  Patient On (Oxygen Delivery Method): nasal cannula  O2 Flow (L/min): 2    Constitutional:  NAD. Obese.  HEENT: NCAT, MMM, OP clear, EOMI, , no proptosis or lid retraction  Neck: + thyromegaly  Respiratory: bibasilar crackles  Cardiovascular: regular rhythm, normal S1 and S2, no audible murmurs, 1+ b/l les edema  GI: soft, NT/ND, no masses/HSM appreciated.  Neurology: no tremors, DTR 2+  Skin: no visible rashes/lesions.   Psychiatric: AAO x 3, normal affect/mood.  Ext: radial pulses intact, DP pulses intact, extremities warm, no cyanosis, clubbing    LABS  CBC - WBC/HGB/HTC/PLT: 12.02/14.7/43.1/343 (23)  BMP - Na/K/Cl/Bicarb/BUN/Cr/Gluc: 135/4.1/99/25/33/1.42/124 (23)  Anion Gap: 11 (23)  eGFR: 56 (23)  Calcium: 9.0 (23)  Phosphorus: -- (23)  Magnesium: 2.1 (23)  LFT - Alb/Tprot/Tbili/Dbili/AlkPhos/ALT/AST: 3.8/--/0.3/--/84/12/11 (23)  PT/aPTT/INR: 11.4/32.6/0.96 (23)   Thyroid Stimulating Hormone, Serum: 0.173 (23)  Total T4/Free T4: --/1.150 (23)      MEDICATIONS  MEDICATIONS  (STANDING):  aspirin  chewable 81 milliGRAM(s) Oral daily  atorvastatin 40 milliGRAM(s) Oral at bedtime  clopidogrel Tablet 75 milliGRAM(s) Oral daily  dextrose 5%. 1000 milliLiter(s) (50 mL/Hr) IV Continuous <Continuous>  dextrose 5%. 1000 milliLiter(s) (100 mL/Hr) IV Continuous <Continuous>  dextrose 50% Injectable 25 Gram(s) IV Push once  dextrose 50% Injectable 12.5 Gram(s) IV Push once  dextrose 50% Injectable 25 Gram(s) IV Push once  enoxaparin Injectable 40 milliGRAM(s) SubCutaneous every 24 hours  furosemide    Tablet 40 milliGRAM(s) Oral daily  glucagon  Injectable 1 milliGRAM(s) IntraMuscular once  influenza   Vaccine 0.5 milliLiter(s) IntraMuscular once  insulin glargine Injectable (LANTUS) 30 Unit(s) SubCutaneous at bedtime  insulin lispro (ADMELOG) corrective regimen sliding scale   SubCutaneous Before meals and at bedtime  insulin lispro Injectable (ADMELOG) 18 Unit(s) SubCutaneous three times a day before meals  sacubitril 24 mG/valsartan 26 mG 1 Tablet(s) Oral two times a day    MEDICATIONS  (PRN):  albuterol/ipratropium for Nebulization 3 milliLiter(s) Nebulizer every 6 hours PRN Shortness of Breath and/or Wheezing  dextrose Oral Gel 15 Gram(s) Oral once PRN Blood Glucose LESS THAN 70 milliGRAM(s)/deciliter    INTERPRETATION:  THYROID ULTRASOUND with Doppler dated 2023 9:04 PM    History: low TSH    Technique: Ultrasound evaluation of the thyroid was performed in the   axial and sagittal projections. Color Doppler evaluation was also   performed.    Prior study: None.    Findings:    RIGHT LOBE:  Dimensions: 7.5 x 2.8 x 3.2 cm (sagittal x AP x transverse)  Echotexture: Homogenous  Vascularity: Normal  Multiple nodules seen, most suspicious discrete nodule as follows    Location: Right midpole, posterior  Dimensions: 1.7 x 0.9 x 1.3 cm (sagittal x AP x transverse)  Composition: Solid  For solid nodules or for the solid portion of a partially cystic nodule,   does the solid portion have any of the following suspicious features?  -  Yes: Hypoechoic  -  No: Microcalcifications  -  No: Irregular margin (infiltrative or microlobulated)  -  No: Taller than wide (AP dimension > transverse)  -  No: Extrathyroidal extension  -  No: Interrupted rim calcification with soft tissue extrusion    LEFT LOBE:  Dimensions: 8.2 x 3.7 x 4.0 cm (sagittal x AP x transverse)  Echotexture: Homogenous  Vascularity: Normal  Multiple nodules seen, most suspicious discrete nodules as follows:    Location: Left lower pole  Dimensions: 1.9 x 3.2 x 2.8 cm (sagittal x AP x transverse)  Composition: Solid  For solid nodules or for the solid portion of a partially cystic nodule,   does the solid portion have any of the following suspicious features?  -  No: Hypoechoic  -  No: Microcalcifications  -  No: Irregular margin (infiltrative or microlobulated)  -  Yes: Taller than wide (AP dimension > transverse)  -  No: Extrathyroidal extension  -  No: Interrupted rim calcification with soft tissue extrusion    Location: Left mid pole  Dimensions: 2.7 x 1.9 x 2.3 cm (sagittal x AP x transverse)  Composition: Solid  For solid nodules or for the solid portion of a partially cystic nodule,   does the solid portion have any of the following suspicious features?  -  No: Hypoechoic  -  No: Microcalcifications  -  No: Irregular margin (infiltrative or microlobulated)  -  No: Taller than wide (AP dimension > transverse)  -  No: Extrathyroidal extension  -  No: Interrupted rim calcification with soft tissue extrusion      ISTHMUS:  Dimensions: 0.3 cm AP  The right isthmus contains a benign colloid cyst 1.0 x 1.3 x 1.1 cm.    CERVICAL LYMPH NODE EVALUATION:  -  No abnormal lymph nodes are identified in the neck.    PARATHYROID EXAMINATION:  -  Parathyroid glands not visualized.      IMPRESSION:  Enlarged multinodular thyroid gland. The 3 thyroid nodules detailed above   meet criteria for fine-needle aspiration biopsy.    ----------------------------------------------------------------  RECOMMENDATIONS ARE BASED UPON THE 2015 [employer]ICAN [employer]YRO[employer]OCIATION   MANAGEMENT GUIDELINES. Thresholds for recommended fine needle aspiration   are as follows:  *  Solid nodule with one or more suspicious sonographic features greater   than or equal to 1.0-cm  *  Solid nodule without suspicious sonographic features greater than or   equal to 1.5-cm  *  Partially cystic nodule in which the solid component has one or more   suspicious sonographic features greater than or equal to 1.0-cm  *  Partially cystic nodule with eccentric solid area(s) without   suspicious sonographic features greater than or equal to 1.5-cm  *  Partially cystic nodule without suspicious sonographic features   greater than or equal to 2.0-cm  *  Spongiform nodule greater than or equal to 2.0-cm    --- End of Report ---          JANELL PUENTE MD; Resident Radiologist  This document has been electronically signed.  BRUCE SPARKS MD; Attending Radiologist  This document has been electronically signed. 2023 10:11AM     OVERNIGHT: No acute events overnight.   SUBJECTIVE: Patient was seen and examined this morning. He reports an episode of SOB this morning. Now resolved with supplemental O2 NC. Planned for R/L cath today.    CAPILLARY BLOOD GLUCOSE & INSULIN RECEIVED  Yesterday  - Dinner FS mg/dL = 18 units of premeal Lispro + 2 units of Lispro sliding scale. Ate chicken, veggies, salad, potato, and pudding.  - Bedtime FS mg/dL = 30 units of Lantus + 2 units Lispro sliding scale.     Today  - Breakfast FS mg/dL = NPO.  - Lunch FS mg/dL =     132 mg/dL ( @ 06:37)  161 mg/dL ( @ 23:25)  182 mg/dL ( @ 21:55)  159 mg/dL ( @ 16:47)  330 mg/dL ( @ 12:40)    REVIEW OF SYSTEMS  Constitutional:  Negative fever, chills or loss of appetite.  Eyes:  Negative blurry vision or double vision.  Cardiovascular:  Negative for chest pain or palpitations.  Respiratory:  Negative for cough, wheezing, or shortness of breath.    Gastrointestinal:  Negative for nausea, vomiting, diarrhea, constipation, or abdominal pain.  Genitourinary:  Negative frequency, urgency or dysuria.  Neurologic:  No headache, confusion, dizziness, lightheadedness.    PHYSICAL EXAM  Vital Signs Last 24 Hrs  T(C): 36.2 (2023 08:36), Max: 36.4 (2023 18:11)  T(F): 97.2 (2023 08:36), Max: 97.6 (2023 18:11)  HR: 98 (2023 08:44) (96 - 106)  BP: 96/58 (2023 08:44) (96/58 - 125/89)  BP(mean): 72 (2023 08:44) (72 - 88)  RR: 18 (2023 08:44) (17 - 19)  SpO2: 98% (2023 08:44) (95% - 99%)    Parameters below as of 2023 08:44  Patient On (Oxygen Delivery Method): nasal cannula  O2 Flow (L/min): 2    Constitutional:  NAD. Obese.  HEENT: NCAT, MMM, OP clear, EOMI, , no proptosis or lid retraction  Neck: + thyromegaly  Respiratory: bibasilar crackles  Cardiovascular: regular rhythm, normal S1 and S2, no audible murmurs, 1+ b/l les edema  GI: soft, NT/ND, no masses/HSM appreciated.  Neurology: no tremors, DTR 2+  Skin: no visible rashes/lesions.   Psychiatric: AAO x 3, normal affect/mood.  Ext: radial pulses intact, DP pulses intact, extremities warm, no cyanosis, clubbing    LABS  CBC - WBC/HGB/HTC/PLT: 12.02/14.7/43.1/343 (23)  BMP - Na/K/Cl/Bicarb/BUN/Cr/Gluc: 135/4.1/99/25/33/1.42/124 (23)  Anion Gap: 11 (23)  eGFR: 56 (23)  Calcium: 9.0 (23)  Phosphorus: -- (23)  Magnesium: 2.1 (23)  LFT - Alb/Tprot/Tbili/Dbili/AlkPhos/ALT/AST: 3.8/--/0.3/--/84/12/11 (23)  PT/aPTT/INR: 11.4/32.6/0.96 (23)   Thyroid Stimulating Hormone, Serum: 0.173 (23)  Total T4/Free T4: --/1.150 (23)      MEDICATIONS  MEDICATIONS  (STANDING):  aspirin  chewable 81 milliGRAM(s) Oral daily  atorvastatin 40 milliGRAM(s) Oral at bedtime  clopidogrel Tablet 75 milliGRAM(s) Oral daily  dextrose 5%. 1000 milliLiter(s) (50 mL/Hr) IV Continuous <Continuous>  dextrose 5%. 1000 milliLiter(s) (100 mL/Hr) IV Continuous <Continuous>  dextrose 50% Injectable 25 Gram(s) IV Push once  dextrose 50% Injectable 12.5 Gram(s) IV Push once  dextrose 50% Injectable 25 Gram(s) IV Push once  enoxaparin Injectable 40 milliGRAM(s) SubCutaneous every 24 hours  furosemide    Tablet 40 milliGRAM(s) Oral daily  glucagon  Injectable 1 milliGRAM(s) IntraMuscular once  influenza   Vaccine 0.5 milliLiter(s) IntraMuscular once  insulin glargine Injectable (LANTUS) 30 Unit(s) SubCutaneous at bedtime  insulin lispro (ADMELOG) corrective regimen sliding scale   SubCutaneous Before meals and at bedtime  insulin lispro Injectable (ADMELOG) 18 Unit(s) SubCutaneous three times a day before meals  sacubitril 24 mG/valsartan 26 mG 1 Tablet(s) Oral two times a day    MEDICATIONS  (PRN):  albuterol/ipratropium for Nebulization 3 milliLiter(s) Nebulizer every 6 hours PRN Shortness of Breath and/or Wheezing  dextrose Oral Gel 15 Gram(s) Oral once PRN Blood Glucose LESS THAN 70 milliGRAM(s)/deciliter    INTERPRETATION:  THYROID ULTRASOUND with Doppler dated 2023 9:04 PM    History: low TSH    Technique: Ultrasound evaluation of the thyroid was performed in the   axial and sagittal projections. Color Doppler evaluation was also   performed.    Prior study: None.    Findings:    RIGHT LOBE:  Dimensions: 7.5 x 2.8 x 3.2 cm (sagittal x AP x transverse)  Echotexture: Homogenous  Vascularity: Normal  Multiple nodules seen, most suspicious discrete nodule as follows    Location: Right midpole, posterior  Dimensions: 1.7 x 0.9 x 1.3 cm (sagittal x AP x transverse)  Composition: Solid  For solid nodules or for the solid portion of a partially cystic nodule,   does the solid portion have any of the following suspicious features?  -  Yes: Hypoechoic  -  No: Microcalcifications  -  No: Irregular margin (infiltrative or microlobulated)  -  No: Taller than wide (AP dimension > transverse)  -  No: Extrathyroidal extension  -  No: Interrupted rim calcification with soft tissue extrusion    LEFT LOBE:  Dimensions: 8.2 x 3.7 x 4.0 cm (sagittal x AP x transverse)  Echotexture: Homogenous  Vascularity: Normal  Multiple nodules seen, most suspicious discrete nodules as follows:    Location: Left lower pole  Dimensions: 1.9 x 3.2 x 2.8 cm (sagittal x AP x transverse)  Composition: Solid  For solid nodules or for the solid portion of a partially cystic nodule,   does the solid portion have any of the following suspicious features?  -  No: Hypoechoic  -  No: Microcalcifications  -  No: Irregular margin (infiltrative or microlobulated)  -  Yes: Taller than wide (AP dimension > transverse)  -  No: Extrathyroidal extension  -  No: Interrupted rim calcification with soft tissue extrusion    Location: Left mid pole  Dimensions: 2.7 x 1.9 x 2.3 cm (sagittal x AP x transverse)  Composition: Solid  For solid nodules or for the solid portion of a partially cystic nodule,   does the solid portion have any of the following suspicious features?  -  No: Hypoechoic  -  No: Microcalcifications  -  No: Irregular margin (infiltrative or microlobulated)  -  No: Taller than wide (AP dimension > transverse)  -  No: Extrathyroidal extension  -  No: Interrupted rim calcification with soft tissue extrusion      ISTHMUS:  Dimensions: 0.3 cm AP  The right isthmus contains a benign colloid cyst 1.0 x 1.3 x 1.1 cm.    CERVICAL LYMPH NODE EVALUATION:  -  No abnormal lymph nodes are identified in the neck.    PARATHYROID EXAMINATION:  -  Parathyroid glands not visualized.      IMPRESSION:  Enlarged multinodular thyroid gland. The 3 thyroid nodules detailed above   meet criteria for fine-needle aspiration biopsy.    ----------------------------------------------------------------  RECOMMENDATIONS ARE BASED UPON THE 2015 [employer]ICAN [employer]YRO[employer] ASSOCIATION   MANAGEMENT GUIDELINES. Thresholds for recommended fine needle aspiration   are as follows:  *  Solid nodule with one or more suspicious sonographic features greater   than or equal to 1.0-cm  *  Solid nodule without suspicious sonographic features greater than or   equal to 1.5-cm  *  Partially cystic nodule in which the solid component has one or more   suspicious sonographic features greater than or equal to 1.0-cm  *  Partially cystic nodule with eccentric solid area(s) without   suspicious sonographic features greater than or equal to 1.5-cm  *  Partially cystic nodule without suspicious sonographic features   greater than or equal to 2.0-cm  *  Spongiform nodule greater than or equal to 2.0-cm    --- End of Report ---          JANELL PUENTE MD; Resident Radiologist  This document has been electronically signed.  BRUCE SPARKS MD; Attending Radiologist  This document has been electronically signed. 2023 10:11AM

## 2023-02-02 NOTE — CONSULT NOTE ADULT - SUBJECTIVE AND OBJECTIVE BOX
HPI:  61 yo male, POOR HISTORIAN, w/ PMH of HTN, DM II, CHF (EF unknown), CAD s/p prior cardiac catheterizations with stents, and COPD (not O2 dependent), who presented to [facility] ED 1/30 c/o worsening SOB x 4 days. Found to be in acute decompensated HF. S/P IV diuresis. Plan for RHC/LHC on 2/2/23.      PAST MEDICAL & SURGICAL HISTORY:  HTN (hypertension)      DM (diabetes mellitus)      CAD (coronary artery disease)      Acute systolic congestive heart failure      S/P cardiac cath      S/P ICD (internal cardiac defibrillator) procedure      PREVIOUS DIAGNOSTIC TESTING:        FAMILY HISTORY:      Social History:      ALLERGIES/INTOLERANCES:  No Known Allergies    HOME MEDICATIONS:    INPATIENT MEDICATIONS:  furosemide    Tablet 40 milliGRAM(s) Oral daily  sacubitril 24 mG/valsartan 26 mG 1 Tablet(s) Oral two times a day    aspirin  chewable 81 milliGRAM(s) Oral daily  clopidogrel Tablet 75 milliGRAM(s) Oral daily  enoxaparin Injectable 40 milliGRAM(s) SubCutaneous every 24 hours    albuterol/ipratropium for Nebulization 3 milliLiter(s) Nebulizer every 6 hours PRN  atorvastatin 40 milliGRAM(s) Oral at bedtime  dextrose 5%. 1000 milliLiter(s) IV Continuous <Continuous>  dextrose 5%. 1000 milliLiter(s) IV Continuous <Continuous>  dextrose 50% Injectable 25 Gram(s) IV Push once  dextrose 50% Injectable 12.5 Gram(s) IV Push once  dextrose 50% Injectable 25 Gram(s) IV Push once  dextrose Oral Gel 15 Gram(s) Oral once PRN  glucagon  Injectable 1 milliGRAM(s) IntraMuscular once  influenza   Vaccine 0.5 milliLiter(s) IntraMuscular once  insulin glargine Injectable (LANTUS) 30 Unit(s) SubCutaneous at bedtime  insulin lispro (ADMELOG) corrective regimen sliding scale   SubCutaneous Before meals and at bedtime  insulin lispro Injectable (ADMELOG) 18 Unit(s) SubCutaneous three times a day before meals      REVIEW OF SYSTEMS: See HPI     PHYSICAL EXAM:  Gen: NAD   HEENT: EOMI   Neck: Flat JVP   Cor: Normal s1, s2. RRR.   Abd: soft, non-tender, non-distended  Ext: no edema  Neuro: alert and attentive    T(C): 36.2 (02-02-23 @ 08:36), Max: 36.4 (02-01-23 @ 18:11)  HR: 98 (02-02-23 @ 08:44) (96 - 106)  BP: 96/58 (02-02-23 @ 08:44) (96/58 - 125/89)  RR: 18 (02-02-23 @ 08:44) (17 - 19)  SpO2: 98% (02-02-23 @ 08:44) (95% - 99%)  Wt(kg): --    I&O's Summary    01 Feb 2023 07:01  -  02 Feb 2023 07:00  --------------------------------------------------------  IN: 370 mL / OUT: 2550 mL / NET: -2180 mL    02 Feb 2023 07:01  -  02 Feb 2023 10:36  --------------------------------------------------------  IN: 0 mL / OUT: 200 mL / NET: -200 mL      	  LABS:                        14.7   12.02 )-----------( 343      ( 02 Feb 2023 05:30 )             43.1     02-02    135  |  99  |  33<H>  ----------------------------<  124<H>  4.1   |  25  |  1.42<H>    Ca    9.0      02 Feb 2023 05:30  Mg     2.1     02-02    TPro  6.7  /  Alb  3.8  /  TBili  0.3  /  DBili  x   /  AST  11  /  ALT  12  /  AlkPhos  84  02-02      Lipid Profile:   HgA1c:   TSH:     CARDIAC MARKERS:          proBNP: Serum Pro-Brain Natriuretic Peptide: 3929 pg/mL (01-30 @ 23:32)      RADIOLOGY:

## 2023-02-02 NOTE — PROGRESS NOTE ADULT - PROBLEM SELECTOR PLAN 6
a1c: 10.1; home meds: Victoza 1.8 under subcut in Am, Tresiba 20-26 units nightly  - Endo consulted - rec Lantus 40 units nightly, lispro 18 units TID with meals  - Will start Farixga for CHF on 2/2/2023   - Continue MISS - 2/1: Lantus decreased to 30 units as pt NPO a1c: 10.1; home meds: Victoza 1.8 under subcut in Am, Tresiba 20-26 units nightly  - Endo consulted - rec Lantus 40 units nightly, lispro 18 units TID with meals, Farxiga 10 mg daily   - Continue MISS  - 2/1: Lantus decreased to 30 units as pt NPO

## 2023-02-02 NOTE — PROGRESS NOTE ADULT - PROBLEM SELECTOR PLAN 8
TSH 0.173 and total t4 7.88  - Endo consulted -  TSH artificially low due to solumedrol 125mg given the night before labs drawn.  - Will repeat TFTs in a few days.  - thyroid US ordered    F: none   E: K >4, Mg > 2  N: DASH/TLC, fluid restriction, consistent carb  DVT: holding prior to cath   GI: Pantoprazole   Dispo: pending clinical progression     Case discussed with Dr. Wilson
TSH 0.173 and total t4 7.88  - Endo consulted -  TSH artificially low due to solumedrol 125mg given the night before labs drawn.  - Will repeat TFTs in a few days.  - thyroid US ordered    F: none   E: K >4, Mg > 2  N: DASH/TLC, fluid restriction, consistent carb  DVT: holding prior to cath   GI: Pantoprazole   Dispo: pending clinical progression     Case discussed with Dr. Wilson

## 2023-02-02 NOTE — PROGRESS NOTE ADULT - SUBJECTIVE AND OBJECTIVE BOX
Interventional Cardiology PA Adult Progress Note    C.C.:     Subjective Assessment:      ROS Negative except as per Subjective and HPI  	  MEDICATIONS:  furosemide    Tablet 40 milliGRAM(s) Oral daily  sacubitril 24 mG/valsartan 26 mG 1 Tablet(s) Oral two times a day      albuterol/ipratropium for Nebulization 3 milliLiter(s) Nebulizer every 6 hours PRN        atorvastatin 40 milliGRAM(s) Oral at bedtime  dextrose 50% Injectable 25 Gram(s) IV Push once  dextrose 50% Injectable 12.5 Gram(s) IV Push once  dextrose 50% Injectable 25 Gram(s) IV Push once  dextrose Oral Gel 15 Gram(s) Oral once PRN  glucagon  Injectable 1 milliGRAM(s) IntraMuscular once  insulin glargine Injectable (LANTUS) 30 Unit(s) SubCutaneous at bedtime  insulin lispro (ADMELOG) corrective regimen sliding scale   SubCutaneous Before meals and at bedtime  insulin lispro Injectable (ADMELOG) 18 Unit(s) SubCutaneous three times a day before meals    aspirin  chewable 81 milliGRAM(s) Oral daily  clopidogrel Tablet 75 milliGRAM(s) Oral daily  dextrose 5%. 1000 milliLiter(s) IV Continuous <Continuous>  dextrose 5%. 1000 milliLiter(s) IV Continuous <Continuous>  enoxaparin Injectable 40 milliGRAM(s) SubCutaneous every 24 hours  influenza   Vaccine 0.5 milliLiter(s) IntraMuscular once      	    [PHYSICAL EXAM:  TELEMETRY:  T(C): 36.2 (02-02-23 @ 08:36), Max: 36.4 (02-01-23 @ 18:11)  HR: 98 (02-02-23 @ 08:44) (96 - 106)  BP: 96/58 (02-02-23 @ 08:44) (96/58 - 125/89)  RR: 18 (02-02-23 @ 08:44) (17 - 19)  SpO2: 98% (02-02-23 @ 08:44) (95% - 99%)  Wt(kg): --  I&O's Summary    01 Feb 2023 07:01  -  02 Feb 2023 07:00  --------------------------------------------------------  IN: 370 mL / OUT: 2550 mL / NET: -2180 mL    02 Feb 2023 07:01  -  02 Feb 2023 09:47  --------------------------------------------------------  IN: 0 mL / OUT: 200 mL / NET: -200 mL        Pritchett:  Central/PICC/Mid Line:                                         Appearance: Normal	  HEENT:   Normal oral mucosa, PERRL, EOMI	  Neck: Supple, + JVD/ - JVD; Carotid Bruit   Cardiovascular: Normal S1 S2, No JVD, No murmurs,   Respiratory: Lungs clear to auscultation/Decreased Breath Sounds/No Rales, Rhonchi, Wheezing	  Gastrointestinal:  Soft, Non-tender, + BS	  Skin: No rashes, No ecchymoses, No cyanosis  Extremities: Normal range of motion, No clubbing, cyanosis or edema  Vascular: Peripheral pulses palpable 2+ bilaterally  Neurologic: Non-focal  Psychiatry: A & O x 3, Mood & affect appropriate      	    ECG:  	  RADIOLOGY:   DIAGNOSTIC TESTING:  [ ] Echocardiogram:  [ ]  Catheterization:  [ ] Stress Test:    [ ] JOSSY  OTHER: 	    LABS:	 	  CARDIAC MARKERS:                                  14.7   12.02 )-----------( 343      ( 02 Feb 2023 05:30 )             43.1     02-02    135  |  99  |  33<H>  ----------------------------<  124<H>  4.1   |  25  |  1.42<H>    Ca    9.0      02 Feb 2023 05:30  Mg     2.1     02-02    TPro  6.7  /  Alb  3.8  /  TBili  0.3  /  DBili  x   /  AST  11  /  ALT  12  /  AlkPhos  84  02-02    proBNP:   Lipid Profile:   HgA1c:   TSH:   PT/INR - ( 02 Feb 2023 05:30 )   PT: 11.4 sec;   INR: 0.96          PTT - ( 02 Feb 2023 05:30 )  PTT:32.6 sec    ASSESSMENT/PLAN: 	        DVT ppx:  Dispo:     Interventional Cardiology PA Adult Progress Note    C.C.: CHF exacerbation     Subjective Assessment: Pt seen and examined today at bedside. Pt is still experiencing orthopnea overnight and reports he had to sleep in his chair. Otherwise feels well. Denies chest pain, dizziness, palpitations, cough.   ROS Negative except as per Subjective and HPI  	  MEDICATIONS:  furosemide    Tablet 40 milliGRAM(s) Oral daily  sacubitril 24 mG/valsartan 26 mG 1 Tablet(s) Oral two times a day  albuterol/ipratropium for Nebulization 3 milliLiter(s) Nebulizer every 6 hours PRN  atorvastatin 40 milliGRAM(s) Oral at bedtime  dextrose 50% Injectable 25 Gram(s) IV Push once  dextrose 50% Injectable 12.5 Gram(s) IV Push once  dextrose 50% Injectable 25 Gram(s) IV Push once  dextrose Oral Gel 15 Gram(s) Oral once PRN  glucagon  Injectable 1 milliGRAM(s) IntraMuscular once  insulin glargine Injectable (LANTUS) 30 Unit(s) SubCutaneous at bedtime  insulin lispro (ADMELOG) corrective regimen sliding scale   SubCutaneous Before meals and at bedtime  insulin lispro Injectable (ADMELOG) 18 Unit(s) SubCutaneous three times a day before meals  aspirin  chewable 81 milliGRAM(s) Oral daily  clopidogrel Tablet 75 milliGRAM(s) Oral daily  dextrose 5%. 1000 milliLiter(s) IV Continuous <Continuous>  dextrose 5%. 1000 milliLiter(s) IV Continuous <Continuous>  enoxaparin Injectable 40 milliGRAM(s) SubCutaneous every 24 hours  influenza   Vaccine 0.5 milliLiter(s) IntraMuscular once      	    [PHYSICAL EXAM:  TELEMETRY:  T(C): 36.2 (02-02-23 @ 08:36), Max: 36.4 (02-01-23 @ 18:11)  HR: 98 (02-02-23 @ 08:44) (96 - 106)  BP: 96/58 (02-02-23 @ 08:44) (96/58 - 125/89)  RR: 18 (02-02-23 @ 08:44) (17 - 19)  SpO2: 98% (02-02-23 @ 08:44) (95% - 99%)  Wt(kg): --  I&O's Summary    01 Feb 2023 07:01  -  02 Feb 2023 07:00  --------------------------------------------------------  IN: 370 mL / OUT: 2550 mL / NET: -2180 mL    02 Feb 2023 07:01  -  02 Feb 2023 09:47  --------------------------------------------------------  IN: 0 mL / OUT: 200 mL / NET: -200 mL        Pritchett:  Central/PICC/Mid Line:                                         Appearance: Normal	  HEENT:   Normal oral mucosa, PERRL, EOMI	  Neck: Supple, - JVD; No Carotid Bruit   Cardiovascular: Normal S1 S2, No JVD, No murmurs,   Respiratory: Rales noted in bilateral lung bases 	  Gastrointestinal:  Soft, Non-tender, + BS	  Skin: No rashes, No ecchymoses, No cyanosis  Extremities: Normal range of motion, No clubbing, cyanosis or edema  Vascular: Peripheral pulses palpable 2+ bilaterally  Neurologic: Non-focal  Psychiatry: A & O x 3, Mood & affect appropriate      	    ECG:  	  RADIOLOGY:   DIAGNOSTIC TESTING:  [ ] Echocardiogram:  [ ]  Catheterization:  [ ] Stress Test:    [ ] JOSSY  OTHER: 	    LABS:	 	  CARDIAC MARKERS:                                  14.7   12.02 )-----------( 343      ( 02 Feb 2023 05:30 )             43.1     02-02    135  |  99  |  33<H>  ----------------------------<  124<H>  4.1   |  25  |  1.42<H>    Ca    9.0      02 Feb 2023 05:30  Mg     2.1     02-02    TPro  6.7  /  Alb  3.8  /  TBili  0.3  /  DBili  x   /  AST  11  /  ALT  12  /  AlkPhos  84  02-02    proBNP:   Lipid Profile:   HgA1c:   TSH:   PT/INR - ( 02 Feb 2023 05:30 )   PT: 11.4 sec;   INR: 0.96          PTT - ( 02 Feb 2023 05:30 )  PTT:32.6 sec    ASSESSMENT/PLAN: 	        DVT ppx:  Dispo:     Interventional Cardiology PA Adult Progress Note    C.C.: CHF exacerbation     Subjective Assessment: Pt seen and examined today at bedside. Pt is still experiencing orthopnea overnight and reports he had to sleep in his chair. Otherwise feels well. Denies chest pain, dizziness, palpitations, cough.     ROS Negative except as per Subjective and HPI  	  MEDICATIONS:  furosemide    Tablet 40 milliGRAM(s) Oral daily  sacubitril 24 mG/valsartan 26 mG 1 Tablet(s) Oral two times a day  albuterol/ipratropium for Nebulization 3 milliLiter(s) Nebulizer every 6 hours PRN  atorvastatin 40 milliGRAM(s) Oral at bedtime  dextrose 50% Injectable 25 Gram(s) IV Push once  dextrose 50% Injectable 12.5 Gram(s) IV Push once  dextrose 50% Injectable 25 Gram(s) IV Push once  dextrose Oral Gel 15 Gram(s) Oral once PRN  glucagon  Injectable 1 milliGRAM(s) IntraMuscular once  insulin glargine Injectable (LANTUS) 30 Unit(s) SubCutaneous at bedtime  insulin lispro (ADMELOG) corrective regimen sliding scale   SubCutaneous Before meals and at bedtime  insulin lispro Injectable (ADMELOG) 18 Unit(s) SubCutaneous three times a day before meals  aspirin  chewable 81 milliGRAM(s) Oral daily  clopidogrel Tablet 75 milliGRAM(s) Oral daily  dextrose 5%. 1000 milliLiter(s) IV Continuous <Continuous>  dextrose 5%. 1000 milliLiter(s) IV Continuous <Continuous>  enoxaparin Injectable 40 milliGRAM(s) SubCutaneous every 24 hours  influenza   Vaccine 0.5 milliLiter(s) IntraMuscular once    [PHYSICAL EXAM:  TELEMETRY:  T(C): 36.2 (02-02-23 @ 08:36), Max: 36.4 (02-01-23 @ 18:11)  HR: 98 (02-02-23 @ 08:44) (96 - 106)  BP: 96/58 (02-02-23 @ 08:44) (96/58 - 125/89)  RR: 18 (02-02-23 @ 08:44) (17 - 19)  SpO2: 98% (02-02-23 @ 08:44) (95% - 99%)  Wt(kg): --  I&O's Summary    01 Feb 2023 07:01  -  02 Feb 2023 07:00  --------------------------------------------------------  IN: 370 mL / OUT: 2550 mL / NET: -2180 mL    02 Feb 2023 07:01  -  02 Feb 2023 09:47  --------------------------------------------------------  IN: 0 mL / OUT: 200 mL / NET: -200 mL    Appearance: Normal	  HEENT:   Normal oral mucosa, PERRL, EOMI	  Neck: Supple, - JVD; No Carotid Bruit   Cardiovascular: Normal S1 S2, No JVD, No murmurs,   Respiratory: Rales noted in bilateral lung bases 	  Gastrointestinal:  Soft, Non-tender, + BS	  Skin: No rashes, No ecchymoses, No cyanosis  Extremities: Normal range of motion, No clubbing, cyanosis or edema  Vascular: Peripheral pulses palpable 2+ bilaterally  Neurologic: Non-focal  Psychiatry: A & O x 3, Mood & affect appropriate    	    DIAGNOSTIC TESTING:  [x ] Echocardiogram 01/31/23:     1. Severely dilated left ventricular size.   2. Moderately-to-severely reduced left ventricular systolic function. EF: 33%   3. Normal right ventricular size and systolic function.   4. No significant valvular disease.   5. No pericardial effusion.                          14.7   12.02 )-----------( 343      ( 02 Feb 2023 05:30 )             43.1     02-02    135  |  99  |  33<H>  ----------------------------<  124<H>  4.1   |  25  |  1.42<H>    Ca    9.0      02 Feb 2023 05:30  Mg     2.1     02-02    TPro  6.7  /  Alb  3.8  /  TBili  0.3  /  DBili  x   /  AST  11  /  ALT  12  /  AlkPhos  84  02-02  proBNP: 3929  Lipid Profile: total cholesterol: 229, LDL: 177, HDL: 35  HgA1c: 10.1  TSH: 0.173  PT/INR - ( 02 Feb 2023 05:30 )   PT: 11.4 sec;   INR: 0.96     PTT - ( 02 Feb 2023 05:30 )  PTT:32.6 sec

## 2023-02-02 NOTE — PROGRESS NOTE ADULT - PROBLEM SELECTOR PLAN 2
Admits to productive cough. WBC 16.19 on admission, since downtrended   - CXR 1/30/23: bibasilar opacities/pleural effusions, cardiomegaly, thoracic aortic calcification, satisfactory position left ICD, thoracic spine degenerative changes, L coracoclavicular joint variant  - BCx x2 testing. UA normal. Lactate 1.3  - Afebrile, monitor off abx #RESOLVING  Admits to productive cough. WBC 16.19 on admission, since downtrended   - CXR 1/30/23: bibasilar opacities/pleural effusions, cardiomegaly, thoracic aortic calcification, satisfactory position left ICD, thoracic spine degenerative changes, L coracoclavicular joint variant  - BCx x2 testing. UA normal. Lactate 1.3  - Afebrile, monitor off abx

## 2023-02-03 ENCOUNTER — TRANSCRIPTION ENCOUNTER (OUTPATIENT)
Age: 63
End: 2023-02-03

## 2023-02-03 VITALS
HEART RATE: 106 BPM | SYSTOLIC BLOOD PRESSURE: 135 MMHG | DIASTOLIC BLOOD PRESSURE: 77 MMHG | OXYGEN SATURATION: 98 % | RESPIRATION RATE: 18 BRPM

## 2023-02-03 PROBLEM — I10 ESSENTIAL (PRIMARY) HYPERTENSION: Chronic | Status: ACTIVE | Noted: 2023-01-31

## 2023-02-03 PROBLEM — E11.9 TYPE 2 DIABETES MELLITUS WITHOUT COMPLICATIONS: Chronic | Status: ACTIVE | Noted: 2023-01-31

## 2023-02-03 PROBLEM — I25.10 ATHEROSCLEROTIC HEART DISEASE OF NATIVE CORONARY ARTERY WITHOUT ANGINA PECTORIS: Chronic | Status: ACTIVE | Noted: 2023-01-31

## 2023-02-03 LAB
ALBUMIN SERPL ELPH-MCNC: 3.7 G/DL — SIGNIFICANT CHANGE UP (ref 3.3–5)
ALP SERPL-CCNC: 85 U/L — SIGNIFICANT CHANGE UP (ref 40–120)
ALT FLD-CCNC: 12 U/L — SIGNIFICANT CHANGE UP (ref 10–45)
ANION GAP SERPL CALC-SCNC: 7 MMOL/L — SIGNIFICANT CHANGE UP (ref 5–17)
AST SERPL-CCNC: 10 U/L — SIGNIFICANT CHANGE UP (ref 10–40)
BASOPHILS # BLD AUTO: 0.08 K/UL — SIGNIFICANT CHANGE UP (ref 0–0.2)
BASOPHILS NFR BLD AUTO: 0.7 % — SIGNIFICANT CHANGE UP (ref 0–2)
BILIRUB SERPL-MCNC: 0.6 MG/DL — SIGNIFICANT CHANGE UP (ref 0.2–1.2)
BUN SERPL-MCNC: 31 MG/DL — HIGH (ref 7–23)
CALCIUM SERPL-MCNC: 8.6 MG/DL — SIGNIFICANT CHANGE UP (ref 8.4–10.5)
CHLORIDE SERPL-SCNC: 105 MMOL/L — SIGNIFICANT CHANGE UP (ref 96–108)
CO2 SERPL-SCNC: 28 MMOL/L — SIGNIFICANT CHANGE UP (ref 22–31)
CREAT SERPL-MCNC: 1.45 MG/DL — HIGH (ref 0.5–1.3)
EGFR: 55 ML/MIN/1.73M2 — LOW
EOSINOPHIL # BLD AUTO: 0.12 K/UL — SIGNIFICANT CHANGE UP (ref 0–0.5)
EOSINOPHIL NFR BLD AUTO: 1 % — SIGNIFICANT CHANGE UP (ref 0–6)
GLUCOSE BLDC GLUCOMTR-MCNC: 154 MG/DL — HIGH (ref 70–99)
GLUCOSE BLDC GLUCOMTR-MCNC: 249 MG/DL — HIGH (ref 70–99)
GLUCOSE SERPL-MCNC: 137 MG/DL — HIGH (ref 70–99)
HCT VFR BLD CALC: 43.8 % — SIGNIFICANT CHANGE UP (ref 39–50)
HGB BLD-MCNC: 15 G/DL — SIGNIFICANT CHANGE UP (ref 13–17)
IMM GRANULOCYTES NFR BLD AUTO: 0.4 % — SIGNIFICANT CHANGE UP (ref 0–0.9)
LYMPHOCYTES # BLD AUTO: 1.3 K/UL — SIGNIFICANT CHANGE UP (ref 1–3.3)
LYMPHOCYTES # BLD AUTO: 11.3 % — LOW (ref 13–44)
MAGNESIUM SERPL-MCNC: 2.2 MG/DL — SIGNIFICANT CHANGE UP (ref 1.6–2.6)
MCHC RBC-ENTMCNC: 30.8 PG — SIGNIFICANT CHANGE UP (ref 27–34)
MCHC RBC-ENTMCNC: 34.2 GM/DL — SIGNIFICANT CHANGE UP (ref 32–36)
MCV RBC AUTO: 89.9 FL — SIGNIFICANT CHANGE UP (ref 80–100)
MONOCYTES # BLD AUTO: 1.15 K/UL — HIGH (ref 0–0.9)
MONOCYTES NFR BLD AUTO: 10 % — SIGNIFICANT CHANGE UP (ref 2–14)
NEUTROPHILS # BLD AUTO: 8.77 K/UL — HIGH (ref 1.8–7.4)
NEUTROPHILS NFR BLD AUTO: 76.6 % — SIGNIFICANT CHANGE UP (ref 43–77)
NRBC # BLD: 0 /100 WBCS — SIGNIFICANT CHANGE UP (ref 0–0)
PLATELET # BLD AUTO: 341 K/UL — SIGNIFICANT CHANGE UP (ref 150–400)
POTASSIUM SERPL-MCNC: 4.9 MMOL/L — SIGNIFICANT CHANGE UP (ref 3.5–5.3)
POTASSIUM SERPL-SCNC: 4.9 MMOL/L — SIGNIFICANT CHANGE UP (ref 3.5–5.3)
PROT SERPL-MCNC: 6.6 G/DL — SIGNIFICANT CHANGE UP (ref 6–8.3)
RBC # BLD: 4.87 M/UL — SIGNIFICANT CHANGE UP (ref 4.2–5.8)
RBC # FLD: 13.2 % — SIGNIFICANT CHANGE UP (ref 10.3–14.5)
SODIUM SERPL-SCNC: 140 MMOL/L — SIGNIFICANT CHANGE UP (ref 135–145)
TSH SERPL-MCNC: 0.29 UIU/ML — SIGNIFICANT CHANGE UP (ref 0.27–4.2)
WBC # BLD: 11.47 K/UL — HIGH (ref 3.8–10.5)
WBC # FLD AUTO: 11.47 K/UL — HIGH (ref 3.8–10.5)

## 2023-02-03 PROCEDURE — 96375 TX/PRO/DX INJ NEW DRUG ADDON: CPT

## 2023-02-03 PROCEDURE — 84132 ASSAY OF SERUM POTASSIUM: CPT

## 2023-02-03 PROCEDURE — 83880 ASSAY OF NATRIURETIC PEPTIDE: CPT

## 2023-02-03 PROCEDURE — 82803 BLOOD GASES ANY COMBINATION: CPT

## 2023-02-03 PROCEDURE — 83605 ASSAY OF LACTIC ACID: CPT

## 2023-02-03 PROCEDURE — 84436 ASSAY OF TOTAL THYROXINE: CPT

## 2023-02-03 PROCEDURE — C1894: CPT

## 2023-02-03 PROCEDURE — 80053 COMPREHEN METABOLIC PANEL: CPT

## 2023-02-03 PROCEDURE — 83036 HEMOGLOBIN GLYCOSYLATED A1C: CPT

## 2023-02-03 PROCEDURE — C1769: CPT

## 2023-02-03 PROCEDURE — 82553 CREATINE MB FRACTION: CPT

## 2023-02-03 PROCEDURE — 84432 ASSAY OF THYROGLOBULIN: CPT

## 2023-02-03 PROCEDURE — 87637 SARSCOV2&INF A&B&RSV AMP PRB: CPT

## 2023-02-03 PROCEDURE — 81001 URINALYSIS AUTO W/SCOPE: CPT

## 2023-02-03 PROCEDURE — 86376 MICROSOMAL ANTIBODY EACH: CPT

## 2023-02-03 PROCEDURE — 83735 ASSAY OF MAGNESIUM: CPT

## 2023-02-03 PROCEDURE — 80048 BASIC METABOLIC PNL TOTAL CA: CPT

## 2023-02-03 PROCEDURE — 84443 ASSAY THYROID STIM HORMONE: CPT

## 2023-02-03 PROCEDURE — 82330 ASSAY OF CALCIUM: CPT

## 2023-02-03 PROCEDURE — C1887: CPT

## 2023-02-03 PROCEDURE — 86800 THYROGLOBULIN ANTIBODY: CPT

## 2023-02-03 PROCEDURE — 87040 BLOOD CULTURE FOR BACTERIA: CPT

## 2023-02-03 PROCEDURE — 85610 PROTHROMBIN TIME: CPT

## 2023-02-03 PROCEDURE — 99239 HOSP IP/OBS DSCHRG MGMT >30: CPT

## 2023-02-03 PROCEDURE — 93306 TTE W/DOPPLER COMPLETE: CPT

## 2023-02-03 PROCEDURE — 80061 LIPID PANEL: CPT

## 2023-02-03 PROCEDURE — 84480 ASSAY TRIIODOTHYRONINE (T3): CPT

## 2023-02-03 PROCEDURE — 99285 EMERGENCY DEPT VISIT HI MDM: CPT

## 2023-02-03 PROCEDURE — 86803 HEPATITIS C AB TEST: CPT

## 2023-02-03 PROCEDURE — 85027 COMPLETE CBC AUTOMATED: CPT

## 2023-02-03 PROCEDURE — 82550 ASSAY OF CK (CPK): CPT

## 2023-02-03 PROCEDURE — 84439 ASSAY OF FREE THYROXINE: CPT

## 2023-02-03 PROCEDURE — 94640 AIRWAY INHALATION TREATMENT: CPT

## 2023-02-03 PROCEDURE — 71045 X-RAY EXAM CHEST 1 VIEW: CPT

## 2023-02-03 PROCEDURE — 85730 THROMBOPLASTIN TIME PARTIAL: CPT

## 2023-02-03 PROCEDURE — 76536 US EXAM OF HEAD AND NECK: CPT

## 2023-02-03 PROCEDURE — 36415 COLL VENOUS BLD VENIPUNCTURE: CPT

## 2023-02-03 PROCEDURE — 84484 ASSAY OF TROPONIN QUANT: CPT

## 2023-02-03 PROCEDURE — 99231 SBSQ HOSP IP/OBS SF/LOW 25: CPT

## 2023-02-03 PROCEDURE — 96374 THER/PROPH/DIAG INJ IV PUSH: CPT

## 2023-02-03 PROCEDURE — 85025 COMPLETE CBC W/AUTO DIFF WBC: CPT

## 2023-02-03 PROCEDURE — 82962 GLUCOSE BLOOD TEST: CPT

## 2023-02-03 PROCEDURE — 84295 ASSAY OF SERUM SODIUM: CPT

## 2023-02-03 RX ORDER — ATORVASTATIN CALCIUM 80 MG/1
1 TABLET, FILM COATED ORAL
Qty: 30 | Refills: 1
Start: 2023-02-03 | End: 2023-04-03

## 2023-02-03 RX ORDER — DAPAGLIFLOZIN 10 MG/1
1 TABLET, FILM COATED ORAL
Qty: 30 | Refills: 1
Start: 2023-02-03 | End: 2023-04-03

## 2023-02-03 RX ORDER — FUROSEMIDE 40 MG
1 TABLET ORAL
Qty: 30 | Refills: 0
Start: 2023-02-03 | End: 2023-03-04

## 2023-02-03 RX ORDER — INSULIN DEGLUDEC 100 U/ML
28 INJECTION, SOLUTION SUBCUTANEOUS
Qty: 0 | Refills: 0 | DISCHARGE

## 2023-02-03 RX ORDER — SACUBITRIL AND VALSARTAN 24; 26 MG/1; MG/1
1 TABLET, FILM COATED ORAL
Qty: 60 | Refills: 1
Start: 2023-02-03 | End: 2023-04-03

## 2023-02-03 RX ORDER — SACUBITRIL AND VALSARTAN 24; 26 MG/1; MG/1
1 TABLET, FILM COATED ORAL
Qty: 0 | Refills: 0 | DISCHARGE

## 2023-02-03 RX ORDER — SPIRONOLACTONE 25 MG/1
1 TABLET, FILM COATED ORAL
Qty: 30 | Refills: 0
Start: 2023-02-03 | End: 2023-03-04

## 2023-02-03 RX ORDER — SPIRONOLACTONE 25 MG/1
1 TABLET, FILM COATED ORAL
Qty: 30 | Refills: 1
Start: 2023-02-03 | End: 2023-04-03

## 2023-02-03 RX ORDER — METOPROLOL TARTRATE 50 MG
1 TABLET ORAL
Qty: 30 | Refills: 1
Start: 2023-02-03 | End: 2023-04-03

## 2023-02-03 RX ORDER — FUROSEMIDE 40 MG
1 TABLET ORAL
Qty: 30 | Refills: 1
Start: 2023-02-03 | End: 2023-04-03

## 2023-02-03 RX ORDER — ATORVASTATIN CALCIUM 80 MG/1
1 TABLET, FILM COATED ORAL
Qty: 30 | Refills: 0
Start: 2023-02-03 | End: 2023-03-04

## 2023-02-03 RX ORDER — CLOPIDOGREL BISULFATE 75 MG/1
1 TABLET, FILM COATED ORAL
Qty: 0 | Refills: 0 | DISCHARGE

## 2023-02-03 RX ORDER — METOPROLOL TARTRATE 50 MG
1 TABLET ORAL
Qty: 30 | Refills: 0
Start: 2023-02-03 | End: 2023-03-04

## 2023-02-03 RX ADMIN — Medication 4: at 12:50

## 2023-02-03 RX ADMIN — SACUBITRIL AND VALSARTAN 1 TABLET(S): 24; 26 TABLET, FILM COATED ORAL at 06:57

## 2023-02-03 RX ADMIN — CLOPIDOGREL BISULFATE 75 MILLIGRAM(S): 75 TABLET, FILM COATED ORAL at 12:52

## 2023-02-03 RX ADMIN — Medication 18 UNIT(S): at 12:50

## 2023-02-03 RX ADMIN — Medication 81 MILLIGRAM(S): at 12:50

## 2023-02-03 RX ADMIN — Medication 18 UNIT(S): at 08:51

## 2023-02-03 RX ADMIN — SPIRONOLACTONE 25 MILLIGRAM(S): 25 TABLET, FILM COATED ORAL at 06:57

## 2023-02-03 RX ADMIN — Medication 2: at 06:59

## 2023-02-03 NOTE — PROGRESS NOTE ADULT - PROBLEM SELECTOR PROBLEM 1
DM (diabetes mellitus)
Acute on chronic systolic congestive heart failure
DM (diabetes mellitus)
Acute on chronic systolic congestive heart failure
DM (diabetes mellitus)

## 2023-02-03 NOTE — PROGRESS NOTE ADULT - PROBLEM SELECTOR PROBLEM 2
Abnormal thyroid stimulating hormone level
Leukocytosis
Abnormal thyroid stimulating hormone level
Abnormal thyroid stimulating hormone level
Leukocytosis

## 2023-02-03 NOTE — PROGRESS NOTE ADULT - ASSESSMENT
61 yo male, w/ PMH of HTN, T2DM, CHF, CAD s/p prior cardiac catheterizations with stents, and COPD (not O2 dependent), who presented to [facility] ED 1/30 c/o worsening SOB x 4 days and lower extremity edema admitted  for diuresis, also with uncontrolled T2DM with steroid induced hyperglycemia and thyroid goiter, now s/p R/L cath on 2/2/2023.

## 2023-02-03 NOTE — DISCHARGE NOTE NURSING/CASE MANAGEMENT/SOCIAL WORK - NSDCPEFALRISK_GEN_ALL_CORE
For information on Fall & Injury Prevention, visit: [email or web address] OR  [email or web address] OR  https://www.cdc.gov/steadi/patient.html

## 2023-02-03 NOTE — PROGRESS NOTE ADULT - PROBLEM SELECTOR PLAN 1
A1C: 10.1 %  BUN: 33 mg/dL  Creatinine: 1.42 mg/dL  GFR: 56 mL/min/1.73m2  Weight (kg): 96.5  BMI (kg/m2): 33.3  EF: 33%    Please decrease lantus    to 25  units at night.  Please continue lispro 18 units before each meal.  Please continue lispro moderate  dose sliding scale four times daily with meals and at bedtime    Pt's fingerstick glucose goal is 100-180    Will continue to monitor     For discharge, Tresiba 25 units HS, Ozmepic 0.25 mg weekly x 4 weeks, then increase to 0.5mg weekly, Farxiga 10mg daily.    Pt can follow up at discharge with [facility] by calling  to make an appointment.   Will discuss case with Dr. Norman   and update primary team.

## 2023-02-03 NOTE — DISCHARGE NOTE PROVIDER - NSDCMRMEDTOKEN_GEN_ALL_CORE_FT
Aspirin Enteric Coated 81 mg oral delayed release tablet: 1 tab(s) orally once a day  Entresto 24 mg-26 mg oral tablet: 1 tab(s) orally 2 times a day  Ozempic 2 mg/1.5 mL (0.25 mg or 0.5 mg dose) subcutaneous solution: 0.25 milligram(s) subcutaneously once a week for four weeks and then increase to 0.5mg weekly if tolerated.  Plavix 75 mg oral tablet: 1 tab(s) orally once a day  Tresiba 100 units/mL subcutaneous solution: 28 unit(s) subcutaneous once a day (at bedtime)   Aspirin Enteric Coated 81 mg oral delayed release tablet: 1 tab(s) orally once a day  atorvastatin 40 mg oral tablet: 1 tab(s) orally once a day (at bedtime)  dapagliflozin 10 mg oral tablet: 1 tab(s) orally every 24 hours  Entresto 24 mg-26 mg oral tablet: 1 tab(s) orally 2 times a day  furosemide 40 mg oral tablet: 1 tab(s) orally once a day  metoprolol succinate 25 mg oral tablet, extended release: 1 tab(s) orally every 24 hours  Ozempic 2 mg/1.5 mL (0.25 mg or 0.5 mg dose) subcutaneous solution: 0.25 milligram(s) subcutaneously once a week for four weeks and then increase to 0.5mg weekly if tolerated.  spironolactone 25 mg oral tablet: 1 tab(s) orally once a day  Tresiba 100 units/mL subcutaneous solution: 25 unit(s) subcutaneous once a day (at bedtime)

## 2023-02-03 NOTE — DISCHARGE NOTE PROVIDER - NSDCFUADDINST_GEN_ALL_CORE_FT
You underwent a cardiac catheterization and should wait 3 to 5 days before returning to normal activities. Do not drive for 2 days. Consult your doctor before returning to vigorous activity. You may return to work in 3-5 days. The catheter from your right wrist was removed.  Please avoid any heavy lifting (no more than 3 to 5 lbs) or strenuous activity for 5 days.      You may shower once the dressing is removed, but avoid baths, hot tubs, or swimming for 5 days to prevent infection. If you notice bleeding from the site, hardening and pain at the site, drainage or redness from the site, coolness/paleness of the right arm, weakness/paralysis of right arm (unable to lift or move) swelling, or fever, please call [phone].          A Mediterranean Diet is recommended! Some suggestions include continue incorporating 2 or more servings per day of vegetables, fruits, and whole grains. Increase intake of fish and legumes/beans to 2 or more servings per week. Aim to increase intake of healthy fats, such as olive oil and avocados, and have a handful of nuts/seeds most days. Reduce red/processed meat consumption to 2 or fewer times per week.                 You underwent a cardiac catheterization and should wait 3 to 5 days before returning to normal activities. Do not drive for 2 days. Consult your doctor before returning to vigorous activity. You may return to work in 3-5 days. The catheter from your right wrist was removed.  Please avoid any heavy lifting (no more than 3 to 5 lbs) or strenuous activity for 5 days.      You may shower once the dressing is removed, but avoid baths, hot tubs, or swimming for 5 days to prevent infection. If you notice bleeding from the site, hardening and pain at the site, drainage or redness from the site, coolness/paleness of the right arm, weakness/paralysis of right arm (unable to lift or move) swelling, or fever, please call [phone].     A Mediterranean Diet is recommended! Some suggestions include continue incorporating 2 or more servings per day of vegetables, fruits, and whole grains. Increase intake of fish and legumes/beans to 2 or more servings per week. Aim to increase intake of healthy fats, such as olive oil and avocados, and have a handful of nuts/seeds most days. Reduce red/processed meat consumption to 2 or fewer times per week.

## 2023-02-03 NOTE — DISCHARGE NOTE NURSING/CASE MANAGEMENT/SOCIAL WORK - PATIENT PORTAL LINK FT
You can access the FollowMyHealth Patient Portal offered by [facility] by registering at the following website: [email or web address] By joining [email or web address] FollowMyHealth portal, you will also be able to view your health information using other applications (apps) compatible with our system.

## 2023-02-03 NOTE — PROGRESS NOTE ADULT - PROBLEM SELECTOR PROBLEM 3
Thyromegaly
Thyromegaly
Chronic obstructive pulmonary disease (COPD)
Thyromegaly
Chronic obstructive pulmonary disease (COPD)

## 2023-02-03 NOTE — DISCHARGE NOTE PROVIDER - PROVIDER TOKENS
PROVIDER:[TOKEN:[[id number]],SCHEDULEDAPPT:[03/21/2023],SCHEDULEDAPPTTIME:[02:45 PM],ESTABLISHEDPATIENT:[T]] PROVIDER:[TOKEN:[[id number]],SCHEDULEDAPPT:[03/21/2023],SCHEDULEDAPPTTIME:[02:45 PM],ESTABLISHEDPATIENT:[T]],FREE:[LAST:[Nurse Practitioner],FIRST:[Heart Failure],PHONE:[phone]],FAX:[(   )    -],ADDRESS:[[address] [place] [address]],SCHEDULEDAPPT:[02/10/2023],SCHEDULEDAPPTTIME:[01:00 PM]]

## 2023-02-03 NOTE — DISCHARGE NOTE NURSING/CASE MANAGEMENT/SOCIAL WORK - TOBACCO CESSATION EDUCATION/COUNSELLING(PROVIDED IF TOBACCO USED IN THE PAST 30 DAYS) NON CORE MEASURE SITES
Offered and patient declined

## 2023-02-03 NOTE — PROGRESS NOTE ADULT - PROBLEM SELECTOR PLAN 2
TSH 0.173 and total t4 7.88. TSH artificially low due to solumedrol 125mg given the night before labs drawn.  Repeat TSH 0.286 - Resolved.

## 2023-02-03 NOTE — DISCHARGE NOTE NURSING/CASE MANAGEMENT/SOCIAL WORK - HAS THE PATIENT USED TOBACCO IN THE PAST 30 DAYS?
He will require lifelong aspirin therapy. Risk factor modification including control of hypertension and hyperlipidemia were reviewed with the patient.   Yes

## 2023-02-03 NOTE — PROGRESS NOTE ADULT - SUBJECTIVE AND OBJECTIVE BOX
OVERNIGHT: No acute events overnight.   SUBJECTIVE: Patient was seen and examined this morning. No new complaints. No blockage on cath. Discharge today.    CAPILLARY BLOOD GLUCOSE & INSULIN RECEIVED  Yesterday  - Dinner FS mg/dL = 0 insulin. Off unit.  Had late breakfast of salmon, ratatouille, and fruit cup without premeal insulin  - Bedtime FS mg/dL = 30 units of Lantus + 8 units Lispro sliding scale.     Today  - Breakfast FS mg/dL = Lispro 18+2. Ate juevos rancheros, oatmeal, eggs, english muffin.  - Lunch FS mg/dL = Lispro 18 + 4    249 mg/dL ( @ 12:11)  154 mg/dL ( @ 06:38)  326 mg/dL ( @ 21:45)  267 mg/dL ( @ 18:44)    REVIEW OF SYSTEMS  Constitutional:  Negative fever, chills or loss of appetite.  Eyes:  Negative blurry vision or double vision.  Cardiovascular:  Negative for chest pain or palpitations.  Respiratory:  Negative for cough, wheezing, or shortness of breath.    Gastrointestinal:  Negative for nausea, vomiting, diarrhea, constipation, or abdominal pain.  Genitourinary:  Negative frequency, urgency or dysuria.  Neurologic:  No headache, confusion, dizziness, lightheadedness.    PHYSICAL EXAM  Vital Signs Last 24 Hrs  T(C): 36 (2023 13:43), Max: 36.4 (2023 22:20)  T(F): 96.8 (2023 13:43), Max: 97.5 (2023 22:20)  HR: 106 (2023 16:36) (98 - 108)  BP: 135/77 (2023 16:36) (96/61 - 135/77)  BP(mean): 98 (2023 16:36) (74 - 98)  RR: 18 (2023 16:36) (18 - 19)  SpO2: 98% (2023 16:36) (97% - 99%)    Parameters below as of 2023 16:36  Patient On (Oxygen Delivery Method): room air    Constitutional: Awake, alert, in no acute distress.   HEENT: Normocephalic, atraumatic, ERIKA, no proptosis or lid retraction.   Neck: supple, no acanthosis, no thyromegaly or palpable thyroid nodules.  Respiratory: Lungs clear to ausculation bilaterally.   Cardiovascular: regular rhythm, normal S1 and S2, no audible murmurs.   GI: soft, non-tender, non-distended, bowel sounds present, no masses appreciated.  Extremities: No lower extremity edema, peripheral pulses present.   Skin: no rashes.   Psychiatric: AAO x 3. Normal affect/mood.     LABS  CBC - WBC/HGB/HTC/PLT: 11.47/15.0/43.8/341 (23)  BMP - Na/K/Cl/Bicarb/BUN/Cr/Gluc: 140/4.9/105/28/31/1.45/137 (23)  Anion Gap: 7 (23)  eGFR: 55 (23)  Calcium: 8.6 (23)  Phosphorus: -- (23)  Magnesium: 2.2 (23)  LFT - Alb/Tprot/Tbili/Dbili/AlkPhos/ALT/AST: 3.7/--/0.6/--/85/12/10 (23)  PT/aPTT/INR: 11.4/32.6/0.96 (23)   Thyroid Stimulating Hormone, Serum: 0.286 (23)  Total T4/Free T4: --/1.150 (23)      MEDICATIONS  MEDICATIONS  (STANDING):  aspirin  chewable 81 milliGRAM(s) Oral daily  atorvastatin 40 milliGRAM(s) Oral at bedtime  clopidogrel Tablet 75 milliGRAM(s) Oral daily  dapagliflozin 10 milliGRAM(s) Oral every 24 hours  dextrose 5%. 1000 milliLiter(s) (50 mL/Hr) IV Continuous <Continuous>  dextrose 5%. 1000 milliLiter(s) (100 mL/Hr) IV Continuous <Continuous>  dextrose 50% Injectable 25 Gram(s) IV Push once  dextrose 50% Injectable 12.5 Gram(s) IV Push once  dextrose 50% Injectable 25 Gram(s) IV Push once  enoxaparin Injectable 40 milliGRAM(s) SubCutaneous every 24 hours  furosemide    Tablet 40 milliGRAM(s) Oral daily  glucagon  Injectable 1 milliGRAM(s) IntraMuscular once  influenza   Vaccine 0.5 milliLiter(s) IntraMuscular once  insulin glargine Injectable (LANTUS) 30 Unit(s) SubCutaneous at bedtime  insulin lispro (ADMELOG) corrective regimen sliding scale   SubCutaneous Before meals and at bedtime  insulin lispro Injectable (ADMELOG) 18 Unit(s) SubCutaneous three times a day before meals  metoprolol succinate ER 25 milliGRAM(s) Oral every 24 hours  sacubitril 24 mG/valsartan 26 mG 1 Tablet(s) Oral two times a day  sodium chloride 0.9%. 500 milliLiter(s) (50 mL/Hr) IV Continuous <Continuous>  spironolactone 25 milliGRAM(s) Oral daily    MEDICATIONS  (PRN):  albuterol/ipratropium for Nebulization 3 milliLiter(s) Nebulizer every 6 hours PRN Shortness of Breath and/or Wheezing  dextrose Oral Gel 15 Gram(s) Oral once PRN Blood Glucose LESS THAN 70 milliGRAM(s)/deciliter      INTERPRETATION:  THYROID ULTRASOUND with Doppler dated 2023 9:04 PM    History: low TSH    Technique: Ultrasound evaluation of the thyroid was performed in the   axial and sagittal projections. Color Doppler evaluation was also   performed.    Prior study: None.    Findings:    RIGHT LOBE:  Dimensions: 7.5 x 2.8 x 3.2 cm (sagittal x AP x transverse)  Echotexture: Homogenous  Vascularity: Normal  Multiple nodules seen, most suspicious discrete nodule as follows    Location: Right midpole, posterior  Dimensions: 1.7 x 0.9 x 1.3 cm (sagittal x AP x transverse)  Composition: Solid  For solid nodules or for the solid portion of a partially cystic nodule,   does the solid portion have any of the following suspicious features?  -  Yes: Hypoechoic  -  No: Microcalcifications  -  No: Irregular margin (infiltrative or microlobulated)  -  No: Taller than wide (AP dimension > transverse)  -  No: Extrathyroidal extension  -  No: Interrupted rim calcification with soft tissue extrusion    LEFT LOBE:  Dimensions: 8.2 x 3.7 x 4.0 cm (sagittal x AP x transverse)  Echotexture: Homogenous  Vascularity: Normal  Multiple nodules seen, most suspicious discrete nodules as follows:    Location: Left lower pole  Dimensions: 1.9 x 3.2 x 2.8 cm (sagittal x AP x transverse)  Composition: Solid  For solid nodules or for the solid portion of a partially cystic nodule,   does the solid portion have any of the following suspicious features?  -  No: Hypoechoic  -  No: Microcalcifications  -  No: Irregular margin (infiltrative or microlobulated)  -  Yes: Taller than wide (AP dimension > transverse)  -  No: Extrathyroidal extension  -  No: Interrupted rim calcification with soft tissue extrusion    Location: Left mid pole  Dimensions: 2.7 x 1.9 x 2.3 cm (sagittal x AP x transverse)  Composition: Solid  For solid nodules or for the solid portion of a partially cystic nodule,   does the solid portion have any of the following suspicious features?  -  No: Hypoechoic  -  No: Microcalcifications  -  No: Irregular margin (infiltrative or microlobulated)  -  No: Taller than wide (AP dimension > transverse)  -  No: Extrathyroidal extension  -  No: Interrupted rim calcification with soft tissue extrusion      ISTHMUS:  Dimensions: 0.3 cm AP  The right isthmus contains a benign colloid cyst 1.0 x 1.3 x 1.1 cm.    CERVICAL LYMPH NODE EVALUATION:  -  No abnormal lymph nodes are identified in the neck.    PARATHYROID EXAMINATION:  -  Parathyroid glands not visualized.      IMPRESSION:  Enlarged multinodular thyroid gland. The 3 thyroid nodules detailed above   meet criteria for fine-needle aspiration biopsy.    ----------------------------------------------------------------  RECOMMENDATIONS ARE BASED UPON THE 2015 [employer]ICAN [employer]IATION   MANAGEMENT GUIDELINES. Thresholds for recommended fine needle aspiration   are as follows:  *  Solid nodule with one or more suspicious sonographic features greater   than or equal to 1.0-cm  *  Solid nodule without suspicious sonographic features greater than or   equal to 1.5-cm  *  Partially cystic nodule in which the solid component has one or more   suspicious sonographic features greater than or equal to 1.0-cm  *  Partially cystic nodule with eccentric solid area(s) without   suspicious sonographic features greater than or equal to 1.5-cm  *  Partially cystic nodule without suspicious sonographic features   greater than or equal to 2.0-cm  *  Spongiform nodule greater than or equal to 2.0-cm    --- End of Report ---          JANELL PUENTE MD; Resident Radiologist  This document has been electronically signed.  BRUCE SPARKS MD; Attending Radiologist  This document has been electronically signed. 2023 10:11AM

## 2023-02-03 NOTE — PROGRESS NOTE ADULT - PROBLEM SELECTOR PLAN 3
Enlarged thyroid - hx of FNA.  Thyroid US obtained. 3 nodules meeting FNA criteria.  Provided report to patient to share with primary to compare with previously biopsied nodule, and advised him that additional nodules may need to be biopsied as OP.  [facility]
Enlarged thyroid - hx of FNA.  Thyroid US obtain. 3 nodules meeting FNA criteria.  Provided report to patient to share with primary to compare with previously biopsied nodule, and advised him that additional nodules may need to be biopsied as OP.
Enlarged thyroid - hx of FNA.  Please obtain thyroid US.
SOB could also be attributed to COPD exacerbation  - s/p Solumedrol 125mg x1 and Duoneb x1 in ED  - Continue Duoneb PRN for SOB/wheezing
SOB could also be attributed to COPD exacerbation  - s/p Solumedrol 125mg x1 and Duoneb x1 in ED  - Continue Duoneb PRN for SOB/wheezing

## 2023-02-03 NOTE — DISCHARGE NOTE PROVIDER - CARE PROVIDER_API CALL
Carlos Mace  Cardiology  [facility] [place] [address]  Phone: [phone]  Fax: [phone]  Established Patient  Scheduled Appointment: 03/21/2023 02:45 PM   Carlos Mace  Cardiology  [address] [place] [address]  Phone: [phone]  Fax: [phone]  Established Patient  Scheduled Appointment: 03/21/2023 02:45 PM    Nurse Practitioner, Heart Failure  [address] [place] [address] floor  [facility]  Phone: [phone]  Fax: (   )    -  Scheduled Appointment: 02/10/2023 01:00 PM

## 2023-02-03 NOTE — DISCHARGE NOTE PROVIDER - NSDCCPCAREPLAN_GEN_ALL_CORE_FT
PRINCIPAL DISCHARGE DIAGNOSIS  Diagnosis: Acute on chronic systolic congestive heart failure  Assessment and Plan of Treatment: You were found to have decreased heart function (EF 33%).  Normal heart function is 55% and above. You are at risk for fluid overload as your heart does not pump normally. Weigh yourself daily. If you notice weight gain of 2 or more lbs in 1 day or 5 lbs in 1 week and/or shortness of breath and or leg swelling contact your physician immediately and proceed to nearest Emergency Room.    RESTRICT YOUR DAILY FLUID INTAKE TO 1.L-1.5 L -1000 mL-1500 mL (cc) daily. Please take your medications as prescribed: Lasix 40 mg Once Daily, Entresto 24/26 mg Twice Daily. You were also started on Toprol XL 25 Once Daily, Spironolactone 25 mg Once Daily, and Farxiga 10 mg Once Daily. You have a follow-up appt with Dr. Mace on 3/21/23 at 2:45 PM. This was the soonest appointment they had and will contact you if someone reschedules and there is an earlier appt.      SECONDARY DISCHARGE DIAGNOSES  Diagnosis: CAD (coronary artery disease)  Assessment and Plan of Treatment: -You underwent a cardiac cath which showed non obstructive CAD. Continue Aspirin. Lipitor 40 mg daily was started. Have Liver Function and Cholesterol Levels checked in 1 month. Please stop Plavix.    Diagnosis: Type 2 diabetes mellitus not at goal  Assessment and Plan of Treatment: Monitor Fingersticks before meals and at bedtime. Your Hemoglobin A1C was found to be 10.1% which means your Diabetes is uncontrolled. Goal Hemoglobin A1C is <7%. You were seen by Endocrinology and your medications were adjusted. Continue Tresiba 25 units Sub Q at bedtime. STOP VICTOZA and take Ozempic instead. You were started on Farxiga 10 mg Once Daily. Follow-up with your Primary Care Physician and Endocrinologist. You can also follow-up at [facility] by calling .    Diagnosis: Hypertension  Assessment and Plan of Treatment: Maintain Low Salt Diet Less than 2000 mg-2 g daily.    Diagnosis: Hyperlipidemia  Assessment and Plan of Treatment: Maintain Low Cholesterol Diet. LDL is also known as "bad cholesterol" because it takes cholesterol to your arteries, where it may collect in artery walls. Too much cholesterol in your arteries may lead to a buildup of plaque known as atherosclerosis and contribute to heart disease. Continue Aspirin. Lipitor 40 mg daily was started. Have Liver Function and Cholesterol Levels checked in 1 month    Diagnosis: Thyroid nodule  Assessment and Plan of Treatment: You were found to have multiple nodules on your thyroid gland. Please follow-up with your Primary Care Physician and Endocrinologist for further management as you may need biopsy. Your TSH on 1/31/23 was 0.173. Repeat TSH was 0.286 on 2/3/23.     PRINCIPAL DISCHARGE DIAGNOSIS  Diagnosis: Acute on chronic systolic congestive heart failure  Assessment and Plan of Treatment: You were found to have decreased heart function (EF 33%).  Normal heart function is 55% and above. You are at risk for fluid overload as your heart does not pump normally. Weigh yourself daily. If you notice weight gain of 2 or more lbs in 1 day or 5 lbs in 1 week and/or shortness of breath and or leg swelling contact your physician immediately and proceed to nearest Emergency Room.    RESTRICT YOUR DAILY FLUID INTAKE TO 1.L-1.5 L -1000 mL-1500 mL (cc) daily. Please take your medications as prescribed: Lasix 40 mg Once Daily, Entresto 24/26 mg Twice Daily. You were also started on Toprol XL 25 Once Daily, Spironolactone 25 mg Once Daily, and Farxiga 10 mg Once Daily. [facility]up at [facility] with [facility] Nurse Practitioner on FRIDAY 2/10/23 AT 1 PM.   You have a follow-up appt with Dr. Mace on 3/21/23 at 2:45 PM. This was the soonest appointment they had and will contact you if someone reschedules and there is an earlier appt.      SECONDARY DISCHARGE DIAGNOSES  Diagnosis: Encounter for cardiac rehabilitation  Assessment and Plan of Treatment: We have provided you with a prescription for cardiac rehab which is medically supervised exercise program for your heart and has been shown to improve the quantity and quality of life of people with heart disease like yours. You should attend cardiac rehab 3 times per week for 12 weeks. We have provided you with a list of nearby facilities. Please call your insurance carrier to determine which of these facilities are covered under your plan. Please bring this prescription with you to your follow up appointment with your cardiologist who can then further assist you to enroll into a cardiac rehab program.    Diagnosis: CAD (coronary artery disease)  Assessment and Plan of Treatment: -You underwent a cardiac cath which showed non obstructive CAD. Continue Aspirin. Lipitor 40 mg daily was started. Have Liver Function and Cholesterol Levels checked in 1 month. Please stop Plavix.    Diagnosis: Type 2 diabetes mellitus not at goal  Assessment and Plan of Treatment: Monitor Fingersticks before meals and at bedtime. Your Hemoglobin A1C was found to be 10.1% which means your Diabetes is uncontrolled. Goal Hemoglobin A1C is <7%. You were seen by Endocrinology and your medications were adjusted. Continue Tresiba 25 units Sub Q at bedtime. STOP VICTOZA and take Ozempic instead. You were started on Farxiga 10 mg Once Daily. Follow-up with your Primary Care Physician and Endocrinologist. You can also follow-up at [facility] by calling .    Diagnosis: Hypertension  Assessment and Plan of Treatment: Maintain Low Salt Diet Less than 2000 mg-2 g daily.    Diagnosis: Hyperlipidemia  Assessment and Plan of Treatment: Maintain Low Cholesterol Diet. LDL is also known as "bad cholesterol" because it takes cholesterol to your arteries, where it may collect in artery walls. Too much cholesterol in your arteries may lead to a buildup of plaque known as atherosclerosis and contribute to heart disease. Continue Aspirin. Lipitor 40 mg daily was started. Have Liver Function and Cholesterol Levels checked in 1 month    Diagnosis: Thyroid nodule  Assessment and Plan of Treatment: You were found to have multiple nodules on your thyroid gland. Please follow-up with your Primary Care Physician and Endocrinologist for further management as you may need biopsy. Your TSH on 1/31/23 was 0.173. Repeat TSH was 0.286 on 2/3/23.

## 2023-02-03 NOTE — DISCHARGE NOTE PROVIDER - HOSPITAL COURSE
63 yo male, POOR HISTORIAN, w/ PMH of HTN, DM II, CHF (EF unknown), CAD s/p prior cardiac catheterizations with PCI , and COPD (not O2 dependent), who presented to [facility] ED 1/30/23 c/o worsening SOB x 4 days. BNP elevated and CXR with evidence of interstitial edema. Patient admitted to 5 [facility] for suspected acute on chronic heart failure exacerbation. During hospital course Troponin 0.03 x 2. Patient diuresed and net negative 4.6 L since admission. - ECHO 01/31/23: LVEF 33%, mod-severely reduced LVSF, severely dilated LV , no significant valvular disease, no pericardial effusion.      s/p Diagnostic R+LHC 2/2:  LHC: Mild diffuse non-obs CAD;  RHC: RA 10, PA 24/17, PCWP 11, CI1.8, CO 3.75. Leukocytosis noted on admission WBC 16.19 trended down to 11.47 on day of discharge. Blood Cx negative x 3 days. RVP negative. Patient remained afebrile. Endocrinology consulted for uncontrolled DM Type II A1c 10.1% for which medication regimen was adjusted. Patient to be discharged on Tresiba 26 units QHS , Stop Victoza and take Ozempic as well as Farxiga 10 mg daily. Hospital course also significant for TSH 0.173 (thought to be due to Solumedrol he received the night prior). Repeat TSH 0.286. Thyroid U/S revealed Enlarged multinodular thyroid gland. The 3 thyroid nodules detailed in report meet criteria for fine-needle aspiration biopsy. Patient to follow-up with PCP and Endocrinologist. Patient seen and examined this AM and has no complaints. VSS. Patient voided and ambulated without issue. Labs and telemetry reviewed which are unremarkable aside from Leukocytosis which is improving and elevated Cr 1.42 (Cr 1.45 day prior,  and Cr 1.21 on day of admission). Right radial stable with no hematoma or bleed. Radial Pulse 2+.  Patient C/P free and HD stable and cleared for discharge by Dr. Wilson. Patient has been given appropriate discharge instructions including medication regimen, access site management, and follow up. All needed prescriptions have been e-prescribed to patients preferred outpatient pharmacy.      Meds on discharge: ASA 81 mg PO daily, Entresto 24/26 mg PO BID, Metoprolol Succinate ER 25 mg PO Daily, Spironolactone 25 mg PO Daily, Farxiga 10 mg PO Daily, Atorvastatin 40 mg PO Daily.      Cardiac Rehab (STEMI/NSTEMI/ACS/Unstable Angina/CHF/Post PCI):            *Education on benefits of Cardiac Rehab provided to patient: Yes         *Referral and Prescription Given for Cardiac Rehab : Yes         *Pt given list of locations & instructed to contact their insurance company to review list of participating providers         63 yo male, POOR HISTORIAN, w/ PMH of HTN, DM II, CHF (EF unknown), CAD s/p prior cardiac catheterizations with PCI , and COPD (not O2 dependent), who presented to [facility] ED 1/30/23 c/o worsening SOB x 4 days. BNP elevated and CXR with evidence of interstitial edema. Patient admitted to 5 [facility] for suspected acute on chronic heart failure exacerbation. During hospital course Troponin 0.03 x 2. Patient diuresed and net negative 4.6 L since admission. - ECHO 01/31/23: LVEF 33%, mod-severely reduced LVSF, severely dilated LV , no significant valvular disease, no pericardial effusion.      s/p Diagnostic R+LHC 2/2:  LHC: Mild diffuse non-obs CAD;  RHC: RA 10, PA 24/17, PCWP 11, CI1.8, CO 3.75. Leukocytosis noted on admission WBC 16.19 trended down to 11.47 on day of discharge. Blood Cx negative x 3 days. RVP negative. Patient remained afebrile. Endocrinology consulted for uncontrolled DM Type II A1c 10.1% for which medication regimen was adjusted. Patient to be discharged on Tresiba 26 units QHS , Stop Victoza and take Ozempic as well as Farxiga 10 mg daily. Hospital course also significant for TSH 0.173 (thought to be due to Solumedrol he received the night prior). Repeat TSH 0.286. Thyroid U/S revealed Enlarged multinodular thyroid gland. The 3 thyroid nodules detailed in report meet criteria for fine-needle aspiration biopsy. Patient to follow-up with PCP and Endocrinologist. Patient seen and examined this AM and has no complaints. VSS. Patient voided and ambulated without issue. Labs and telemetry reviewed which are unremarkable aside from Leukocytosis which is improving and elevated Cr 1.42 (Cr 1.45 day prior,  and Cr 1.21 on day of admission). Right radial stable with no hematoma or bleed. Radial Pulse 2+.  Patient C/P free and HD stable and cleared for discharge by Dr. Wilson. Patient has been given appropriate discharge instructions including medication regimen, access site management, and follow up. All needed prescriptions have been e-prescribed to patients preferred outpatient pharmacy.      Meds on discharge: ASA 81 mg PO daily, Entresto 24/26 mg PO BID, Metoprolol Succinate ER 25 mg PO Daily, Spironolactone 25 mg PO Daily, Farxiga 10 mg PO Daily, Atorvastatin 40 mg PO Daily.      Cardiac Rehab (STEMI/NSTEMI/ACS/Unstable Angina/CHF/Post PCI):            *Education on benefits of Cardiac Rehab provided to patient: Yes         *Referral and Prescription Given for Cardiac Rehab : Yes         *Pt given list of locations & instructed to contact their insurance company to review list of participating providers    CHF: Beta Blocker Prescribed: Yes           ACE-I/ARB/Entresto Prescribed: Yes    Dx: TREATMENT FOR STEMI or HEART FAILURE THIS ADMISSION: YES         If Yes to STEMI or Heart Failure: 7 day Follow-Up Appointment Made: 2/10/23 at 1 PM

## 2023-02-05 LAB
CULTURE RESULTS: SIGNIFICANT CHANGE UP
CULTURE RESULTS: SIGNIFICANT CHANGE UP
SPECIMEN SOURCE: SIGNIFICANT CHANGE UP
SPECIMEN SOURCE: SIGNIFICANT CHANGE UP

## 2023-02-06 DIAGNOSIS — Z95.810 PRESENCE OF AUTOMATIC (IMPLANTABLE) CARDIAC DEFIBRILLATOR: ICD-10-CM

## 2023-02-06 DIAGNOSIS — Z95.5 PRESENCE OF CORONARY ANGIOPLASTY IMPLANT AND GRAFT: ICD-10-CM

## 2023-02-06 DIAGNOSIS — T38.0X5A ADVERSE EFFECT OF GLUCOCORTICOIDS AND SYNTHETIC ANALOGUES, INITIAL ENCOUNTER: ICD-10-CM

## 2023-02-06 DIAGNOSIS — D72.829 ELEVATED WHITE BLOOD CELL COUNT, UNSPECIFIED: ICD-10-CM

## 2023-02-06 DIAGNOSIS — I11.0 HYPERTENSIVE HEART DISEASE WITH HEART FAILURE: ICD-10-CM

## 2023-02-06 DIAGNOSIS — I25.10 ATHEROSCLEROTIC HEART DISEASE OF NATIVE CORONARY ARTERY WITHOUT ANGINA PECTORIS: ICD-10-CM

## 2023-02-06 DIAGNOSIS — E78.5 HYPERLIPIDEMIA, UNSPECIFIED: ICD-10-CM

## 2023-02-06 DIAGNOSIS — I50.23 ACUTE ON CHRONIC SYSTOLIC (CONGESTIVE) HEART FAILURE: ICD-10-CM

## 2023-02-06 DIAGNOSIS — J44.9 CHRONIC OBSTRUCTIVE PULMONARY DISEASE, UNSPECIFIED: ICD-10-CM

## 2023-02-06 DIAGNOSIS — E04.9 NONTOXIC GOITER, UNSPECIFIED: ICD-10-CM
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
## 2023-02-06 DIAGNOSIS — E11.65 TYPE 2 DIABETES MELLITUS WITH HYPERGLYCEMIA: ICD-10-CM

## 2023-02-06 PROBLEM — Z00.00 ENCOUNTER FOR PREVENTIVE HEALTH EXAMINATION: Status: ACTIVE | Noted: 2023-02-06

## 2023-02-08 RX ORDER — INSULIN DEGLUDEC INJECTION 100 U/ML
100 INJECTION, SOLUTION SUBCUTANEOUS
Refills: 0 | Status: ACTIVE | COMMUNITY
Start: 2023-02-08

## 2023-02-08 RX ORDER — SEMAGLUTIDE 1.34 MG/ML
2 INJECTION, SOLUTION SUBCUTANEOUS
Refills: 0 | Status: ACTIVE | COMMUNITY
Start: 2023-02-08

## 2023-02-08 RX ORDER — ATORVASTATIN CALCIUM 40 MG/1
40 TABLET, FILM COATED ORAL
Qty: 30 | Refills: 3 | Status: ACTIVE | COMMUNITY
Start: 2023-02-08

## 2023-02-08 RX ORDER — ASPIRIN ENTERIC COATED TABLETS 81 MG 81 MG/1
81 TABLET, DELAYED RELEASE ORAL
Qty: 90 | Refills: 3 | Status: ACTIVE | COMMUNITY
Start: 2023-02-08

## 2023-02-10 ENCOUNTER — APPOINTMENT (OUTPATIENT)
Dept: HEART FAILURE | Facility: CLINIC | Age: 63
End: 2023-02-10
Payer: MEDICARE

## 2023-02-10 VITALS
TEMPERATURE: 98.2 F | SYSTOLIC BLOOD PRESSURE: 101 MMHG | WEIGHT: 207 LBS | OXYGEN SATURATION: 97 % | HEART RATE: 95 BPM | DIASTOLIC BLOOD PRESSURE: 77 MMHG | BODY MASS INDEX: 33.27 KG/M2 | HEIGHT: 66 IN

## 2023-02-10 DIAGNOSIS — Z87.09 PERSONAL HISTORY OF OTHER DISEASES OF THE RESPIRATORY SYSTEM: ICD-10-CM

## 2023-02-10 DIAGNOSIS — I25.10 ATHEROSCLEROTIC HEART DISEASE OF NATIVE CORONARY ARTERY W/OUT ANGINA PECTORIS: ICD-10-CM

## 2023-02-10 DIAGNOSIS — I42.0 DILATED CARDIOMYOPATHY: ICD-10-CM

## 2023-02-10 DIAGNOSIS — E11.37X3 TYPE 2 DIABETES MELLITUS WITH DIABETIC MACULAR EDEMA, RESOLVED FOLLOWING TREATMENT, BILATERAL: ICD-10-CM

## 2023-02-10 DIAGNOSIS — J44.9 CHRONIC OBSTRUCTIVE PULMONARY DISEASE, UNSPECIFIED: ICD-10-CM

## 2023-02-10 DIAGNOSIS — Z79.4 TYPE 2 DIABETES MELLITUS WITH DIABETIC MACULAR EDEMA, RESOLVED FOLLOWING TREATMENT, BILATERAL: ICD-10-CM

## 2023-02-10 PROCEDURE — 99215 OFFICE O/P EST HI 40 MIN: CPT

## 2023-02-10 RX ORDER — FUROSEMIDE 40 MG/1
40 TABLET ORAL DAILY
Qty: 30 | Refills: 3 | Status: ACTIVE | COMMUNITY
Start: 2023-02-08 | End: 1900-01-01

## 2023-02-10 RX ORDER — SPIRONOLACTONE 25 MG/1
25 TABLET ORAL DAILY
Qty: 30 | Refills: 3 | Status: ACTIVE | COMMUNITY
Start: 2023-02-08 | End: 1900-01-01

## 2023-02-10 RX ORDER — DAPAGLIFLOZIN 10 MG/1
10 TABLET, FILM COATED ORAL DAILY
Qty: 30 | Refills: 3 | Status: ACTIVE | COMMUNITY
Start: 2023-02-08 | End: 1900-01-01

## 2023-02-10 RX ORDER — METOPROLOL SUCCINATE 25 MG/1
25 TABLET, EXTENDED RELEASE ORAL TWICE DAILY
Qty: 60 | Refills: 3 | Status: ACTIVE | COMMUNITY
Start: 2023-02-08 | End: 1900-01-01

## 2023-02-10 RX ORDER — SACUBITRIL AND VALSARTAN 24; 26 MG/1; MG/1
24-26 TABLET, FILM COATED ORAL TWICE DAILY
Qty: 60 | Refills: 3 | Status: ACTIVE | COMMUNITY
Start: 2023-02-08 | End: 1900-01-01

## 2023-02-24 ENCOUNTER — APPOINTMENT (OUTPATIENT)
Dept: CARDIOLOGY | Facility: CLINIC | Age: 63
End: 2023-02-24

## 2023-02-28 ENCOUNTER — APPOINTMENT (OUTPATIENT)
Dept: HEART FAILURE | Facility: CLINIC | Age: 63
End: 2023-02-28

## 2023-02-28 NOTE — CARDIOLOGY SUMMARY
[de-identified] :  ECHO 01/31/23: LVEF 33%, mod-severely reduced LVSF, severely dilated LV , no significant valvular disease, no pericardial effusion.  \par \par  [de-identified] : R+LHC 2/2:  LHC: Mild diffuse non-obs CAD;  RHC: RA 10, PA 24/17, PCWP 11, CI1.8, CO 3.75.

## 2023-02-28 NOTE — ASSESSMENT
[FreeTextEntry1] : 61 yo male, w/ PMH of HTN, DM II, NICM HFrEF ( LVIDd 6.58 LVEF 30-35%), CAD s/p prior cardiac catheterizations with stents, and COPD (not O2 dependent), AICD? Biotronik?who presented to [facility] ED 1/30 c/o worsening SOB x 4 days. Found to be in acute decompensated HF.  He is ACC/AHA Stage C exhibiting NYHA class I-II symptoms \par \par #HFrEF \par - Increase Metoprolol 25 mg QD to 50mg qd. Can take 25 mg in the AM and 25 mg in the PM. notify if HR is <60\par -Continue lasix 40 mg daily as prescribed. Take your weight daily and notify office for increase of 2lbs a day or 5lbs in a week, that is unresponsive to diurectic therapy. \par - Continue entresto 24/26 po bid; farxiga 10qd; and spironolactone 25 QD as prescribed. Obtain labs after visit today so we can review of your electrolytes and Renal function\par - Take your BP and keep a log with your weights. Bring measurements to your next visit.\par - Pt has an AICD was not seen by EP in the hospital and pt is a poor historian. Will follow up in the next visit.\par - TTE to be reordered once patient is optimized on  GDMT's\par - Pt was prescribed cardiac rehab and he would like to defer for now and try going to the gym ( "[employer] He lives in [place] and there is no cardiac rehabs near by.\par -Maintain your follow up visits so medication can be uptitrated accordingly.\par - Pt given Heart Failure Booklet to reinforce the education provided in the office today. \par \par #CAD\par - c/w ASA, plavix, atorvastatin \par \par DM (diabetes mellitus)  A1C: 10.1 %\par -Continue taking your  Tresiba 25 units HS, Ozmepic 0.25 mg weekly x 4 weeks, then increase to 0.5mg weekly, -Farxiga 10mg daily as prescribed.\par -Follow-up with Endocrinology outpatient\par \par \par RTC on 2/28/23 for follow-up Visit with the NP\par

## 2023-02-28 NOTE — HISTORY OF PRESENT ILLNESS
[FreeTextEntry1] : 61 yo male, w/ PMH of HTN, DM II, NICM HFrEF ( LVIDd 6.58 LVEF 30-35%), CAD s/p prior cardiac catheterizations with stents, and COPD (not O2 dependent), AICD? Biotronik?who presented to [facility] ED 1/30 c/o worsening SOB x 4 days. Found to be in acute decompensated HF.  He presents today for his cardiomyopathy follow up appointment post discharge: \par \par Mr. Greenfield originally presented to [facility] ED 1/30/23 c/o worsening SOB x 4 days. BNP elevated and CXR with evidence of interstitial edema. He was  admitted on chronic heart failure exacerbation. During hospital course Troponin 0.03 x 2. Patient diuresed and net negative 4.6 L since admission. - ECHO 01/31/23: LVEF 33%, mod-severely reduced LVSF, severely dilated LV , no significant valvular disease, no pericardial effusion.  s/p Diagnostic R+LHC 2/2:  LHC: Mild diffuse non-obs CAD;  RHC: RA 10, PA 24/17, PCWP 11, CI1.8, CO 3.75. Pt was started on GDMT's and treated with IV lasix. Endocrinology consulted for uncontrolled DM Type II A1c 10.1% for which medication regimen was adjusted to Tresiba 26 units QHS , Stop Victoza and take Ozempic as well as Farxiga 10 mg daily. Hospital course also significant for TSH 0.173 (thought to be due to Solumedrol he received the night prior). Repeat TSH 0.286. Thyroid U/S revealed Enlarged multinodular thyroid gland. The 3 thyroid nodules detailed in report meet criteria for fine-needle aspiration biopsy. Patient to follow-up with PCP and Endocrinologist. Patient seen and examined this AM and has no complaints. VSS. Elevated Cr 1.42 (Cr 1.45 day prior,  and Cr 1.21 on day of admission). Discharge wgt was 210 lbs. \par \par Since discharged from the hospital. Pt states he has been doing well. He was able to walk from the from entrance to the office without stopping (~800 ft). He usually sleeps with +2 pillows and is now down to +1, denies orthopnea, and PND. He does not take his BP regularly in office today he is 101/77 and HR 95. He does check routine weights and has noticed his weight going down today in the office he is 207 lbs. States with the daily diuretic therapy the urine has been robust and bowel movements are to his normal.  He averages about 2 low sodium meals a day and has been limiting his fluid to 60 oz. He denies CP, palpitations, syncope, LH/dizziness, abdominal discomfort, and LE edema.He has been taking his medications as directed. He does not use NSAIDs. His ICD has not discharged.\par

## 2023-02-28 NOTE — PHYSICAL EXAM
[Well Developed] : well developed [No Xanthelasma] : no xanthelasma [Normal Venous Pressure] : normal venous pressure [Normal S1, S2] : normal S1, S2 [Clear Lung Fields] : clear lung fields [Soft] : abdomen soft [Non Tender] : non-tender [Normal Gait] : normal gait [No Edema] : no edema [No Cyanosis] : no cyanosis [No Rash] : no rash [de-identified] : Unable to assess wearing mask  [de-identified] : JVP ~8-10 cmH2O no HJR [de-identified] : warm peripherally

## 2023-03-01 ENCOUNTER — NON-APPOINTMENT (OUTPATIENT)
Age: 63
End: 2023-03-01

## 2023-03-01 ENCOUNTER — APPOINTMENT (OUTPATIENT)
Dept: HEART FAILURE | Facility: CLINIC | Age: 63
End: 2023-03-01
Payer: MEDICARE

## 2023-03-01 VITALS
DIASTOLIC BLOOD PRESSURE: 75 MMHG | HEIGHT: 66 IN | BODY MASS INDEX: 32.95 KG/M2 | OXYGEN SATURATION: 99 % | TEMPERATURE: 98 F | WEIGHT: 205 LBS | SYSTOLIC BLOOD PRESSURE: 113 MMHG | HEART RATE: 117 BPM

## 2023-03-01 DIAGNOSIS — I10 ESSENTIAL (PRIMARY) HYPERTENSION: ICD-10-CM

## 2023-03-01 DIAGNOSIS — E11.9 TYPE 2 DIABETES MELLITUS W/OUT COMPLICATIONS: ICD-10-CM

## 2023-03-01 PROCEDURE — 99214 OFFICE O/P EST MOD 30 MIN: CPT

## 2023-03-01 NOTE — ASSESSMENT
[FreeTextEntry1] : 62 year old man with HFrEF (LVIDd 6.58 cm, LVEF 33%) s/p ?single chamber ICD, self reported PCI in 2020 at [facility], HTN and poorly controlled DMT2 (Ha1c 10.1%) who was recenely hospitalized for ADHF, RHC revealing CI of 1.8 in setting of elevated SVR. \par \par He is ACC/AHA Stage C, NYHA Class II with room for uptitration of medical therapies. Tachycardic today in the setting of not yet having taken his BB. \par

## 2023-03-01 NOTE — DISCUSSION/SUMMARY
[FreeTextEntry1] : #Chronic systolic congestive HF\par - Etiology: self reports prior PCI but [facility] with only minor luminal irregularities\par - GDMT: current regimen is Entrsto 24-26 mg BID, Toprol XL 25 mg BID, Spironolactone 25 mg QD and Farxiga 10 mg QD. Pending labs, increase Entresto to 49-51 mg BID. \par - Diuretics: will empirically lower Lasix to 20 mg QD when increasing Entresto\par - Device: has ICD, suspect single chamber. Follows with Dr. Carlos Mace who he will see later this month\par - Cardiac rehab: List of centers in [place] provided. He will notify us of a location he chooses\par - Labs: 2/2 with K 4.9, Cr 1.4. Repeat today\par \par #CAD\par - LHC 2/2/23 with only minor luminal irregularities, no mention of PCIs\par - He reports undergoing JANAK x2 in 2020 at [facility]\par - On ASA, Plavix and Statin\par - Followed by Dr. Carlos Mace\par \par #DMT2\par - Suboptimally controlled with Ha1c of 10.1%\par - on Tresiba, Ozempic and SGLT2-i\par - Managed by his PCP, Dr. Mccurdy who he will be seeing later this month\par \par #NIURKA\par - unclear prior baseline\par - Cr on admission was 1.2 and on discharge 1.45\par - Labs today \par \par Follow-up with the NP via telehealth in 2 weeks (he will purchase a BP machine and HF booklet provided)\par Follow-up with Dr. Montoya in 4 weeks in office\par

## 2023-03-01 NOTE — PHYSICAL EXAM
[Well Developed] : well developed [No Xanthelasma] : no xanthelasma [Soft] : abdomen soft [Non Tender] : non-tender [No Edema] : no edema [Well Nourished] : well nourished [No Acute Distress] : no acute distress [Normal Bowel Sounds] : normal bowel sounds [Normal] : alert and oriented, normal memory [de-identified] : wearing facemask [de-identified] : JVP 6 cm H2O, no HJR [de-identified] : central obesity [de-identified] : warm peripherally

## 2023-03-01 NOTE — CARDIOLOGY SUMMARY
[de-identified] : \par 3/1/23: ST at 106 bpm\par  [de-identified] : \par TTE 1/31/23: LVIDd 6.58, LVEF 33% (global), normal RV size and function (TAPSE 23.10), mild LAE, normal RA, trace MR, est PASP 36 mmHg\par  [de-identified] : \par 2/2/23 RHC: RA 8, PA 24/15/17, PCWP 11, AO 94/69 (78), , PA 61%, CO/CI (F) 3.75/1.81, SVR 1493 dsu, PVR 1.60 carver\par \par 2/2/23 LHC: mild luminal irregularities (no mention of JANAK)\par

## 2023-03-01 NOTE — HISTORY OF PRESENT ILLNESS
[FreeTextEntry1] : Mr. Greenfield is a 62 year old man with HFrEF (LVIDd 6.58 cm, LVEF 33%) s/p ?single chamber ICD, self reported PCI in 2020 at [facility], HTN and poorly controlled DMT2 (Ha1c 10.1%) who presents for routine follow-up of his cardiomyopathy. \par \par Recently admitted at [facility] 1/30 - 2/3/23 for decompensated HF in the setting of missing his Lasix for few days. He was diuresed and underwent R/LHC revealing minor luminal irregularities of coronary arteries and relatively normal  L and R sided filling pressures though with CI of 1.18 in the setting of elevated SVR of 1493 dsc. He was discharged the following day at a weight of 211 lbs.\par \par Since last visit few weeks ago, clinically stable. Tolerated uptitration of BB without issue. Noticeably tachycardica today which may be in the setting of not yet having taken his BB. He plans to purchase a BP machine this week. Home weight stable, yesterday was 204 lbs on Lasix 40 mg QD. AT is unchanged, recently walked 5 blocks without SOB. Has not tried stairs or inclines. He denies CP, palpitations, orthopnea, PND, ABD discomfort and LE swelling. \par \par

## 2023-03-03 ENCOUNTER — NON-APPOINTMENT (OUTPATIENT)
Age: 63
End: 2023-03-03

## 2023-03-03 DIAGNOSIS — I50.22 CHRONIC SYSTOLIC (CONGESTIVE) HEART FAILURE: ICD-10-CM

## 2023-03-03 LAB
ANION GAP SERPL CALC-SCNC: 17 MMOL/L
BUN SERPL-MCNC: 35 MG/DL
CALCIUM SERPL-MCNC: 9.3 MG/DL
CHLORIDE SERPL-SCNC: 96 MMOL/L
CO2 SERPL-SCNC: 19 MMOL/L
CREAT SERPL-MCNC: 1.57 MG/DL
EGFR: 50 ML/MIN/1.73M2
GLUCOSE SERPL-MCNC: 427 MG/DL
MAGNESIUM SERPL-MCNC: 2.2 MG/DL
NT-PROBNP SERPL-MCNC: 1477 PG/ML
POTASSIUM SERPL-SCNC: 5.8 MMOL/L
SODIUM SERPL-SCNC: 132 MMOL/L

## 2023-03-06 NOTE — ASSESSMENT
[FreeTextEntry1] : 63 yo male, w/ PMH of HTN, DM II, NICM HFrEF ( LVIDd 6.58 LVEF 30-35%), CAD s/p prior cardiac catheterizations with stents, and COPD (not O2 dependent), AICD? Biotronik?who presented to [facility] ED 1/30 c/o worsening SOB x 4 days. Found to be in acute decompensated HF.  He is ACC/AHA Stage C exhibiting NYHA class I-II symptoms \par \par #HFrEF \par - Increase Metoprolol 25 mg QD to 50mg qd. Can take 25 mg in the AM and 25 mg in the PM. notify if HR is <60\par -Continue lasix 40mg daily as prescribed. Take your weight daily and notify office for increase of 2lbs a day or 5lbs in a week, that is unresponsive to diuretic therapy. \par - Continue  entresto 24/26 po bid; farxiga 10mg qd; and spironolactone 25 QD as prescribed. Obtain labs after visit today so we can review of your electrolytes and renal function\par - Take your BP and keep a log with your weights. Bring measurements to your next visit.\par - Pt has an AICD was not seen by EP in the hospital and pt is a poor historian. Will follow up in the next visit.\par - TTE to be reordered once patient is optimized on  GDMT's\par - Pt was prescribed cardiac rehab and he would like to defer for now and try going to the gym ( [employer]). He lives in [place] and there is no cardiac rehab near by.\par -Maintain your follow up visits so medication can be uptitrated accordingly.\par - Pt given Heart Failure Booklet to reinforce the education provided in the office today. \par \par #CAD\par - c/w ASA, plavix, atorvastatin \par \par # DM ( A1C: 10.1 %)\par -Continue taking your Tresiba 25 units HS, Ozmepic, Farxiga 10mg daily as prescribed.\par -Follow-up with Endocrinology outpatient\par \par RTC on 2/28/23 for follow-up Visit with the NP

## 2023-03-06 NOTE — HISTORY OF PRESENT ILLNESS
[FreeTextEntry1] : 61 yo male, w/ PMH of HTN, DM II, NICM HFrEF ( LVIDd 6.58 LVEF 30-35%), CAD s/p prior cardiac catheterizations with stents, and COPD (not O2 dependent), AICD? Biotronik?who presented to [facility] ED 1/30 c/o worsening SOB x 4 days. Found to be in acute decompensated HF.  He presents today for follow up post recent hospital discharge.\par \par Mr Greenfield originally presented to [facility] ED 1/30/23 c/o worsening SOB x 4 days. BNP elevated and CXR with evidence of interstitial edema. He was  admitted on chronic heart failure exacerbation. During hospital course Troponin 0.03 x 2. Patient diuresed and net negative 4.6 L since admission. - ECHO 01/31/23: LVEF 33%, mod-severely reduced LVSF, severely dilated LV , no significant valvular disease, no pericardial effusion.  s/p Diagnostic R+LHC 2/2:  LHC: Mild diffuse non-obs CAD;  RHC: RA 10, PA 24/17, PCWP 11, CI1.8, CO 3.75. Pt was started on GDMT's and treated with IV lasix. Endocrinology consulted for uncontrolled DM Type II A1c 10.1% for which medication regimen was adjusted to Tresiba 26 units QHS , Stop Victoza and take Ozempic as well as Farxiga 10 mg daily. Hospital course also significant for TSH 0.173 (thought to be due to Solumedrol he received the night prior). Repeat TSH 0.286. Thyroid U/S revealed Enlarged multinodular thyroid gland. The 3 thyroid nodules detailed in report meet criteria for fine-needle aspiration biopsy. Patient to follow-up with PCP and Endocrinologist. Patient seen and examined this AM and has no complaints. VSS. Elevated Cr 1.42 (Cr 1.45 day prior,  and Cr 1.21 on day of admission). Discharge wgt was 210 lbs. \par \par Since discharged from the hospital. Pt states he has been doing well. He was able to walk from the from entrance to the office without stopping (~800 ft). He usually sleeps with +2 pillows and is now down to +1, denies orthopnea, and PND. He does not take his BP regularly in office today he is 101/77 and HR 95. He does check routine weights and has noticed his weight going down today in the office he is 207 lbs. States with the daily diuretic therapy the urine has been robust and bowel movements are to his normal.  He averages about 2 low sodium meals a day and has been limiting his fluid to 60 oz. He denies CP, palpitations, syncope, LH/dizziness, abdominal discomfort, and LE edema.He has been taking his medications as directed. He does not use NSAIDs. His ICD has not discharged.\par

## 2023-03-06 NOTE — PHYSICAL EXAM
[Well Developed] : well developed [No Xanthelasma] : no xanthelasma [Normal Venous Pressure] : normal venous pressure [Normal S1, S2] : normal S1, S2 [Clear Lung Fields] : clear lung fields [Soft] : abdomen soft [Non Tender] : non-tender [Normal Gait] : normal gait [No Edema] : no edema [No Cyanosis] : no cyanosis [No Rash] : no rash [de-identified] : Unable to assess wearing mask  [de-identified] : JVP ~8-10 cmH2O no HJR [de-identified] : warm peripherally

## 2023-03-06 NOTE — ADDENDUM
[FreeTextEntry1] : I was present for key portions of the visit with NP Lizandro. Agree with HPI, exam, A&P as noted above.\par FOUZIA RodriguezP

## 2023-03-16 ENCOUNTER — APPOINTMENT (OUTPATIENT)
Dept: HEART FAILURE | Facility: CLINIC | Age: 63
End: 2023-03-16

## 2023-04-07 ENCOUNTER — APPOINTMENT (OUTPATIENT)
Dept: HEART FAILURE | Facility: CLINIC | Age: 63
End: 2023-04-07

## 2023-07-10 NOTE — CONSULT NOTE ADULT - ATTENDING COMMENTS
Chronic debility  Patient unable to care for herself  CM working placement to LTACH  PT is seeing her      62/M with h/o CAD s/p PCI x 2(recenlty in 2021 at [facility]) following with Dr. Mace, with no recent HF hospitalization but non complinat with his meds (missed lasix for few days) now hospitalized with ADHF with exertional SOB, orthopnoea and PND.     JVP ~12cms  b/l ae +  s1s2+ RRR no gallop  no oedema 62/M with h/o CAD s/p PCI x 2(recenlty in 2021 at [facility]) following with Dr. Mace, with no recent HF hospitalization but non complinat with his meds (missed lasix for few days) now hospitalized with ADHF with exertional SOB, orthopnoea and PND.     JVP ~12cms  b/l ae +  s1s2+ RRR no gallop  no oedema    Pro BNP: 3929    Plan:  ACC/AHA Stage HfrEF  ADHF  IV lasix  cont entresto / coreg and kobi  add SGLT2i  follow LHC and RHC  will follow as needed

## 2024-03-20 ENCOUNTER — NON-APPOINTMENT (OUTPATIENT)
Age: 64
End: 2024-03-20

## 2024-06-17 NOTE — ED PROVIDER NOTE - NS_BEDUNITTYPES_ED_ALL_ED
SW/CM Discharge Plan  Informed patient is ready for discharge.  Patient to be picked up by [facility] 5pm. Patient/interested person has been counseled for post hospitalization care.  Patient agrees and understands goals and plan. Initial implementation of the patient’s discharge plan has been arranged, including any devices/equipment needed for discharge. Discharge plan communicated to family, MD, RN, CM, and Receiving Facility/Agency.    Patient’s discharge destination is Rehabilitation/Skilled Care.    Selected Continued Care - Admitted Since 6/13/2024       Destination  Coordination complete.      Service Provider Selected Services Address Phone Fax    [facility] - [facility] [address] [place] [address] , [facility] [address] -- [phone]                    Receiving Agency/Facility: [facility]  Receiving Agency/Facility phone number: [phone]  Receiving Agency/Facility address: [address] [place] [address]   Receiving Agency/Facility city/state: [place], [place]  Receiving Agency/Facility Type: Skilled Nursing Facility.   TELE/STEPDOWN

## 2024-06-29 VITALS
TEMPERATURE: 100 F | SYSTOLIC BLOOD PRESSURE: 141 MMHG | OXYGEN SATURATION: 97 % | RESPIRATION RATE: 20 BRPM | HEART RATE: 130 BPM | DIASTOLIC BLOOD PRESSURE: 101 MMHG | WEIGHT: 207.01 LBS

## 2024-06-29 LAB
ADD ON TEST-SPECIMEN IN LAB: SIGNIFICANT CHANGE UP
ANION GAP SERPL CALC-SCNC: 11 MMOL/L — SIGNIFICANT CHANGE UP (ref 5–17)
BASE EXCESS BLDV CALC-SCNC: 0.5 MMOL/L — SIGNIFICANT CHANGE UP (ref -2–3)
BASOPHILS # BLD AUTO: 0.07 K/UL — SIGNIFICANT CHANGE UP (ref 0–0.2)
BASOPHILS NFR BLD AUTO: 0.5 % — SIGNIFICANT CHANGE UP (ref 0–2)
BUN SERPL-MCNC: 23 MG/DL — SIGNIFICANT CHANGE UP (ref 7–23)
CA-I SERPL-SCNC: 1.2 MMOL/L — SIGNIFICANT CHANGE UP (ref 1.15–1.33)
CALCIUM SERPL-MCNC: 9 MG/DL — SIGNIFICANT CHANGE UP (ref 8.4–10.5)
CHLORIDE SERPL-SCNC: 102 MMOL/L — SIGNIFICANT CHANGE UP (ref 96–108)
CO2 BLDV-SCNC: 25.7 MMOL/L — SIGNIFICANT CHANGE UP (ref 22–26)
CO2 SERPL-SCNC: 22 MMOL/L — SIGNIFICANT CHANGE UP (ref 22–31)
CREAT SERPL-MCNC: 1.49 MG/DL — HIGH (ref 0.5–1.3)
EGFR: 53 ML/MIN/1.73M2 — LOW
EOSINOPHIL # BLD AUTO: 0.23 K/UL — SIGNIFICANT CHANGE UP (ref 0–0.5)
EOSINOPHIL NFR BLD AUTO: 1.7 % — SIGNIFICANT CHANGE UP (ref 0–6)
FLUAV AG NPH QL: SIGNIFICANT CHANGE UP
FLUBV AG NPH QL: SIGNIFICANT CHANGE UP
GAS PNL BLDV: 132 MMOL/L — LOW (ref 136–145)
GAS PNL BLDV: SIGNIFICANT CHANGE UP
GAS PNL BLDV: SIGNIFICANT CHANGE UP
GLUCOSE SERPL-MCNC: 215 MG/DL — HIGH (ref 70–99)
HCO3 BLDV-SCNC: 25 MMOL/L — SIGNIFICANT CHANGE UP (ref 22–29)
HCT VFR BLD CALC: 44.3 % — SIGNIFICANT CHANGE UP (ref 39–50)
HGB BLD-MCNC: 14.9 G/DL — SIGNIFICANT CHANGE UP (ref 13–17)
IMM GRANULOCYTES NFR BLD AUTO: 0.5 % — SIGNIFICANT CHANGE UP (ref 0–0.9)
LACTATE SERPL-SCNC: 1.4 MMOL/L — SIGNIFICANT CHANGE UP (ref 0.5–2)
LYMPHOCYTES # BLD AUTO: 1.79 K/UL — SIGNIFICANT CHANGE UP (ref 1–3.3)
LYMPHOCYTES # BLD AUTO: 12.8 % — LOW (ref 13–44)
MCHC RBC-ENTMCNC: 30.1 PG — SIGNIFICANT CHANGE UP (ref 27–34)
MCHC RBC-ENTMCNC: 33.6 GM/DL — SIGNIFICANT CHANGE UP (ref 32–36)
MCV RBC AUTO: 89.5 FL — SIGNIFICANT CHANGE UP (ref 80–100)
MONOCYTES # BLD AUTO: 1.19 K/UL — HIGH (ref 0–0.9)
MONOCYTES NFR BLD AUTO: 8.5 % — SIGNIFICANT CHANGE UP (ref 2–14)
NEUTROPHILS # BLD AUTO: 10.58 K/UL — HIGH (ref 1.8–7.4)
NEUTROPHILS NFR BLD AUTO: 76 % — SIGNIFICANT CHANGE UP (ref 43–77)
NRBC # BLD: 0 /100 WBCS — SIGNIFICANT CHANGE UP (ref 0–0)
NT-PROBNP SERPL-SCNC: 4960 PG/ML — HIGH (ref 0–300)
PCO2 BLDV: 37 MMHG — LOW (ref 42–55)
PH BLDV: 7.43 — SIGNIFICANT CHANGE UP (ref 7.32–7.43)
PLATELET # BLD AUTO: 389 K/UL — SIGNIFICANT CHANGE UP (ref 150–400)
PO2 BLDV: 83 MMHG — HIGH (ref 25–45)
POTASSIUM BLDV-SCNC: 5.2 MMOL/L — HIGH (ref 3.5–5.1)
POTASSIUM SERPL-MCNC: SIGNIFICANT CHANGE UP (ref 3.5–5.3)
POTASSIUM SERPL-SCNC: SIGNIFICANT CHANGE UP (ref 3.5–5.3)
RBC # BLD: 4.95 M/UL — SIGNIFICANT CHANGE UP (ref 4.2–5.8)
RBC # FLD: 12.9 % — SIGNIFICANT CHANGE UP (ref 10.3–14.5)
RSV RNA NPH QL NAA+NON-PROBE: SIGNIFICANT CHANGE UP
SAO2 % BLDV: 98.1 % — HIGH (ref 67–88)
SARS-COV-2 RNA SPEC QL NAA+PROBE: SIGNIFICANT CHANGE UP
SODIUM SERPL-SCNC: 135 MMOL/L — SIGNIFICANT CHANGE UP (ref 135–145)
TROPONIN T, HIGH SENSITIVITY RESULT: 65 NG/L — CRITICAL HIGH (ref 0–51)
TROPONIN T, HIGH SENSITIVITY RESULT: 70 NG/L — CRITICAL HIGH (ref 0–51)
WBC # BLD: 13.93 K/UL — HIGH (ref 3.8–10.5)
WBC # FLD AUTO: 13.93 K/UL — HIGH (ref 3.8–10.5)

## 2024-06-29 PROCEDURE — 99285 EMERGENCY DEPT VISIT HI MDM: CPT

## 2024-06-29 PROCEDURE — 93010 ELECTROCARDIOGRAM REPORT: CPT | Mod: 1L

## 2024-06-29 PROCEDURE — 71045 X-RAY EXAM CHEST 1 VIEW: CPT | Mod: 26

## 2024-06-29 RX ORDER — ASPIRIN 325 MG/1
324 TABLET, FILM COATED ORAL ONCE
Refills: 0 | Status: COMPLETED | OUTPATIENT
Start: 2024-06-29 | End: 2024-06-29

## 2024-06-29 RX ORDER — FUROSEMIDE 10 MG/ML
40 INJECTION, SOLUTION INTRAMUSCULAR; INTRAVENOUS ONCE
Refills: 0 | Status: COMPLETED | OUTPATIENT
Start: 2024-06-29 | End: 2024-06-29

## 2024-06-29 RX ORDER — FUROSEMIDE 10 MG/ML
20 INJECTION, SOLUTION INTRAMUSCULAR; INTRAVENOUS ONCE
Refills: 0 | Status: COMPLETED | OUTPATIENT
Start: 2024-06-29 | End: 2024-06-29

## 2024-06-29 RX ORDER — TICAGRELOR 90 MG/1
180 TABLET ORAL ONCE
Refills: 0 | Status: COMPLETED | OUTPATIENT
Start: 2024-06-29 | End: 2024-06-29

## 2024-06-29 RX ADMIN — FUROSEMIDE 40 MILLIGRAM(S): 10 INJECTION, SOLUTION INTRAMUSCULAR; INTRAVENOUS at 21:16

## 2024-06-29 RX ADMIN — TICAGRELOR 180 MILLIGRAM(S): 90 TABLET ORAL at 21:15

## 2024-06-29 RX ADMIN — ASPIRIN 324 MILLIGRAM(S): 325 TABLET, FILM COATED ORAL at 21:16

## 2024-06-30 ENCOUNTER — INPATIENT (INPATIENT)
Facility: HOSPITAL | Age: 64
LOS: 2 days | Discharge: HOME | DRG: 280 | End: 2024-07-03
Attending: INTERNAL MEDICINE | Admitting: STUDENT IN AN ORGANIZED HEALTH CARE EDUCATION/TRAINING PROGRAM
Payer: COMMERCIAL

## 2024-06-30 DIAGNOSIS — Z98.890 OTHER SPECIFIED POSTPROCEDURAL STATES: Chronic | ICD-10-CM

## 2024-06-30 DIAGNOSIS — I10 ESSENTIAL (PRIMARY) HYPERTENSION: ICD-10-CM

## 2024-06-30 DIAGNOSIS — J44.9 CHRONIC OBSTRUCTIVE PULMONARY DISEASE, UNSPECIFIED: ICD-10-CM

## 2024-06-30 DIAGNOSIS — Z95.810 PRESENCE OF AUTOMATIC (IMPLANTABLE) CARDIAC DEFIBRILLATOR: Chronic | ICD-10-CM

## 2024-06-30 DIAGNOSIS — D72.829 ELEVATED WHITE BLOOD CELL COUNT, UNSPECIFIED: ICD-10-CM

## 2024-06-30 DIAGNOSIS — I50.23 ACUTE ON CHRONIC SYSTOLIC (CONGESTIVE) HEART FAILURE: ICD-10-CM

## 2024-06-30 DIAGNOSIS — N17.9 ACUTE KIDNEY FAILURE, UNSPECIFIED: ICD-10-CM

## 2024-06-30 DIAGNOSIS — I25.10 ATHEROSCLEROTIC HEART DISEASE OF NATIVE CORONARY ARTERY WITHOUT ANGINA PECTORIS: ICD-10-CM

## 2024-06-30 DIAGNOSIS — E11.9 TYPE 2 DIABETES MELLITUS WITHOUT COMPLICATIONS: ICD-10-CM

## 2024-06-30 LAB
A1C WITH ESTIMATED AVERAGE GLUCOSE RESULT: 11.1 % — HIGH (ref 4–5.6)
ALBUMIN SERPL ELPH-MCNC: 3.8 G/DL — SIGNIFICANT CHANGE UP (ref 3.3–5)
ALP SERPL-CCNC: 163 U/L — HIGH (ref 40–120)
ALT FLD-CCNC: 31 U/L — SIGNIFICANT CHANGE UP (ref 10–45)
ANION GAP SERPL CALC-SCNC: 12 MMOL/L — SIGNIFICANT CHANGE UP (ref 5–17)
ANION GAP SERPL CALC-SCNC: 12 MMOL/L — SIGNIFICANT CHANGE UP (ref 5–17)
AST SERPL-CCNC: SIGNIFICANT CHANGE UP U/L (ref 10–40)
BASOPHILS # BLD AUTO: 0.08 K/UL — SIGNIFICANT CHANGE UP (ref 0–0.2)
BASOPHILS NFR BLD AUTO: 0.6 % — SIGNIFICANT CHANGE UP (ref 0–2)
BILIRUB SERPL-MCNC: 0.6 MG/DL — SIGNIFICANT CHANGE UP (ref 0.2–1.2)
BUN SERPL-MCNC: 24 MG/DL — HIGH (ref 7–23)
BUN SERPL-MCNC: 31 MG/DL — HIGH (ref 7–23)
CALCIUM SERPL-MCNC: 9.1 MG/DL — SIGNIFICANT CHANGE UP (ref 8.4–10.5)
CALCIUM SERPL-MCNC: 9.4 MG/DL — SIGNIFICANT CHANGE UP (ref 8.4–10.5)
CHLORIDE SERPL-SCNC: 96 MMOL/L — SIGNIFICANT CHANGE UP (ref 96–108)
CHLORIDE SERPL-SCNC: 99 MMOL/L — SIGNIFICANT CHANGE UP (ref 96–108)
CHOLEST SERPL-MCNC: 162 MG/DL — SIGNIFICANT CHANGE UP
CO2 SERPL-SCNC: 24 MMOL/L — SIGNIFICANT CHANGE UP (ref 22–31)
CO2 SERPL-SCNC: 26 MMOL/L — SIGNIFICANT CHANGE UP (ref 22–31)
CREAT SERPL-MCNC: 1.53 MG/DL — HIGH (ref 0.5–1.3)
CREAT SERPL-MCNC: 1.82 MG/DL — HIGH (ref 0.5–1.3)
EGFR: 41 ML/MIN/1.73M2 — LOW
EGFR: 51 ML/MIN/1.73M2 — LOW
EOSINOPHIL # BLD AUTO: 0.38 K/UL — SIGNIFICANT CHANGE UP (ref 0–0.5)
EOSINOPHIL NFR BLD AUTO: 3 % — SIGNIFICANT CHANGE UP (ref 0–6)
ESTIMATED AVERAGE GLUCOSE: 272 MG/DL — HIGH (ref 68–114)
GLUCOSE BLDC GLUCOMTR-MCNC: 214 MG/DL — HIGH (ref 70–99)
GLUCOSE BLDC GLUCOMTR-MCNC: 224 MG/DL — HIGH (ref 70–99)
GLUCOSE BLDC GLUCOMTR-MCNC: 224 MG/DL — HIGH (ref 70–99)
GLUCOSE BLDC GLUCOMTR-MCNC: 292 MG/DL — HIGH (ref 70–99)
GLUCOSE SERPL-MCNC: 241 MG/DL — HIGH (ref 70–99)
GLUCOSE SERPL-MCNC: 260 MG/DL — HIGH (ref 70–99)
HCT VFR BLD CALC: 45.9 % — SIGNIFICANT CHANGE UP (ref 39–50)
HDLC SERPL-MCNC: 28 MG/DL — LOW
HGB BLD-MCNC: 15 G/DL — SIGNIFICANT CHANGE UP (ref 13–17)
IMM GRANULOCYTES NFR BLD AUTO: 0.3 % — SIGNIFICANT CHANGE UP (ref 0–0.9)
LIPID PNL WITH DIRECT LDL SERPL: 119 MG/DL — HIGH
LYMPHOCYTES # BLD AUTO: 1.61 K/UL — SIGNIFICANT CHANGE UP (ref 1–3.3)
LYMPHOCYTES # BLD AUTO: 12.6 % — LOW (ref 13–44)
MAGNESIUM SERPL-MCNC: 2.1 MG/DL — SIGNIFICANT CHANGE UP (ref 1.6–2.6)
MAGNESIUM SERPL-MCNC: 2.2 MG/DL — SIGNIFICANT CHANGE UP (ref 1.6–2.6)
MCHC RBC-ENTMCNC: 29.5 PG — SIGNIFICANT CHANGE UP (ref 27–34)
MCHC RBC-ENTMCNC: 32.7 GM/DL — SIGNIFICANT CHANGE UP (ref 32–36)
MCV RBC AUTO: 90.4 FL — SIGNIFICANT CHANGE UP (ref 80–100)
MONOCYTES # BLD AUTO: 1.07 K/UL — HIGH (ref 0–0.9)
MONOCYTES NFR BLD AUTO: 8.4 % — SIGNIFICANT CHANGE UP (ref 2–14)
NEUTROPHILS # BLD AUTO: 9.61 K/UL — HIGH (ref 1.8–7.4)
NEUTROPHILS NFR BLD AUTO: 75.1 % — SIGNIFICANT CHANGE UP (ref 43–77)
NON HDL CHOLESTEROL: 134 MG/DL — HIGH
NRBC # BLD: 0 /100 WBCS — SIGNIFICANT CHANGE UP (ref 0–0)
PLATELET # BLD AUTO: 391 K/UL — SIGNIFICANT CHANGE UP (ref 150–400)
POTASSIUM SERPL-MCNC: 4.3 MMOL/L — SIGNIFICANT CHANGE UP (ref 3.5–5.3)
POTASSIUM SERPL-MCNC: 4.3 MMOL/L — SIGNIFICANT CHANGE UP (ref 3.5–5.3)
POTASSIUM SERPL-SCNC: 4.3 MMOL/L — SIGNIFICANT CHANGE UP (ref 3.5–5.3)
POTASSIUM SERPL-SCNC: 4.3 MMOL/L — SIGNIFICANT CHANGE UP (ref 3.5–5.3)
PROT SERPL-MCNC: 7.2 G/DL — SIGNIFICANT CHANGE UP (ref 6–8.3)
RAPID RVP RESULT: SIGNIFICANT CHANGE UP
RBC # BLD: 5.08 M/UL — SIGNIFICANT CHANGE UP (ref 4.2–5.8)
RBC # FLD: 12.9 % — SIGNIFICANT CHANGE UP (ref 10.3–14.5)
SARS-COV-2 RNA SPEC QL NAA+PROBE: SIGNIFICANT CHANGE UP
SODIUM SERPL-SCNC: 134 MMOL/L — LOW (ref 135–145)
SODIUM SERPL-SCNC: 135 MMOL/L — SIGNIFICANT CHANGE UP (ref 135–145)
T4 AB SER-ACNC: 8.73 UG/DL — SIGNIFICANT CHANGE UP (ref 4.5–11.7)
TRIGL SERPL-MCNC: 77 MG/DL — SIGNIFICANT CHANGE UP
TSH SERPL-MCNC: 0.36 UIU/ML — SIGNIFICANT CHANGE UP (ref 0.27–4.2)
WBC # BLD: 12.79 K/UL — HIGH (ref 3.8–10.5)
WBC # FLD AUTO: 12.79 K/UL — HIGH (ref 3.8–10.5)

## 2024-06-30 PROCEDURE — 71045 X-RAY EXAM CHEST 1 VIEW: CPT | Mod: 26

## 2024-06-30 PROCEDURE — 99223 1ST HOSP IP/OBS HIGH 75: CPT

## 2024-06-30 PROCEDURE — 93312 ECHO TRANSESOPHAGEAL: CPT | Mod: 26,1L,52

## 2024-06-30 RX ORDER — INSULIN LISPRO 100 [IU]/ML
INJECTION, SOLUTION SUBCUTANEOUS
Refills: 0 | Status: DISCONTINUED | OUTPATIENT
Start: 2024-06-30 | End: 2024-07-03

## 2024-06-30 RX ORDER — DEXTROSE 30 % IN WATER 30 %
12.5 VIAL (ML) INTRAVENOUS ONCE
Refills: 0 | Status: DISCONTINUED | OUTPATIENT
Start: 2024-06-30 | End: 2024-07-03

## 2024-06-30 RX ORDER — GLUCAGON HYDROCHLORIDE 1 MG/ML
1 INJECTION, POWDER, FOR SOLUTION INTRAMUSCULAR; INTRAVENOUS; SUBCUTANEOUS ONCE
Refills: 0 | Status: DISCONTINUED | OUTPATIENT
Start: 2024-06-30 | End: 2024-07-03

## 2024-06-30 RX ORDER — INSULIN DEGLUDEC 100 U/ML
25 INJECTION, SOLUTION SUBCUTANEOUS
Qty: 0 | Refills: 0 | DISCHARGE

## 2024-06-30 RX ORDER — INSULIN GLARGINE 100 [IU]/ML
20 INJECTION, SOLUTION SUBCUTANEOUS AT BEDTIME
Refills: 0 | Status: DISCONTINUED | OUTPATIENT
Start: 2024-06-30 | End: 2024-07-02

## 2024-06-30 RX ORDER — FUROSEMIDE 10 MG/ML
20 INJECTION, SOLUTION INTRAMUSCULAR; INTRAVENOUS ONCE
Refills: 0 | Status: COMPLETED | OUTPATIENT
Start: 2024-06-30 | End: 2024-06-30

## 2024-06-30 RX ORDER — CLOPIDOGREL BISULFATE 75 MG/1
1 TABLET, FILM COATED ORAL
Refills: 0 | DISCHARGE

## 2024-06-30 RX ORDER — DEXTROSE MONOHYDRATE 100 MG/ML
125 INJECTION, SOLUTION INTRAVENOUS ONCE
Refills: 0 | Status: DISCONTINUED | OUTPATIENT
Start: 2024-06-30 | End: 2024-07-03

## 2024-06-30 RX ORDER — ATORVASTATIN CALCIUM 20 MG/1
40 TABLET, FILM COATED ORAL AT BEDTIME
Refills: 0 | Status: DISCONTINUED | OUTPATIENT
Start: 2024-06-30 | End: 2024-07-03

## 2024-06-30 RX ORDER — CLOPIDOGREL BISULFATE 75 MG/1
75 TABLET, FILM COATED ORAL DAILY
Refills: 0 | Status: DISCONTINUED | OUTPATIENT
Start: 2024-06-30 | End: 2024-07-03

## 2024-06-30 RX ORDER — HYDRALAZINE HYDROCHLORIDE 50 MG/1
37.5 TABLET ORAL THREE TIMES A DAY
Refills: 0 | Status: DISCONTINUED | OUTPATIENT
Start: 2024-06-30 | End: 2024-07-02

## 2024-06-30 RX ORDER — DEXTROSE MONOHYDRATE AND SODIUM CHLORIDE 5; .3 G/100ML; G/100ML
1000 INJECTION, SOLUTION INTRAVENOUS
Refills: 0 | Status: DISCONTINUED | OUTPATIENT
Start: 2024-06-30 | End: 2024-07-03

## 2024-06-30 RX ORDER — ASPIRIN 325 MG/1
81 TABLET, FILM COATED ORAL DAILY
Refills: 0 | Status: DISCONTINUED | OUTPATIENT
Start: 2024-06-30 | End: 2024-07-03

## 2024-06-30 RX ORDER — DEXTROSE 30 % IN WATER 30 %
15 VIAL (ML) INTRAVENOUS ONCE
Refills: 0 | Status: DISCONTINUED | OUTPATIENT
Start: 2024-06-30 | End: 2024-07-03

## 2024-06-30 RX ORDER — ASPIRIN/CALCIUM CARB/MAGNESIUM 324 MG
1 TABLET ORAL
Qty: 0 | Refills: 0 | DISCHARGE

## 2024-06-30 RX ORDER — NITROGLYCERIN 2.5 MG
0.4 CAPSULE, EXTENDED RELEASE ORAL ONCE
Refills: 0 | Status: COMPLETED | OUTPATIENT
Start: 2024-06-30 | End: 2024-06-30

## 2024-06-30 RX ORDER — ALBUTEROL 90 MCG
1 AEROSOL REFILL (GRAM) INHALATION
Refills: 0 | Status: DISCONTINUED | OUTPATIENT
Start: 2024-06-30 | End: 2024-07-03

## 2024-06-30 RX ORDER — HYDRALAZINE HYDROCHLORIDE 50 MG/1
10 TABLET ORAL EVERY 8 HOURS
Refills: 0 | Status: DISCONTINUED | OUTPATIENT
Start: 2024-06-30 | End: 2024-06-30

## 2024-06-30 RX ORDER — DEXTROSE 30 % IN WATER 30 %
25 VIAL (ML) INTRAVENOUS ONCE
Refills: 0 | Status: DISCONTINUED | OUTPATIENT
Start: 2024-06-30 | End: 2024-07-03

## 2024-06-30 RX ORDER — HEPARIN SODIUM 50 [USP'U]/ML
5000 INJECTION, SOLUTION INTRAVENOUS EVERY 8 HOURS
Refills: 0 | Status: DISCONTINUED | OUTPATIENT
Start: 2024-06-30 | End: 2024-07-03

## 2024-06-30 RX ORDER — FUROSEMIDE 10 MG/ML
40 INJECTION, SOLUTION INTRAMUSCULAR; INTRAVENOUS
Refills: 0 | Status: DISCONTINUED | OUTPATIENT
Start: 2024-06-30 | End: 2024-07-02

## 2024-06-30 RX ADMIN — HYDRALAZINE HYDROCHLORIDE 37.5 MILLIGRAM(S): 50 TABLET ORAL at 21:46

## 2024-06-30 RX ADMIN — INSULIN GLARGINE 20 UNIT(S): 100 INJECTION, SOLUTION SUBCUTANEOUS at 21:46

## 2024-06-30 RX ADMIN — FUROSEMIDE 20 MILLIGRAM(S): 10 INJECTION, SOLUTION INTRAMUSCULAR; INTRAVENOUS at 00:20

## 2024-06-30 RX ADMIN — INSULIN LISPRO 4: 100 INJECTION, SOLUTION SUBCUTANEOUS at 17:53

## 2024-06-30 RX ADMIN — CLOPIDOGREL BISULFATE 75 MILLIGRAM(S): 75 TABLET, FILM COATED ORAL at 11:21

## 2024-06-30 RX ADMIN — FUROSEMIDE 20 MILLIGRAM(S): 10 INJECTION, SOLUTION INTRAMUSCULAR; INTRAVENOUS at 01:30

## 2024-06-30 RX ADMIN — INSULIN LISPRO 4: 100 INJECTION, SOLUTION SUBCUTANEOUS at 08:25

## 2024-06-30 RX ADMIN — HEPARIN SODIUM 5000 UNIT(S): 50 INJECTION, SOLUTION INTRAVENOUS at 14:14

## 2024-06-30 RX ADMIN — ASPIRIN 81 MILLIGRAM(S): 325 TABLET, FILM COATED ORAL at 11:22

## 2024-06-30 RX ADMIN — ATORVASTATIN CALCIUM 40 MILLIGRAM(S): 20 TABLET, FILM COATED ORAL at 21:47

## 2024-06-30 RX ADMIN — Medication 0.4 MILLIGRAM(S): at 03:33

## 2024-06-30 RX ADMIN — INSULIN LISPRO 6: 100 INJECTION, SOLUTION SUBCUTANEOUS at 12:18

## 2024-06-30 RX ADMIN — HEPARIN SODIUM 5000 UNIT(S): 50 INJECTION, SOLUTION INTRAVENOUS at 05:52

## 2024-06-30 RX ADMIN — HEPARIN SODIUM 5000 UNIT(S): 50 INJECTION, SOLUTION INTRAVENOUS at 21:47

## 2024-06-30 RX ADMIN — FUROSEMIDE 40 MILLIGRAM(S): 10 INJECTION, SOLUTION INTRAMUSCULAR; INTRAVENOUS at 14:14

## 2024-06-30 RX ADMIN — FUROSEMIDE 40 MILLIGRAM(S): 10 INJECTION, SOLUTION INTRAMUSCULAR; INTRAVENOUS at 11:18

## 2024-07-01 ENCOUNTER — RESULT REVIEW (OUTPATIENT)
Age: 64
End: 2024-07-01

## 2024-07-01 LAB
ALBUMIN SERPL ELPH-MCNC: 3.6 G/DL — SIGNIFICANT CHANGE UP (ref 3.3–5)
ALP SERPL-CCNC: 148 U/L — HIGH (ref 40–120)
ALT FLD-CCNC: 22 U/L — SIGNIFICANT CHANGE UP (ref 10–45)
ANION GAP SERPL CALC-SCNC: 11 MMOL/L — SIGNIFICANT CHANGE UP (ref 5–17)
AST SERPL-CCNC: 13 U/L — SIGNIFICANT CHANGE UP (ref 10–40)
BASOPHILS # BLD AUTO: 0.07 K/UL — SIGNIFICANT CHANGE UP (ref 0–0.2)
BASOPHILS NFR BLD AUTO: 0.6 % — SIGNIFICANT CHANGE UP (ref 0–2)
BILIRUB SERPL-MCNC: 0.6 MG/DL — SIGNIFICANT CHANGE UP (ref 0.2–1.2)
BUN SERPL-MCNC: 29 MG/DL — HIGH (ref 7–23)
CALCIUM SERPL-MCNC: 9.3 MG/DL — SIGNIFICANT CHANGE UP (ref 8.4–10.5)
CHLORIDE SERPL-SCNC: 95 MMOL/L — LOW (ref 96–108)
CO2 SERPL-SCNC: 27 MMOL/L — SIGNIFICANT CHANGE UP (ref 22–31)
CREAT SERPL-MCNC: 1.49 MG/DL — HIGH (ref 0.5–1.3)
EGFR: 53 ML/MIN/1.73M2 — LOW
EOSINOPHIL # BLD AUTO: 0.36 K/UL — SIGNIFICANT CHANGE UP (ref 0–0.5)
EOSINOPHIL NFR BLD AUTO: 3 % — SIGNIFICANT CHANGE UP (ref 0–6)
GLUCOSE BLDC GLUCOMTR-MCNC: 174 MG/DL — HIGH (ref 70–99)
GLUCOSE BLDC GLUCOMTR-MCNC: 206 MG/DL — HIGH (ref 70–99)
GLUCOSE BLDC GLUCOMTR-MCNC: 232 MG/DL — HIGH (ref 70–99)
GLUCOSE BLDC GLUCOMTR-MCNC: 262 MG/DL — HIGH (ref 70–99)
GLUCOSE SERPL-MCNC: 345 MG/DL — HIGH (ref 70–99)
HCT VFR BLD CALC: 43.6 % — SIGNIFICANT CHANGE UP (ref 39–50)
HGB BLD-MCNC: 15.4 G/DL — SIGNIFICANT CHANGE UP (ref 13–17)
IMM GRANULOCYTES NFR BLD AUTO: 0.5 % — SIGNIFICANT CHANGE UP (ref 0–0.9)
LYMPHOCYTES # BLD AUTO: 1.3 K/UL — SIGNIFICANT CHANGE UP (ref 1–3.3)
LYMPHOCYTES # BLD AUTO: 10.8 % — LOW (ref 13–44)
MAGNESIUM SERPL-MCNC: 2 MG/DL — SIGNIFICANT CHANGE UP (ref 1.6–2.6)
MCHC RBC-ENTMCNC: 30.4 PG — SIGNIFICANT CHANGE UP (ref 27–34)
MCHC RBC-ENTMCNC: 35.3 GM/DL — SIGNIFICANT CHANGE UP (ref 32–36)
MCV RBC AUTO: 86 FL — SIGNIFICANT CHANGE UP (ref 80–100)
MONOCYTES # BLD AUTO: 0.92 K/UL — HIGH (ref 0–0.9)
MONOCYTES NFR BLD AUTO: 7.6 % — SIGNIFICANT CHANGE UP (ref 2–14)
NEUTROPHILS # BLD AUTO: 9.32 K/UL — HIGH (ref 1.8–7.4)
NEUTROPHILS NFR BLD AUTO: 77.5 % — HIGH (ref 43–77)
NRBC # BLD: 0 /100 WBCS — SIGNIFICANT CHANGE UP (ref 0–0)
PLATELET # BLD AUTO: 384 K/UL — SIGNIFICANT CHANGE UP (ref 150–400)
POTASSIUM SERPL-MCNC: 4.5 MMOL/L — SIGNIFICANT CHANGE UP (ref 3.5–5.3)
POTASSIUM SERPL-SCNC: 4.5 MMOL/L — SIGNIFICANT CHANGE UP (ref 3.5–5.3)
PROT SERPL-MCNC: 7.1 G/DL — SIGNIFICANT CHANGE UP (ref 6–8.3)
RBC # BLD: 5.07 M/UL — SIGNIFICANT CHANGE UP (ref 4.2–5.8)
RBC # FLD: 13.1 % — SIGNIFICANT CHANGE UP (ref 10.3–14.5)
SODIUM SERPL-SCNC: 133 MMOL/L — LOW (ref 135–145)
WBC # BLD: 12.03 K/UL — HIGH (ref 3.8–10.5)
WBC # FLD AUTO: 12.03 K/UL — HIGH (ref 3.8–10.5)

## 2024-07-01 PROCEDURE — 71275 CT ANGIOGRAPHY CHEST: CPT | Mod: 26

## 2024-07-01 PROCEDURE — 99222 1ST HOSP IP/OBS MODERATE 55: CPT

## 2024-07-01 PROCEDURE — 93306 TTE W/DOPPLER COMPLETE: CPT | Mod: 26

## 2024-07-01 PROCEDURE — 99232 SBSQ HOSP IP/OBS MODERATE 35: CPT

## 2024-07-01 RX ORDER — INSULIN LISPRO 100 [IU]/ML
10 INJECTION, SOLUTION SUBCUTANEOUS
Refills: 0 | Status: DISCONTINUED | OUTPATIENT
Start: 2024-07-01 | End: 2024-07-03

## 2024-07-01 RX ADMIN — HYDRALAZINE HYDROCHLORIDE 37.5 MILLIGRAM(S): 50 TABLET ORAL at 22:19

## 2024-07-01 RX ADMIN — FUROSEMIDE 40 MILLIGRAM(S): 10 INJECTION, SOLUTION INTRAMUSCULAR; INTRAVENOUS at 18:30

## 2024-07-01 RX ADMIN — HEPARIN SODIUM 5000 UNIT(S): 50 INJECTION, SOLUTION INTRAVENOUS at 22:18

## 2024-07-01 RX ADMIN — INSULIN LISPRO 2: 100 INJECTION, SOLUTION SUBCUTANEOUS at 17:43

## 2024-07-01 RX ADMIN — ATORVASTATIN CALCIUM 40 MILLIGRAM(S): 20 TABLET, FILM COATED ORAL at 22:19

## 2024-07-01 RX ADMIN — ASPIRIN 81 MILLIGRAM(S): 325 TABLET, FILM COATED ORAL at 11:34

## 2024-07-01 RX ADMIN — HYDRALAZINE HYDROCHLORIDE 37.5 MILLIGRAM(S): 50 TABLET ORAL at 14:38

## 2024-07-01 RX ADMIN — INSULIN LISPRO 4: 100 INJECTION, SOLUTION SUBCUTANEOUS at 08:12

## 2024-07-01 RX ADMIN — INSULIN LISPRO 4: 100 INJECTION, SOLUTION SUBCUTANEOUS at 22:18

## 2024-07-01 RX ADMIN — CLOPIDOGREL BISULFATE 75 MILLIGRAM(S): 75 TABLET, FILM COATED ORAL at 11:34

## 2024-07-01 RX ADMIN — HEPARIN SODIUM 5000 UNIT(S): 50 INJECTION, SOLUTION INTRAVENOUS at 14:38

## 2024-07-01 RX ADMIN — HYDRALAZINE HYDROCHLORIDE 37.5 MILLIGRAM(S): 50 TABLET ORAL at 06:49

## 2024-07-01 RX ADMIN — FUROSEMIDE 40 MILLIGRAM(S): 10 INJECTION, SOLUTION INTRAMUSCULAR; INTRAVENOUS at 05:53

## 2024-07-01 RX ADMIN — INSULIN LISPRO 6: 100 INJECTION, SOLUTION SUBCUTANEOUS at 11:35

## 2024-07-01 RX ADMIN — INSULIN GLARGINE 20 UNIT(S): 100 INJECTION, SOLUTION SUBCUTANEOUS at 22:19

## 2024-07-01 RX ADMIN — HEPARIN SODIUM 5000 UNIT(S): 50 INJECTION, SOLUTION INTRAVENOUS at 05:53

## 2024-07-02 ENCOUNTER — TRANSCRIPTION ENCOUNTER (OUTPATIENT)
Age: 64
End: 2024-07-02

## 2024-07-02 LAB
ALBUMIN SERPL ELPH-MCNC: 3.8 G/DL — SIGNIFICANT CHANGE UP (ref 3.3–5)
ALP SERPL-CCNC: 140 U/L — HIGH (ref 40–120)
ALT FLD-CCNC: 17 U/L — SIGNIFICANT CHANGE UP (ref 10–45)
ANION GAP SERPL CALC-SCNC: 11 MMOL/L — SIGNIFICANT CHANGE UP (ref 5–17)
AST SERPL-CCNC: 11 U/L — SIGNIFICANT CHANGE UP (ref 10–40)
BILIRUB SERPL-MCNC: 0.5 MG/DL — SIGNIFICANT CHANGE UP (ref 0.2–1.2)
BUN SERPL-MCNC: 29 MG/DL — HIGH (ref 7–23)
C PEPTIDE SERPL-MCNC: 1.9 NG/ML — SIGNIFICANT CHANGE UP (ref 1.1–4.4)
CALCIUM SERPL-MCNC: 9.3 MG/DL — SIGNIFICANT CHANGE UP (ref 8.4–10.5)
CHLORIDE SERPL-SCNC: 97 MMOL/L — SIGNIFICANT CHANGE UP (ref 96–108)
CO2 SERPL-SCNC: 26 MMOL/L — SIGNIFICANT CHANGE UP (ref 22–31)
CREAT SERPL-MCNC: 1.47 MG/DL — HIGH (ref 0.5–1.3)
EGFR: 54 ML/MIN/1.73M2 — LOW
GLUCOSE BLDC GLUCOMTR-MCNC: 111 MG/DL — HIGH (ref 70–99)
GLUCOSE BLDC GLUCOMTR-MCNC: 148 MG/DL — HIGH (ref 70–99)
GLUCOSE BLDC GLUCOMTR-MCNC: 206 MG/DL — HIGH (ref 70–99)
GLUCOSE BLDC GLUCOMTR-MCNC: 220 MG/DL — HIGH (ref 70–99)
GLUCOSE SERPL-MCNC: 201 MG/DL — HIGH (ref 70–99)
HCT VFR BLD CALC: 45.4 % — SIGNIFICANT CHANGE UP (ref 39–50)
HGB BLD-MCNC: 15.4 G/DL — SIGNIFICANT CHANGE UP (ref 13–17)
MAGNESIUM SERPL-MCNC: 2.3 MG/DL — SIGNIFICANT CHANGE UP (ref 1.6–2.6)
MCHC RBC-ENTMCNC: 29.4 PG — SIGNIFICANT CHANGE UP (ref 27–34)
MCHC RBC-ENTMCNC: 33.9 GM/DL — SIGNIFICANT CHANGE UP (ref 32–36)
MCV RBC AUTO: 86.6 FL — SIGNIFICANT CHANGE UP (ref 80–100)
NRBC # BLD: 0 /100 WBCS — SIGNIFICANT CHANGE UP (ref 0–0)
PLATELET # BLD AUTO: 389 K/UL — SIGNIFICANT CHANGE UP (ref 150–400)
POTASSIUM SERPL-MCNC: 4.6 MMOL/L — SIGNIFICANT CHANGE UP (ref 3.5–5.3)
POTASSIUM SERPL-SCNC: 4.6 MMOL/L — SIGNIFICANT CHANGE UP (ref 3.5–5.3)
PROT SERPL-MCNC: 7.2 G/DL — SIGNIFICANT CHANGE UP (ref 6–8.3)
RBC # BLD: 5.24 M/UL — SIGNIFICANT CHANGE UP (ref 4.2–5.8)
RBC # FLD: 12.6 % — SIGNIFICANT CHANGE UP (ref 10.3–14.5)
SODIUM SERPL-SCNC: 134 MMOL/L — LOW (ref 135–145)
WBC # BLD: 10.63 K/UL — HIGH (ref 3.8–10.5)
WBC # FLD AUTO: 10.63 K/UL — HIGH (ref 3.8–10.5)

## 2024-07-02 PROCEDURE — 99232 SBSQ HOSP IP/OBS MODERATE 35: CPT

## 2024-07-02 RX ORDER — TIRZEPATIDE 12.5 MG/.5ML
2.5 INJECTION, SOLUTION SUBCUTANEOUS
Qty: 4 | Refills: 0
Start: 2024-07-02

## 2024-07-02 RX ORDER — FUROSEMIDE 10 MG/ML
40 INJECTION, SOLUTION INTRAMUSCULAR; INTRAVENOUS DAILY
Refills: 0 | Status: DISCONTINUED | OUTPATIENT
Start: 2024-07-02 | End: 2024-07-03

## 2024-07-02 RX ORDER — INSULIN GLARGINE 100 [IU]/ML
30 INJECTION, SOLUTION SUBCUTANEOUS AT BEDTIME
Refills: 0 | Status: DISCONTINUED | OUTPATIENT
Start: 2024-07-02 | End: 2024-07-03

## 2024-07-02 RX ORDER — LOSARTAN POTASSIUM 100 MG/1
25 TABLET, FILM COATED ORAL DAILY
Refills: 0 | Status: DISCONTINUED | OUTPATIENT
Start: 2024-07-02 | End: 2024-07-03

## 2024-07-02 RX ORDER — ISOPROPYL ALCOHOL, BENZOCAINE .7; .06 ML/ML; ML/ML
0 SWAB TOPICAL
Qty: 100 | Refills: 1
Start: 2024-07-02

## 2024-07-02 RX ORDER — METOPROLOL TARTRATE 50 MG
25 TABLET ORAL DAILY
Refills: 0 | Status: DISCONTINUED | OUTPATIENT
Start: 2024-07-02 | End: 2024-07-03

## 2024-07-02 RX ADMIN — LOSARTAN POTASSIUM 25 MILLIGRAM(S): 100 TABLET, FILM COATED ORAL at 14:32

## 2024-07-02 RX ADMIN — INSULIN LISPRO 4: 100 INJECTION, SOLUTION SUBCUTANEOUS at 22:30

## 2024-07-02 RX ADMIN — INSULIN LISPRO 10 UNIT(S): 100 INJECTION, SOLUTION SUBCUTANEOUS at 08:34

## 2024-07-02 RX ADMIN — INSULIN LISPRO 4: 100 INJECTION, SOLUTION SUBCUTANEOUS at 08:33

## 2024-07-02 RX ADMIN — HEPARIN SODIUM 5000 UNIT(S): 50 INJECTION, SOLUTION INTRAVENOUS at 06:17

## 2024-07-02 RX ADMIN — CLOPIDOGREL BISULFATE 75 MILLIGRAM(S): 75 TABLET, FILM COATED ORAL at 11:40

## 2024-07-02 RX ADMIN — FUROSEMIDE 40 MILLIGRAM(S): 10 INJECTION, SOLUTION INTRAMUSCULAR; INTRAVENOUS at 14:33

## 2024-07-02 RX ADMIN — HYDRALAZINE HYDROCHLORIDE 37.5 MILLIGRAM(S): 50 TABLET ORAL at 06:16

## 2024-07-02 RX ADMIN — INSULIN LISPRO 10 UNIT(S): 100 INJECTION, SOLUTION SUBCUTANEOUS at 17:25

## 2024-07-02 RX ADMIN — Medication 25 MILLIGRAM(S): at 11:40

## 2024-07-02 RX ADMIN — INSULIN LISPRO 10 UNIT(S): 100 INJECTION, SOLUTION SUBCUTANEOUS at 12:30

## 2024-07-02 RX ADMIN — ASPIRIN 81 MILLIGRAM(S): 325 TABLET, FILM COATED ORAL at 11:40

## 2024-07-02 RX ADMIN — ATORVASTATIN CALCIUM 40 MILLIGRAM(S): 20 TABLET, FILM COATED ORAL at 22:30

## 2024-07-02 RX ADMIN — INSULIN GLARGINE 30 UNIT(S): 100 INJECTION, SOLUTION SUBCUTANEOUS at 22:31

## 2024-07-02 RX ADMIN — HEPARIN SODIUM 5000 UNIT(S): 50 INJECTION, SOLUTION INTRAVENOUS at 22:30

## 2024-07-02 RX ADMIN — HEPARIN SODIUM 5000 UNIT(S): 50 INJECTION, SOLUTION INTRAVENOUS at 14:32

## 2024-07-02 RX ADMIN — FUROSEMIDE 40 MILLIGRAM(S): 10 INJECTION, SOLUTION INTRAMUSCULAR; INTRAVENOUS at 06:16

## 2024-07-03 ENCOUNTER — TRANSCRIPTION ENCOUNTER (OUTPATIENT)
Age: 64
End: 2024-07-03

## 2024-07-03 VITALS
DIASTOLIC BLOOD PRESSURE: 72 MMHG | RESPIRATION RATE: 18 BRPM | OXYGEN SATURATION: 95 % | SYSTOLIC BLOOD PRESSURE: 111 MMHG | TEMPERATURE: 98 F | HEART RATE: 101 BPM

## 2024-07-03 PROBLEM — J44.9 CHRONIC OBSTRUCTIVE PULMONARY DISEASE, UNSPECIFIED: Chronic | Status: ACTIVE | Noted: 2024-06-30

## 2024-07-03 PROBLEM — I50.20 UNSPECIFIED SYSTOLIC (CONGESTIVE) HEART FAILURE: Chronic | Status: ACTIVE | Noted: 2024-06-30

## 2024-07-03 PROBLEM — Z87.891 PERSONAL HISTORY OF NICOTINE DEPENDENCE: Chronic | Status: ACTIVE | Noted: 2024-06-30

## 2024-07-03 LAB
ALBUMIN SERPL ELPH-MCNC: 3.5 G/DL — SIGNIFICANT CHANGE UP (ref 3.3–5)
ALP SERPL-CCNC: 128 U/L — HIGH (ref 40–120)
ALT FLD-CCNC: 14 U/L — SIGNIFICANT CHANGE UP (ref 10–45)
ANION GAP SERPL CALC-SCNC: 12 MMOL/L — SIGNIFICANT CHANGE UP (ref 5–17)
AST SERPL-CCNC: 14 U/L — SIGNIFICANT CHANGE UP (ref 10–40)
BASOPHILS # BLD AUTO: 0.08 K/UL — SIGNIFICANT CHANGE UP (ref 0–0.2)
BASOPHILS NFR BLD AUTO: 0.7 % — SIGNIFICANT CHANGE UP (ref 0–2)
BILIRUB SERPL-MCNC: 0.5 MG/DL — SIGNIFICANT CHANGE UP (ref 0.2–1.2)
BUN SERPL-MCNC: 31 MG/DL — HIGH (ref 7–23)
CALCIUM SERPL-MCNC: 9.3 MG/DL — SIGNIFICANT CHANGE UP (ref 8.4–10.5)
CHLORIDE SERPL-SCNC: 97 MMOL/L — SIGNIFICANT CHANGE UP (ref 96–108)
CO2 SERPL-SCNC: 25 MMOL/L — SIGNIFICANT CHANGE UP (ref 22–31)
CREAT SERPL-MCNC: 1.52 MG/DL — HIGH (ref 0.5–1.3)
EGFR: 51 ML/MIN/1.73M2 — LOW
EOSINOPHIL # BLD AUTO: 0.3 K/UL — SIGNIFICANT CHANGE UP (ref 0–0.5)
EOSINOPHIL NFR BLD AUTO: 2.8 % — SIGNIFICANT CHANGE UP (ref 0–6)
GLUCOSE BLDC GLUCOMTR-MCNC: 156 MG/DL — HIGH (ref 70–99)
GLUCOSE BLDC GLUCOMTR-MCNC: 254 MG/DL — HIGH (ref 70–99)
GLUCOSE SERPL-MCNC: 137 MG/DL — HIGH (ref 70–99)
HCT VFR BLD CALC: 44.1 % — SIGNIFICANT CHANGE UP (ref 39–50)
HGB BLD-MCNC: 15 G/DL — SIGNIFICANT CHANGE UP (ref 13–17)
IMM GRANULOCYTES NFR BLD AUTO: 0.5 % — SIGNIFICANT CHANGE UP (ref 0–0.9)
LYMPHOCYTES # BLD AUTO: 1.38 K/UL — SIGNIFICANT CHANGE UP (ref 1–3.3)
LYMPHOCYTES # BLD AUTO: 12.7 % — LOW (ref 13–44)
MAGNESIUM SERPL-MCNC: 2.2 MG/DL — SIGNIFICANT CHANGE UP (ref 1.6–2.6)
MCHC RBC-ENTMCNC: 29.7 PG — SIGNIFICANT CHANGE UP (ref 27–34)
MCHC RBC-ENTMCNC: 34 GM/DL — SIGNIFICANT CHANGE UP (ref 32–36)
MCV RBC AUTO: 87.3 FL — SIGNIFICANT CHANGE UP (ref 80–100)
MONOCYTES # BLD AUTO: 1.02 K/UL — HIGH (ref 0–0.9)
MONOCYTES NFR BLD AUTO: 9.4 % — SIGNIFICANT CHANGE UP (ref 2–14)
NEUTROPHILS # BLD AUTO: 8 K/UL — HIGH (ref 1.8–7.4)
NEUTROPHILS NFR BLD AUTO: 73.9 % — SIGNIFICANT CHANGE UP (ref 43–77)
NRBC # BLD: 0 /100 WBCS — SIGNIFICANT CHANGE UP (ref 0–0)
PLATELET # BLD AUTO: 390 K/UL — SIGNIFICANT CHANGE UP (ref 150–400)
POTASSIUM SERPL-MCNC: 4.4 MMOL/L — SIGNIFICANT CHANGE UP (ref 3.5–5.3)
POTASSIUM SERPL-SCNC: 4.4 MMOL/L — SIGNIFICANT CHANGE UP (ref 3.5–5.3)
PROT SERPL-MCNC: 7 G/DL — SIGNIFICANT CHANGE UP (ref 6–8.3)
RBC # BLD: 5.05 M/UL — SIGNIFICANT CHANGE UP (ref 4.2–5.8)
RBC # FLD: 13 % — SIGNIFICANT CHANGE UP (ref 10.3–14.5)
SODIUM SERPL-SCNC: 134 MMOL/L — LOW (ref 135–145)
WBC # BLD: 10.83 K/UL — HIGH (ref 3.8–10.5)
WBC # FLD AUTO: 10.83 K/UL — HIGH (ref 3.8–10.5)

## 2024-07-03 PROCEDURE — 99239 HOSP IP/OBS DSCHRG MGMT >30: CPT

## 2024-07-03 RX ORDER — DAPAGLIFLOZIN 10 MG/1
10 TABLET, FILM COATED ORAL DAILY
Refills: 0 | Status: DISCONTINUED | OUTPATIENT
Start: 2024-07-03 | End: 2024-07-03

## 2024-07-03 RX ORDER — DAPAGLIFLOZIN 10 MG/1
1 TABLET, FILM COATED ORAL
Qty: 30 | Refills: 1
Start: 2024-07-03 | End: 2024-08-31

## 2024-07-03 RX ORDER — INSULIN LISPRO 100 [IU]/ML
12 INJECTION, SOLUTION SUBCUTANEOUS
Qty: 1 | Refills: 1
Start: 2024-07-03 | End: 2024-08-31

## 2024-07-03 RX ORDER — METOPROLOL TARTRATE 50 MG
1 TABLET ORAL
Qty: 30 | Refills: 1
Start: 2024-07-03 | End: 2024-08-31

## 2024-07-03 RX ORDER — FUROSEMIDE 10 MG/ML
1 INJECTION, SOLUTION INTRAMUSCULAR; INTRAVENOUS
Qty: 30 | Refills: 1
Start: 2024-07-03 | End: 2024-08-31

## 2024-07-03 RX ORDER — ALBUTEROL 90 MCG
2 AEROSOL REFILL (GRAM) INHALATION
Qty: 0 | Refills: 0 | DISCHARGE

## 2024-07-03 RX ORDER — FUROSEMIDE 10 MG/ML
1 INJECTION, SOLUTION INTRAMUSCULAR; INTRAVENOUS
Qty: 30 | Refills: 0
Start: 2024-07-03 | End: 2024-08-01

## 2024-07-03 RX ORDER — INSULIN GLARGINE 100 [IU]/ML
30 INJECTION, SOLUTION SUBCUTANEOUS
Refills: 0 | DISCHARGE

## 2024-07-03 RX ORDER — INSULIN GLARGINE 100 [IU]/ML
28 INJECTION, SOLUTION SUBCUTANEOUS
Qty: 1 | Refills: 1
Start: 2024-07-03

## 2024-07-03 RX ORDER — GLIPIZIDE 5 MG
1 TABLET ORAL
Refills: 0 | DISCHARGE

## 2024-07-03 RX ORDER — LOSARTAN POTASSIUM 100 MG/1
1 TABLET, FILM COATED ORAL
Qty: 30 | Refills: 1
Start: 2024-07-03 | End: 2024-08-31

## 2024-07-03 RX ADMIN — LOSARTAN POTASSIUM 25 MILLIGRAM(S): 100 TABLET, FILM COATED ORAL at 06:31

## 2024-07-03 RX ADMIN — FUROSEMIDE 40 MILLIGRAM(S): 10 INJECTION, SOLUTION INTRAMUSCULAR; INTRAVENOUS at 06:31

## 2024-07-03 RX ADMIN — INSULIN LISPRO 6: 100 INJECTION, SOLUTION SUBCUTANEOUS at 12:30

## 2024-07-03 RX ADMIN — Medication 25 MILLIGRAM(S): at 06:32

## 2024-07-03 RX ADMIN — CLOPIDOGREL BISULFATE 75 MILLIGRAM(S): 75 TABLET, FILM COATED ORAL at 11:20

## 2024-07-03 RX ADMIN — HEPARIN SODIUM 5000 UNIT(S): 50 INJECTION, SOLUTION INTRAVENOUS at 06:31

## 2024-07-03 RX ADMIN — INSULIN LISPRO 10 UNIT(S): 100 INJECTION, SOLUTION SUBCUTANEOUS at 12:31

## 2024-07-03 RX ADMIN — ASPIRIN 81 MILLIGRAM(S): 325 TABLET, FILM COATED ORAL at 11:20

## 2024-07-03 RX ADMIN — DAPAGLIFLOZIN 10 MILLIGRAM(S): 10 TABLET, FILM COATED ORAL at 12:31

## 2024-07-03 RX ADMIN — INSULIN LISPRO 10 UNIT(S): 100 INJECTION, SOLUTION SUBCUTANEOUS at 08:15

## 2024-07-03 RX ADMIN — INSULIN LISPRO 2: 100 INJECTION, SOLUTION SUBCUTANEOUS at 08:14

## 2024-07-13 PROCEDURE — 80061 LIPID PANEL: CPT

## 2024-07-13 PROCEDURE — 80076 HEPATIC FUNCTION PANEL: CPT

## 2024-07-13 PROCEDURE — 83036 HEMOGLOBIN GLYCOSYLATED A1C: CPT

## 2024-07-13 PROCEDURE — 84484 ASSAY OF TROPONIN QUANT: CPT

## 2024-07-13 PROCEDURE — 80048 BASIC METABOLIC PNL TOTAL CA: CPT

## 2024-07-13 PROCEDURE — 0225U NFCT DS DNA&RNA 21 SARSCOV2: CPT

## 2024-07-13 PROCEDURE — 96375 TX/PRO/DX INJ NEW DRUG ADDON: CPT

## 2024-07-13 PROCEDURE — 83735 ASSAY OF MAGNESIUM: CPT

## 2024-07-13 PROCEDURE — 80053 COMPREHEN METABOLIC PANEL: CPT

## 2024-07-13 PROCEDURE — 99285 EMERGENCY DEPT VISIT HI MDM: CPT | Mod: 25

## 2024-07-13 PROCEDURE — 93312 ECHO TRANSESOPHAGEAL: CPT

## 2024-07-13 PROCEDURE — 96374 THER/PROPH/DIAG INJ IV PUSH: CPT

## 2024-07-13 PROCEDURE — 97161 PT EVAL LOW COMPLEX 20 MIN: CPT

## 2024-07-13 PROCEDURE — 84436 ASSAY OF TOTAL THYROXINE: CPT

## 2024-07-13 PROCEDURE — 82803 BLOOD GASES ANY COMBINATION: CPT

## 2024-07-13 PROCEDURE — 82330 ASSAY OF CALCIUM: CPT

## 2024-07-13 PROCEDURE — 87040 BLOOD CULTURE FOR BACTERIA: CPT

## 2024-07-13 PROCEDURE — 36415 COLL VENOUS BLD VENIPUNCTURE: CPT

## 2024-07-13 PROCEDURE — 85025 COMPLETE CBC W/AUTO DIFF WBC: CPT

## 2024-07-13 PROCEDURE — 83880 ASSAY OF NATRIURETIC PEPTIDE: CPT

## 2024-07-13 PROCEDURE — 84132 ASSAY OF SERUM POTASSIUM: CPT

## 2024-07-13 PROCEDURE — 71045 X-RAY EXAM CHEST 1 VIEW: CPT

## 2024-07-13 PROCEDURE — C8929: CPT

## 2024-07-13 PROCEDURE — 84681 ASSAY OF C-PEPTIDE: CPT

## 2024-07-13 PROCEDURE — 85027 COMPLETE CBC AUTOMATED: CPT

## 2024-07-13 PROCEDURE — 84443 ASSAY THYROID STIM HORMONE: CPT

## 2024-07-13 PROCEDURE — 71275 CT ANGIOGRAPHY CHEST: CPT | Mod: MC

## 2024-07-13 PROCEDURE — 83605 ASSAY OF LACTIC ACID: CPT

## 2024-07-13 PROCEDURE — 84295 ASSAY OF SERUM SODIUM: CPT

## 2024-07-13 PROCEDURE — 87637 SARSCOV2&INF A&B&RSV AMP PRB: CPT

## 2024-07-13 PROCEDURE — 82962 GLUCOSE BLOOD TEST: CPT

## 2024-07-16 ENCOUNTER — APPOINTMENT (OUTPATIENT)
Dept: HEART FAILURE | Facility: CLINIC | Age: 64
End: 2024-07-16
Payer: MEDICARE

## 2024-07-16 VITALS
HEART RATE: 101 BPM | SYSTOLIC BLOOD PRESSURE: 100 MMHG | TEMPERATURE: 98 F | HEIGHT: 66 IN | WEIGHT: 198 LBS | OXYGEN SATURATION: 96 % | DIASTOLIC BLOOD PRESSURE: 69 MMHG | BODY MASS INDEX: 31.82 KG/M2

## 2024-07-16 DIAGNOSIS — N18.9 CHRONIC KIDNEY DISEASE, UNSPECIFIED: ICD-10-CM

## 2024-07-16 DIAGNOSIS — Z95.810 PRESENCE OF AUTOMATIC (IMPLANTABLE) CARDIAC DEFIBRILLATOR: ICD-10-CM

## 2024-07-16 DIAGNOSIS — N17.9 ACUTE KIDNEY FAILURE, UNSPECIFIED: ICD-10-CM

## 2024-07-16 DIAGNOSIS — Z79.4 LONG TERM (CURRENT) USE OF INSULIN: ICD-10-CM

## 2024-07-16 DIAGNOSIS — I13.0 HYPERTENSIVE HEART AND CHRONIC KIDNEY DISEASE WITH HEART FAILURE AND STAGE 1 THROUGH STAGE 4 CHRONIC KIDNEY DISEASE, OR UNSPECIFIED CHRONIC KIDNEY DISEASE: ICD-10-CM

## 2024-07-16 DIAGNOSIS — J44.9 CHRONIC OBSTRUCTIVE PULMONARY DISEASE, UNSPECIFIED: ICD-10-CM

## 2024-07-16 DIAGNOSIS — E11.65 TYPE 2 DIABETES MELLITUS WITH HYPERGLYCEMIA: ICD-10-CM

## 2024-07-16 DIAGNOSIS — I21.A1 MYOCARDIAL INFARCTION TYPE 2: ICD-10-CM

## 2024-07-16 DIAGNOSIS — Z79.82 LONG TERM (CURRENT) USE OF ASPIRIN: ICD-10-CM

## 2024-07-16 DIAGNOSIS — Z79.02 LONG TERM (CURRENT) USE OF ANTITHROMBOTICS/ANTIPLATELETS: ICD-10-CM

## 2024-07-16 DIAGNOSIS — Z79.84 LONG TERM (CURRENT) USE OF ORAL HYPOGLYCEMIC DRUGS: ICD-10-CM

## 2024-07-16 DIAGNOSIS — F17.210 NICOTINE DEPENDENCE, CIGARETTES, UNCOMPLICATED: ICD-10-CM

## 2024-07-16 DIAGNOSIS — Z79.85 LONG-TERM (CURRENT) USE OF INJECTABLE NON-INSULIN ANTIDIABETIC DRUGS: ICD-10-CM

## 2024-07-16 DIAGNOSIS — R09.02 HYPOXEMIA: ICD-10-CM

## 2024-07-16 DIAGNOSIS — I34.0 NONRHEUMATIC MITRAL (VALVE) INSUFFICIENCY: ICD-10-CM

## 2024-07-16 DIAGNOSIS — E11.22 TYPE 2 DIABETES MELLITUS WITH DIABETIC CHRONIC KIDNEY DISEASE: ICD-10-CM

## 2024-07-16 DIAGNOSIS — Z95.5 PRESENCE OF CORONARY ANGIOPLASTY IMPLANT AND GRAFT: ICD-10-CM

## 2024-07-16 DIAGNOSIS — I25.10 ATHEROSCLEROTIC HEART DISEASE OF NATIVE CORONARY ARTERY WITHOUT ANGINA PECTORIS: ICD-10-CM

## 2024-07-16 DIAGNOSIS — I50.23 ACUTE ON CHRONIC SYSTOLIC (CONGESTIVE) HEART FAILURE: ICD-10-CM

## 2024-07-16 PROCEDURE — 99204 OFFICE O/P NEW MOD 45 MIN: CPT

## 2024-07-16 PROCEDURE — 99214 OFFICE O/P EST MOD 30 MIN: CPT

## 2024-07-17 LAB
ALBUMIN SERPL ELPH-MCNC: 4.4 G/DL
ALP BLD-CCNC: 112 U/L
ALT SERPL-CCNC: 9 U/L
ANION GAP SERPL CALC-SCNC: 15 MMOL/L
AST SERPL-CCNC: 12 U/L
BILIRUB SERPL-MCNC: 0.6 MG/DL
BUN SERPL-MCNC: 17 MG/DL
CALCIUM SERPL-MCNC: 9.6 MG/DL
CHLORIDE SERPL-SCNC: 99 MMOL/L
CO2 SERPL-SCNC: 26 MMOL/L
CREAT SERPL-MCNC: 1.72 MG/DL
EGFR: 44 ML/MIN/1.73M2
GLUCOSE SERPL-MCNC: 163 MG/DL
NT-PROBNP SERPL-MCNC: 3030 PG/ML
POTASSIUM SERPL-SCNC: 4.2 MMOL/L
PROT SERPL-MCNC: 7.1 G/DL
SODIUM SERPL-SCNC: 141 MMOL/L

## 2024-08-27 ENCOUNTER — APPOINTMENT (OUTPATIENT)
Dept: HEART FAILURE | Facility: CLINIC | Age: 64
End: 2024-08-27
Payer: MEDICARE

## 2024-08-27 ENCOUNTER — NON-APPOINTMENT (OUTPATIENT)
Age: 64
End: 2024-08-27

## 2024-08-27 VITALS
DIASTOLIC BLOOD PRESSURE: 75 MMHG | TEMPERATURE: 98.1 F | BODY MASS INDEX: 32.14 KG/M2 | HEIGHT: 66 IN | SYSTOLIC BLOOD PRESSURE: 119 MMHG | WEIGHT: 200 LBS | HEART RATE: 90 BPM | OXYGEN SATURATION: 97 %

## 2024-08-27 PROCEDURE — 99214 OFFICE O/P EST MOD 30 MIN: CPT

## 2024-08-27 NOTE — ASSESSMENT
[FreeTextEntry1] : 63/M active smoker, HFrEF ef 30% in 2023, arcelia MCCOLLUM([facility] in feb 2023 with non obs CAD, though pt thinks he had a stent), poorly controlled DM (A1c 11.1), HTN, s/p Single chamber ICD, was seen in NP clinic after in 2023 but has missed appointments with me since and has not followed up for > 1year had recent rehospitalization to [facility] in ADHF in the setting of medication non compliance. was diuresed and discharged after 4 days. now presents for post hospital discharge follow up.   TTE: 7/1/24: EF 25-30%, LVEDD 6.8cms, arcelia normal RV, mild-mod MR, trace TR, PASP 32 RA: 10 mmHg ,PA: 24/17,PCWP: 11, Dick CO: 3.75, CI: 1.8, Pa Sat: 61%   LHC: non obs cad  imp: ACC/AHA stage C HFrEf EF 30% s/p ICD uncontrolled DM  plan: Euvolemic, cont lasix 40mg po daily' will increase metoprolol xl back to 25mg po BID cont losartan --> will switch to ENtresto 24-26mg BID if K and renal fucntion better.  keep off kobi  cont farxiga 10 mg daily labs ordered encouraged to quit smoking recommended to have endocrine follow up will follow up with me in 1 month

## 2024-08-27 NOTE — PHYSICAL EXAM
[Well Developed] : well developed [No Acute Distress] : no acute distress [Normal Venous Pressure] : normal venous pressure [Normal S1, S2] : normal S1, S2 [No Murmur] : no murmur [No Gallop] : no gallop [Clear Lung Fields] : clear lung fields [Soft] : abdomen soft [Non Tender] : non-tender [No Edema] : no edema [Moves all extremities] : moves all extremities [No Focal Deficits] : no focal deficits [Alert and Oriented] : alert and oriented [de-identified] : warm to touch

## 2024-08-27 NOTE — HISTORY OF PRESENT ILLNESS
[FreeTextEntry1] : 63/M active smoker, HFrEF ef 30% in 2023, arcelia NICM([facility] in feb 2023 with non obs CAD, though pt thinks he had a stent), poorly controlled DM (A1c 11.1), HTN, s/p Single chamber ICD, was seen in NP clinic after in 2023 but has missed appointments with me since and has not followed up for > 1year had recent rehospitalization to [facility] in ADHF in the setting of medication non compliance. was diuresed and discharged after 4 days. now presents for post hospital discharge follow up.   TTE: 7/1/24: EF 25-30%, LVEDD 6.8cms, arcelia normal RV, mild-mod MR, trace TR, PASP 32 RA: 10 mmHg ,PA: 24/17,PCWP: 11, Dick CO: 3.75, CI: 1.8, Pa Sat: 61%   [facility]: non obs cad  pt was hospitalized from 6/30 to 7/3 at [facility]. had ER visit to [facility] 6/19.   since discharge from [facility]  has been compliant with meds. sticking to less fluid and salt. reduced the number of José smoking. walked from parking lot to the elevated w/o SOB, fatigue. denies any palpitation, dizziness or syncope. No cp. wt at home 198 similar to here.

## 2024-08-28 LAB
ALBUMIN SERPL ELPH-MCNC: 4.2 G/DL
ALP BLD-CCNC: 126 U/L
ALT SERPL-CCNC: 10 U/L
ANION GAP SERPL CALC-SCNC: 12 MMOL/L
AST SERPL-CCNC: 12 U/L
BILIRUB SERPL-MCNC: 0.4 MG/DL
BUN SERPL-MCNC: 22 MG/DL
CALCIUM SERPL-MCNC: 9.1 MG/DL
CHLORIDE SERPL-SCNC: 98 MMOL/L
CO2 SERPL-SCNC: 26 MMOL/L
CREAT SERPL-MCNC: 1.47 MG/DL
EGFR: 54 ML/MIN/1.73M2
ESTIMATED AVERAGE GLUCOSE: 237 MG/DL
GLUCOSE SERPL-MCNC: 328 MG/DL
HBA1C MFR BLD HPLC: 9.9 %
NT-PROBNP SERPL-MCNC: 1838 PG/ML
POTASSIUM SERPL-SCNC: 4.2 MMOL/L
PROT SERPL-MCNC: 6.9 G/DL
SODIUM SERPL-SCNC: 136 MMOL/L

## 2024-10-02 ENCOUNTER — NON-APPOINTMENT (OUTPATIENT)
Age: 64
End: 2024-10-02

## 2024-10-03 ENCOUNTER — APPOINTMENT (OUTPATIENT)
Dept: HEART FAILURE | Facility: CLINIC | Age: 64
End: 2024-10-03
Payer: MEDICARE

## 2024-10-03 VITALS
HEIGHT: 66 IN | DIASTOLIC BLOOD PRESSURE: 87 MMHG | HEART RATE: 110 BPM | OXYGEN SATURATION: 97 % | TEMPERATURE: 97.6 F | SYSTOLIC BLOOD PRESSURE: 138 MMHG | BODY MASS INDEX: 31.66 KG/M2 | WEIGHT: 197 LBS

## 2024-10-03 DIAGNOSIS — I50.22 CHRONIC SYSTOLIC (CONGESTIVE) HEART FAILURE: ICD-10-CM

## 2024-10-03 DIAGNOSIS — I10 ESSENTIAL (PRIMARY) HYPERTENSION: ICD-10-CM

## 2024-10-03 DIAGNOSIS — E11.9 TYPE 2 DIABETES MELLITUS W/OUT COMPLICATIONS: ICD-10-CM

## 2024-10-03 DIAGNOSIS — I25.10 ATHEROSCLEROTIC HEART DISEASE OF NATIVE CORONARY ARTERY W/OUT ANGINA PECTORIS: ICD-10-CM

## 2024-10-03 PROCEDURE — 99214 OFFICE O/P EST MOD 30 MIN: CPT

## 2024-10-03 RX ORDER — INSULIN GLARGINE 100 [IU]/ML
100 INJECTION, SOLUTION SUBCUTANEOUS
Refills: 0 | Status: ACTIVE | COMMUNITY
Start: 2024-10-03

## 2024-10-04 LAB
ANION GAP SERPL CALC-SCNC: 17 MMOL/L
BUN SERPL-MCNC: 17 MG/DL
CALCIUM SERPL-MCNC: 9.1 MG/DL
CHLORIDE SERPL-SCNC: 94 MMOL/L
CO2 SERPL-SCNC: 26 MMOL/L
CREAT SERPL-MCNC: 1.5 MG/DL
EGFR: 52 ML/MIN/1.73M2
GLUCOSE SERPL-MCNC: 419 MG/DL
NT-PROBNP SERPL-MCNC: 1835 PG/ML
POTASSIUM SERPL-SCNC: 4.6 MMOL/L
SODIUM SERPL-SCNC: 136 MMOL/L

## 2024-10-07 NOTE — ASSESSMENT
[FreeTextEntry1] : Mr. Greenfield is a 62 YO M active smoker, HFrEF ef 30% in 2023, likely NICM([facility] in feb 2023 with non obs CAD, though pt thinks he had a stent), poorly controlled DM (A1c 11.1), HTN, s/p Single chamber ICD, previously lost to followed up for > 1year had recent rehospitalization to [facility] in June 2024 ADHF in the setting of medication noncompliance, was diuresis and discharged after 4 days,  who presents for routine follow-up of his cardiomyopathy. He is ACC/AHA stage C HF, endorsing NYHA Class II symptoms. He appears near euvolemic with room for maximization of neurohormonal blockade.   HFrEF -GDMT: Currently taking Entresto 49-51mg BID, Farxiga 10mg QD. Instructed to resume Toprol 25mg QD today. Will obtain labs today and if acceptable will maximize Entresto and transition to PRN Lasix 40mg QD. Instructed to then increase Toprol 25mg to BID from QD if feeling well in the next 3-4 days on max entresto. In the past MRA held d/t hyperkalemia. -Diuretics: Will continue Lasix 40mg QD for now; Once on max Entresto will empirically reduce to PRN. He knows that if weight uptrends by +3lbs/day and/or +5lbs/week then will resume QD dosing of Lasix.   -Device: Currently has ICD in place. -Labs: will repeat today - Counseled on importance on ongoing smoking cessation; Guidance provided on use of nicotine patch  #CAD - Currently on ASA, Atorvastatin and BB as above  #DM - A1c 11.1% as of June 2024 - Currently on insulin - Encouraged f/u with endocrinology   RTC in 3-4 weeks with HF NP and then next available with Dr. Montoya

## 2024-10-07 NOTE — CARDIOLOGY SUMMARY
[de-identified] : TTE: 7/1/24: EF 25-30%, LVEDD 6.8cms, likley normal RV, mild-mod MR, trace TR, PASP 32 [de-identified] : L/RHC July 2024: RA: 10 mmHg ,PA: 24/17,PCWP: 11, Pa Sat: 61%, Dick CO: 3.75, CI: 1.8,   . LHC: non obs cad

## 2024-10-07 NOTE — HISTORY OF PRESENT ILLNESS
[FreeTextEntry1] : Mr. Greenfield is a 64 YO M active smoker, HFrEF ef 30% in 2023, likely NICM([facility] in feb 2023 with non obs CAD, though pt thinks he had a stent), poorly controlled DM (A1c 11.1), HTN, s/p Single chamber ICD, previously lost to followed up for > 1year had recent rehospitalization to [facility] in June 2024 ADHF in the setting of medication non compliance, was diuresis and discharged after 4 days,  who presents for routine follow-up of his cardiomyopathy  His index Hospitalization occurred at [facility] 1/30 - 2/3/23 for decompensated HF in the setting of missing his Lasix for few days. He was diuresis and underwent R/LHC revealing minor luminal irregularities of coronary arteries and relatively normal L and R sided filling pressures though with CI of 1.18 in the setting of elevated SVR of 1493 dsc. He was discharged the following day at a weight of 211 lbs.  He was then hospitalized from 6/30 to 7/3 at [facility]. had ER visit to [facility] 6/19, i/s/o medication non-adherence.   He presents to clinic today and states since last visit he has escalated Entresto to moderate dose and has felt well. He reports a robust AT and can walk >5 blocks without limitations. He does not routinely monitor his BP readings at home but today in clinic 138/87 reflective of AM dose of Entresto. He also does not monitor his weight but today weight is 197lbs from 200lbs. He complains of intermittent episodes of emesis precipitated by nausea not associated with food, that occurs every other month and associates it with coffee intake. He adheres to low sodium diet and limits fluid intake. He denies CP, SOB at rest, orthopnea, PND, LH/dizziness, abdominal discomfort, LE edema, palpitations, and syncope. He has not had an ICD discharge.  He has reduced his tobacco consumption to <1/2 PPD (previously would inhale a >1PPD)

## 2024-10-07 NOTE — CARDIOLOGY SUMMARY
[de-identified] : TTE: 7/1/24: EF 25-30%, LVEDD 6.8cms, likley normal RV, mild-mod MR, trace TR, PASP 32 [de-identified] : L/RHC July 2024: RA: 10 mmHg ,PA: 24/17,PCWP: 11, Pa Sat: 61%, Dick CO: 3.75, CI: 1.8,   . LHC: non obs cad

## 2024-10-07 NOTE — ADDENDUM
[FreeTextEntry1] : 10/4/24; Labs reviewed from 10/3 visit and acceptable. Called Mr. Greenfield and reviewed and above medication changes reviewed and discussed.

## 2024-10-07 NOTE — ASSESSMENT
[FreeTextEntry1] : Mr. Greenfield is a 62 YO M active smoker, HFrEF ef 30% in 2023, likely NICM([facility] in feb 2023 with non obs CAD, though pt thinks he had a stent), poorly controlled DM (A1c 11.1), HTN, s/p Single chamber ICD, previously lost to followed up for > 1year had recent rehospitalization to [facility] in June 2024 ADHF in the setting of medication noncompliance, was diuresis and discharged after 4 days,  who presents for routine follow-up of his cardiomyopathy. He is ACC/AHA stage C HF, endorsing NYHA Class II symptoms. He appears near euvolemic with room for maximization of neurohormonal blockade.   HFrEF -GDMT: Currently taking Entresto 49-51mg BID, Farxiga 10mg QD. Instructed to resume Toprol 25mg QD today. Will obtain labs today and if acceptable will maximize Entresto and transition to PRN Lasix 40mg QD. Instructed to then increase Toprol 25mg to BID from QD if feeling well in the next 3-4 days on max entresto. In the past MRA held d/t hyperkalemia. -Diuretics: Will continue Lasix 40mg QD for now; Once on max Entresto will empirically reduce to PRN. He knows that if weight uptrends by +3lbs/day and/or +5lbs/week then will resume QD dosing of Lasix.   -Device: Currently has ICD in place. -Labs: will repeat today - Counseled on importance on ongoing smoking cessation; Guidance provided on use of nicotine patch  #CAD - Currently on ASA, Atorvastatin and BB as above  #DM - A1c 11.1% as of June 2024 - Currently on insulin - Encouraged f/u with endocrinology   RTC in 3-4 weeks with HF NP and then next available with Dr. Montoya

## 2024-10-07 NOTE — PHYSICAL EXAM
[Well Developed] : well developed [No Acute Distress] : no acute distress [Normal Venous Pressure] : normal venous pressure [Normal S1, S2] : normal S1, S2 [No Murmur] : no murmur [No Gallop] : no gallop [Clear Lung Fields] : clear lung fields [Soft] : abdomen soft [Non Tender] : non-tender [No Edema] : no edema [Moves all extremities] : moves all extremities [No Focal Deficits] : no focal deficits [Alert and Oriented] : alert and oriented [de-identified] : JVP ~8cm of H2O [de-identified] : warm to touch

## 2024-10-07 NOTE — HISTORY OF PRESENT ILLNESS
[FreeTextEntry1] : Mr. Greenfield is a 64 YO M active smoker, HFrEF ef 30% in 2023, likely NICM([facility] in feb 2023 with non obs CAD, though pt thinks he had a stent), poorly controlled DM (A1c 11.1), HTN, s/p Single chamber ICD, previously lost to followed up for > 1year had recent rehospitalization to [facility] in June 2024 ADHF in the setting of medication non compliance, was diuresis and discharged after 4 days,  who presents for routine follow-up of his cardiomyopathy  His index Hospitalization occurred at [facility] 1/30 - 2/3/23 for decompensated HF in the setting of missing his Lasix for few days. He was diuresis and underwent R/LHC revealing minor luminal irregularities of coronary arteries and relatively normal L and R sided filling pressures though with CI of 1.18 in the setting of elevated SVR of 1493 dsc. He was discharged the following day at a weight of 211 lbs.  He was then hospitalized from 6/30 to 7/3 at [facility]. had ER visit to [facility] 6/19, i/s/o medication non-adherence.   He presents to clinic today and states since last visit he has escalated Entresto to moderate dose and has felt well. He reports a robust AT and can walk >5 blocks without limitations. He does not routinely monitor his BP readings at home but today in clinic 138/87 reflective of AM dose of Entresto. He also does not monitor his weight but today weight is 197lbs from 200lbs. He complains of intermittent episodes of emesis precipitated by nausea not associated with food, that occurs every other month and associates it with coffee intake. He adheres to low sodium diet and limits fluid intake. He denies CP, SOB at rest, orthopnea, PND, LH/dizziness, abdominal discomfort, LE edema, palpitations, and syncope. He has not had an ICD discharge.  He has reduced his tobacco consumption to <1/2 PPD (previously would inhale a >1PPD)

## 2024-10-07 NOTE — PHYSICAL EXAM
[Well Developed] : well developed [No Acute Distress] : no acute distress [Normal Venous Pressure] : normal venous pressure [Normal S1, S2] : normal S1, S2 [No Murmur] : no murmur [No Gallop] : no gallop [Clear Lung Fields] : clear lung fields [Soft] : abdomen soft [Non Tender] : non-tender [No Edema] : no edema [Moves all extremities] : moves all extremities [No Focal Deficits] : no focal deficits [Alert and Oriented] : alert and oriented [de-identified] : JVP ~8cm of H2O [de-identified] : warm to touch

## 2024-10-22 ENCOUNTER — APPOINTMENT (OUTPATIENT)
Dept: HEART FAILURE | Facility: CLINIC | Age: 64
End: 2024-10-22

## 2024-12-17 ENCOUNTER — APPOINTMENT (OUTPATIENT)
Dept: HEART FAILURE | Facility: CLINIC | Age: 64
End: 2024-12-17
Payer: MEDICARE

## 2024-12-17 VITALS
WEIGHT: 201 LBS | BODY MASS INDEX: 32.3 KG/M2 | HEART RATE: 94 BPM | TEMPERATURE: 97.5 F | OXYGEN SATURATION: 100 % | HEIGHT: 66 IN | DIASTOLIC BLOOD PRESSURE: 84 MMHG | SYSTOLIC BLOOD PRESSURE: 127 MMHG

## 2024-12-17 PROCEDURE — 99214 OFFICE O/P EST MOD 30 MIN: CPT

## 2024-12-20 LAB
ANION GAP SERPL CALC-SCNC: 13 MMOL/L
BUN SERPL-MCNC: 23 MG/DL
CALCIUM SERPL-MCNC: 9.3 MG/DL
CHLORIDE SERPL-SCNC: 97 MMOL/L
CO2 SERPL-SCNC: 24 MMOL/L
CREAT SERPL-MCNC: 1.33 MG/DL
EGFR: 60 ML/MIN/1.73M2
ESTIMATED AVERAGE GLUCOSE: 335 MG/DL
GLUCOSE SERPL-MCNC: 260 MG/DL
HBA1C MFR BLD HPLC: 13.3 %
NT-PROBNP SERPL-MCNC: 1468 PG/ML
POTASSIUM SERPL-SCNC: 4.7 MMOL/L
SODIUM SERPL-SCNC: 134 MMOL/L

## 2025-01-17 ENCOUNTER — APPOINTMENT (OUTPATIENT)
Dept: CV DIAGNOSITCS | Facility: HOSPITAL | Age: 65
End: 2025-01-17

## 2025-01-17 ENCOUNTER — OUTPATIENT (OUTPATIENT)
Dept: OUTPATIENT SERVICES | Facility: HOSPITAL | Age: 65
LOS: 1 days | End: 2025-01-17
Payer: MEDICARE

## 2025-01-17 ENCOUNTER — RESULT REVIEW (OUTPATIENT)
Age: 65
End: 2025-01-17

## 2025-01-17 DIAGNOSIS — I50.22 CHRONIC SYSTOLIC (CONGESTIVE) HEART FAILURE: ICD-10-CM

## 2025-01-17 DIAGNOSIS — Z98.890 OTHER SPECIFIED POSTPROCEDURAL STATES: Chronic | ICD-10-CM

## 2025-01-17 DIAGNOSIS — Z95.810 PRESENCE OF AUTOMATIC (IMPLANTABLE) CARDIAC DEFIBRILLATOR: Chronic | ICD-10-CM

## 2025-01-17 PROCEDURE — C8929: CPT

## 2025-01-17 PROCEDURE — 93306 TTE W/DOPPLER COMPLETE: CPT | Mod: 26

## 2025-05-02 ENCOUNTER — INPATIENT (INPATIENT)
Facility: HOSPITAL | Age: 65
LOS: 4 days | Discharge: HOME | DRG: 291 | End: 2025-05-07
Attending: INTERNAL MEDICINE | Admitting: INTERNAL MEDICINE
Payer: COMMERCIAL

## 2025-05-02 VITALS
TEMPERATURE: 98 F | RESPIRATION RATE: 18 BRPM | SYSTOLIC BLOOD PRESSURE: 113 MMHG | WEIGHT: 199.96 LBS | OXYGEN SATURATION: 97 % | DIASTOLIC BLOOD PRESSURE: 81 MMHG | HEIGHT: 67 IN | HEART RATE: 100 BPM

## 2025-05-02 DIAGNOSIS — Z98.890 OTHER SPECIFIED POSTPROCEDURAL STATES: Chronic | ICD-10-CM

## 2025-05-02 DIAGNOSIS — Z95.810 PRESENCE OF AUTOMATIC (IMPLANTABLE) CARDIAC DEFIBRILLATOR: Chronic | ICD-10-CM

## 2025-05-02 LAB
ADD ON TEST-SPECIMEN IN LAB: SIGNIFICANT CHANGE UP
TROPONIN T, HIGH SENSITIVITY RESULT: 58 NG/L — CRITICAL HIGH (ref 0–51)

## 2025-05-02 PROCEDURE — 71045 X-RAY EXAM CHEST 1 VIEW: CPT | Mod: 26

## 2025-05-02 PROCEDURE — 93010 ELECTROCARDIOGRAM REPORT: CPT

## 2025-05-02 PROCEDURE — 99285 EMERGENCY DEPT VISIT HI MDM: CPT

## 2025-05-02 RX ORDER — ACETAMINOPHEN 500 MG/5ML
650 LIQUID (ML) ORAL EVERY 6 HOURS
Refills: 0 | Status: DISCONTINUED | OUTPATIENT
Start: 2025-05-02 | End: 2025-05-07

## 2025-05-02 RX ORDER — FUROSEMIDE 10 MG/ML
40 INJECTION INTRAMUSCULAR; INTRAVENOUS ONCE
Refills: 0 | Status: COMPLETED | OUTPATIENT
Start: 2025-05-02 | End: 2025-05-02

## 2025-05-02 RX ORDER — FUROSEMIDE 10 MG/ML
40 INJECTION INTRAMUSCULAR; INTRAVENOUS DAILY
Refills: 0 | Status: DISCONTINUED | OUTPATIENT
Start: 2025-05-03 | End: 2025-05-03

## 2025-05-02 RX ORDER — MELATONIN 5 MG
3 TABLET ORAL AT BEDTIME
Refills: 0 | Status: DISCONTINUED | OUTPATIENT
Start: 2025-05-02 | End: 2025-05-07

## 2025-05-02 RX ADMIN — FUROSEMIDE 40 MILLIGRAM(S): 10 INJECTION INTRAMUSCULAR; INTRAVENOUS at 22:41

## 2025-05-02 NOTE — H&P ADULT - PROBLEM SELECTOR PLAN 1
Admit to tele for monitoring. Start lasix 40mg ivp daily and titrate to UO. Pt currently on metoprolol succinate bid and entresto bid; pt had stopped farxiga 2/2 "skin changes" that did not require medical evaluation. Check echo. Daily weights, I/Os.

## 2025-05-02 NOTE — PROVIDER CONTACT NOTE (CRITICAL VALUE NOTIFICATION) - NAME OF MD/NP/PA/DO NOTIFIED:
Late entry note  Notified by nursing that patient was having SOB that was resolved with deep breaths  Evaluated by myself at 1830  Patent is resting comfortably and states she does not have any pain, discomfort or SOB at this time  She denies headaches, change in vision, RUQ or epigastric pain or pain in calves  VSS, no tachycardia and normal O2 sat noted  Cardio and pulm exam wnl  Will continue monitoring       Dr Nita Lucas MD  PGY-2  7/8/2022  9:00 PM
Cha
Cha

## 2025-05-02 NOTE — ED PROVIDER NOTE - PHYSICAL EXAMINATION
General:  Well appearing, no distress at rest.  Mild dyspnea with exertion of getting up and moving around.   HEENT:  No conjunctival injection, neck supple, no congestion   Chest:  Non-tender, no crepitance  Lungs:  Clear to auscultation bilaterally   Heart:  s1s2 normal, no murmur  Abdomen:  soft, non-tender, non-distended  :  Deferred  Rectal:  Deferred  Extremities: 2+pitting edema BLE without erythema or tenderness   Neuro:  Alert, conversant, motor/sensory grossly intact

## 2025-05-02 NOTE — H&P ADULT - ENMT
Requested Prescriptions     Pending Prescriptions Disp Refills    AIMOVIG 140 MG/ML SOAJ [Pharmacy Med Name: Aimovig Subcutaneous Solution Auto-injector 140 MG/ML] 3 mL 0     Sig: Inject 140 mg into the skin every 30 days          Last Office Visit: 12/18/2023     Next Office Visit: Visit date not found         
negative

## 2025-05-02 NOTE — H&P ADULT - NSHPOUTPATIENTPROVIDERS_GEN_ALL_CORE
Cards: Dr. Carlos Mace ([facility])  Cards: Dr. Montoya ([facility] - next appointment 5/6)

## 2025-05-02 NOTE — ED PROVIDER NOTE - CLINICAL SUMMARY MEDICAL DECISION MAKING FREE TEXT BOX
63 yo M PMH DM, CAD, CHF, COPD current smoker reporting 1 week of increasing leg swelling, MARTINES.  Does not have orthopnea, but coughs when he tries to lie down.  No chest pain.  Patient states he regularly exceeds his recommended daily fluid restriction and lately has been drinking a lot of apple juice.  Has had similar symptoms in the past with CHF.  No leg pain or redness.  No cold symptoms or fever.  Was taken off lasix at the end of last year and told his other medications should help with the CHF.  Currently on metoprolol, atorvastatin, albuterol, entresto and asa.  Patient has been taking OTC diuretics without relief.     DDX:  CHF likely secondary to excessive fluid intake/dietary indiscretion.  Less likely secondary to ACS.  Could have a component of COPD.  Will assess further with xray, labs, ekg and will give a dose of IV lasix.  Patient formerly on 40. 63 yo M PMH DM, CAD, CHF, COPD current smoker reporting 1 week of increasing leg swelling, MARTINES.  Does not have orthopnea, but coughs when he tries to lie down.  No chest pain.  Patient states he regularly exceeds his recommended daily fluid restriction and lately has been drinking a lot of apple juice.  Has had similar symptoms in the past with CHF.  No leg pain or redness.  No cold symptoms or fever.  Was taken off lasix at the end of last year and told his other medications should help with the CHF.  Currently on metoprolol, atorvastatin, albuterol, entresto and asa.  Patient has been taking OTC diuretics without relief.     DDX:  CHF likely secondary to excessive fluid intake/dietary indiscretion.  Less likely secondary to ACS.  Could have a component of COPD.  Will assess further with xray, labs, ekg and will give a dose of IV lasix.  Patient formerly on 40.    Case d/w Cardiology who will admit

## 2025-05-02 NOTE — ED PROVIDER NOTE - OBJECTIVE STATEMENT
63 yo M PMH DM, CAD, CHF, COPD current smoker reporting 1 week of increasing leg swelling, MARTINES.  Does not have orthopnea, but coughs when he tries to lie down.  No chest pain.  Patient states he regularly exceeds his recommended daily fluid restriction and lately has been drinking a lot of apple juice.  Has had similar symptoms in the past with CHF.  No leg pain or redness.  No cold symptoms or fever.  Was taken off lasix at the end of last year and told his other medications should help with the CHF.  Currently on metoprolol, atorvastatin, albuterol, entresto and asa. Patient has been taking OTC diuretics without relief.

## 2025-05-02 NOTE — ED ADULT NURSE NOTE - NSICDXPASTMEDICALHX_GEN_ALL_CORE_FT
PAST MEDICAL HISTORY:  CAD (coronary artery disease)     Chronic obstructive pulmonary disease (COPD)     DM (diabetes mellitus)     HFrEF (heart failure with reduced ejection fraction)     History of tobacco use disorder     HTN (hypertension)

## 2025-05-02 NOTE — H&P ADULT - PROBLEM SELECTOR PLAN 2
Pt currently taking Tresiba once daily - will convert to glargine with recommended 20% reduction. Start nutritional admelog at 10units tid and titrate. QHS ISS.  Check A1c with aml. Pt reports checking bsfs infrequently at home. Never used CGM prescribed last summer and "ran out" of mounjaro  without seeking refills. Pt may benefit from diabetes educator and should be encouraged to follow up at outpatient.

## 2025-05-02 NOTE — H&P ADULT - PROBLEM SELECTOR PLAN 5
VTE PPX: low risk/oob  Diet: DASH/CC with 1200cc fluid restriction  Code status: full (presumed)  Dispo: Admit to tele for IV diuresis and GDMT management

## 2025-05-02 NOTE — H&P ADULT - HISTORY OF PRESENT ILLNESS
This is a 63 yo M current smoker, h/o HTN, HLD, DM2, CAD s/p RCA PCI, HFrEF (EF 30% 1/2025), s/p Biotronik ICD, COPD who presented to [facility] ED c/o 1 week of increasing leg swelling with MARTINES and PND.  Does not have orthopnea, but coughs when he tries to lie down.  No chest pain, palpitations, n/v or near/syncope.  Patient states he regularly exceeds his recommended daily fluid restriction and lately has been drinking a lot of apple juice.  Has had similar symptoms in the past with CHF.  No leg pain or redness.  No cold symptoms or fever.  Was taken off lasix at the end of last year and told his other medications should help with the CHF. Patient has been taking OTC diuretics without relief.  Currently on metoprolol, atorvastatin, albuterol, entresto and asa.  He is taking nightly Tresiba but "ran out" of novolog and mounjaro shortly after last admission; pt reports he has not followed up with PMD or endo.     In the ED, CXR shows cardiomegaly, ICD and PVC without effusions.  HsTrop 64 --> 58. BNP 9953.  Pt was treated with lasix 40mg ivp x 1 and is now admitted for further evaluation and management of acute on chronic systolic heart failure.    Echo 1/17/25: Mildly dilated LV EF 30% with global LV hypokinesis. Mild LVH. G1DD. NL RV size & function. No pericardial effusion.    Echo 7/1/24: EF 25-30%, dilated LA/LV, mild to mod MR (unchanged compared to 1/2023).

## 2025-05-02 NOTE — ED ADULT NURSE NOTE - CAS TRG GEN SKIN CONDITION
Patient is returning call, please see message below.     Callback Number: [phone]  Best Availability: anytime  Can A Detailed Message Be Left? yes  Did you confirm the message with the caller?: yes     Patient is looking for results to be conveyed.     Thank you,  Stephanie Lawler     Warm

## 2025-05-02 NOTE — ED ADULT NURSE NOTE - OBJECTIVE STATEMENT
Patient c/o of increasing SOB X 3 -4 days and increasing leg and ankle swelling, no difficulty speaking in long sentences, states unable to sleep at night due to coughing, no expectoration, states unable to lie down flat.  No chest pain, no lightheadedness/dizziness.  Extensive PMHx  AICD in place, DM, HTN, Heart failure, COPD.

## 2025-05-02 NOTE — H&P ADULT - NSHPLABSRESULTS_GEN_ALL_CORE
14.6   11.96 )-----------( 344      ( 02 May 2025 21:42 )             42.4     05-02    135  |  99  |  18  ----------------------------<  296[H]  5.1   |  25  |  1.36[H]    Ca    8.7      02 May 2025 21:42    Urinalysis Basic - ( 02 May 2025 21:42 )  Color: x / Appearance: x / SG: x / pH: x  Gluc: 296 mg/dL / Ketone: x  / Bili: x / Urobili: x   Blood: x / Protein: x / Nitrite: x   Leuk Esterase: x / RBC: x / WBC x   Sq Epi: x / Non Sq Epi: x / Bacteria: x    Pro-Brain Natriuretic Peptide (05.02.25 @ 21:42)    Pro-Brain Natriuretic Peptide: 9953:     HS Troponin trend: 64    (02 May 2025 21:42), 58    (02 May 2025 22:53)    PCXR (my read): cardiomegaly, left ICD, +PVC without effusion

## 2025-05-02 NOTE — H&P ADULT - CARDIOVASCULAR
normal/regular rate and rhythm/S1 S2 present/no gallops/no rub/no murmur/no JVD/peripheral edema/pedal edema details…

## 2025-05-02 NOTE — H&P ADULT - ASSESSMENT
This is a 63 yo M current smoker, h/o HTN, HLD, DM2, CAD s/p RCA PCI, HFrEF (EF 30% 1/2025), s/p Biotronik ICD, COPD who presents to [facility] ED c/o 1 week of increasing leg swelling with MARTINES and PND i/s/o medication and dietary noncompliance.  Will admit for tele monitoring and IV diuresis.

## 2025-05-02 NOTE — ED ADULT NURSE REASSESSMENT NOTE - NS ED NURSE REASSESS COMMENT FT1
Patient aoX3, c/o of SOB and increased bilateral leg/ankle swelling, no chest pain, no dizziness/lightheadedness.  NSR on cardiac monitor, vital signs stable.  Right AC PIV #20 in place, all labs sent,n o complicaitons.  Provider evaluation pending. STable and comfortable.

## 2025-05-02 NOTE — ED ADULT NURSE NOTE - NSFALLUNIVINTERV_ED_ALL_ED
Bed/Stretcher in lowest position, wheels locked, appropriate side rails in place/Call bell, personal items and telephone in reach/Instruct patient to call for assistance before getting out of bed/chair/stretcher/Non-slip footwear applied when patient is off stretcher/[place] to call system/Physically safe environment - no spills, clutter or unnecessary equipment/Purposeful proactive rounding/Room/bathroom lighting operational, light cord in reach

## 2025-05-02 NOTE — H&P ADULT - NSHPADDITIONALINFOADULT_GEN_ALL_CORE
Attending Attestation:  I was physically present for the key portions of the evaluation and management (E/M) service provided. I reviewed relevant data including imaging, labs and prior procedure reports available. I agree with the above history, physical, and plan which I have reviewed with the following edits/addendum:    64M smoker w COPD, DMT2, Ischemic CMP HFrEF 30% s/p ICD who p/w worsening LE edema, MARTINES x 1 week. Labs notable for elevated BNP.    #acute decomp HFrEF - continue IV diuresis, still vol up on exam. O2 for comfort. ~6L up, can possibly transition to PO diuretics in 1-2 days. Repeat BNP at [place].     Fernando Morgan MD  Cardiology Attending Attestation:  I was physically present for the key portions of the evaluation and management (E/M) service provided. I reviewed relevant data including imaging, labs and prior procedure reports available. I agree with the above history, physical, and plan which I have reviewed with the following edits/addendum:    64M smoker w COPD, DMT2, Ischemic CMP HFrEF 30% s/p ICD who p/w worsening LE edema, MARTINES x 1 week. Labs notable for elevated BNP.    #acute decomp HFrEF - continue IV diuresis, still vol up on exam. O2 for comfort. ~6L up, can possibly transition to PO diuretics in 1-2 days. Repeat BNP at [place].     Fernando Morgan MD  Cardiology  Initial encounter 5/3/25

## 2025-05-02 NOTE — H&P ADULT - PROBLEM SELECTOR PLAN 3
Pt with h/o copd without current exacerbation. continue home albuterol PRN. Pt reports being recommended for sleep study r/o NANCY which he has not scheduled. Will monitor overnight and refer on d/c

## 2025-05-03 ENCOUNTER — RESULT REVIEW (OUTPATIENT)
Age: 65
End: 2025-05-03

## 2025-05-03 DIAGNOSIS — N18.30 CHRONIC KIDNEY DISEASE, STAGE 3 UNSPECIFIED: ICD-10-CM

## 2025-05-03 DIAGNOSIS — E11.9 TYPE 2 DIABETES MELLITUS WITHOUT COMPLICATIONS: ICD-10-CM

## 2025-05-03 DIAGNOSIS — Z29.9 ENCOUNTER FOR PROPHYLACTIC MEASURES, UNSPECIFIED: ICD-10-CM

## 2025-05-03 DIAGNOSIS — I50.23 ACUTE ON CHRONIC SYSTOLIC (CONGESTIVE) HEART FAILURE: ICD-10-CM

## 2025-05-03 DIAGNOSIS — J44.9 CHRONIC OBSTRUCTIVE PULMONARY DISEASE, UNSPECIFIED: ICD-10-CM

## 2025-05-03 DIAGNOSIS — E78.5 HYPERLIPIDEMIA, UNSPECIFIED: ICD-10-CM

## 2025-05-03 LAB
A1C WITH ESTIMATED AVERAGE GLUCOSE RESULT: 12 % — HIGH (ref 4–5.6)
ADD ON TEST-SPECIMEN IN LAB: SIGNIFICANT CHANGE UP
ANION GAP SERPL CALC-SCNC: 10 MMOL/L — SIGNIFICANT CHANGE UP (ref 5–17)
BUN SERPL-MCNC: 20 MG/DL — SIGNIFICANT CHANGE UP (ref 7–23)
CALCIUM SERPL-MCNC: 8.8 MG/DL — SIGNIFICANT CHANGE UP (ref 8.4–10.5)
CHLORIDE SERPL-SCNC: 100 MMOL/L — SIGNIFICANT CHANGE UP (ref 96–108)
CHOLEST SERPL-MCNC: 117 MG/DL — SIGNIFICANT CHANGE UP
CO2 SERPL-SCNC: 26 MMOL/L — SIGNIFICANT CHANGE UP (ref 22–31)
CREAT SERPL-MCNC: 1.38 MG/DL — HIGH (ref 0.5–1.3)
EGFR: 57 ML/MIN/1.73M2 — LOW
EGFR: 57 ML/MIN/1.73M2 — LOW
ESTIMATED AVERAGE GLUCOSE: 298 MG/DL — HIGH (ref 68–114)
GLUCOSE SERPL-MCNC: 194 MG/DL — HIGH (ref 70–99)
HCT VFR BLD CALC: 41.2 % — SIGNIFICANT CHANGE UP (ref 39–50)
HDLC SERPL-MCNC: 27 MG/DL — LOW
HGB BLD-MCNC: 14 G/DL — SIGNIFICANT CHANGE UP (ref 13–17)
LDLC SERPL-MCNC: 75 MG/DL — SIGNIFICANT CHANGE UP
LIPID PNL WITH DIRECT LDL SERPL: 75 MG/DL — SIGNIFICANT CHANGE UP
MAGNESIUM SERPL-MCNC: 1.8 MG/DL — SIGNIFICANT CHANGE UP (ref 1.6–2.6)
MCHC RBC-ENTMCNC: 30.8 PG — SIGNIFICANT CHANGE UP (ref 27–34)
MCHC RBC-ENTMCNC: 34 G/DL — SIGNIFICANT CHANGE UP (ref 32–36)
MCV RBC AUTO: 90.5 FL — SIGNIFICANT CHANGE UP (ref 80–100)
NONHDLC SERPL-MCNC: 90 MG/DL — SIGNIFICANT CHANGE UP
NRBC BLD AUTO-RTO: 0 /100 WBCS — SIGNIFICANT CHANGE UP (ref 0–0)
PLATELET # BLD AUTO: 347 K/UL — SIGNIFICANT CHANGE UP (ref 150–400)
POTASSIUM SERPL-MCNC: 4.2 MMOL/L — SIGNIFICANT CHANGE UP (ref 3.5–5.3)
POTASSIUM SERPL-SCNC: 4.2 MMOL/L — SIGNIFICANT CHANGE UP (ref 3.5–5.3)
RBC # BLD: 4.55 M/UL — SIGNIFICANT CHANGE UP (ref 4.2–5.8)
RBC # FLD: 12.6 % — SIGNIFICANT CHANGE UP (ref 10.3–14.5)
SODIUM SERPL-SCNC: 136 MMOL/L — SIGNIFICANT CHANGE UP (ref 135–145)
TRIGL SERPL-MCNC: 73 MG/DL — SIGNIFICANT CHANGE UP
TROPONIN T, HIGH SENSITIVITY RESULT: 62 NG/L — CRITICAL HIGH (ref 0–51)
WBC # BLD: 9.84 K/UL — SIGNIFICANT CHANGE UP (ref 3.8–10.5)
WBC # FLD AUTO: 9.84 K/UL — SIGNIFICANT CHANGE UP (ref 3.8–10.5)

## 2025-05-03 PROCEDURE — 99223 1ST HOSP IP/OBS HIGH 75: CPT

## 2025-05-03 PROCEDURE — 93306 TTE W/DOPPLER COMPLETE: CPT | Mod: 26

## 2025-05-03 RX ORDER — INSULIN GLARGINE-YFGN 100 [IU]/ML
24 INJECTION, SOLUTION SUBCUTANEOUS AT BEDTIME
Refills: 0 | Status: DISCONTINUED | OUTPATIENT
Start: 2025-05-03 | End: 2025-05-06

## 2025-05-03 RX ORDER — INSULIN LISPRO 100 U/ML
10 INJECTION, SOLUTION INTRAVENOUS; SUBCUTANEOUS
Refills: 0 | Status: DISCONTINUED | OUTPATIENT
Start: 2025-05-03 | End: 2025-05-05

## 2025-05-03 RX ORDER — SODIUM CHLORIDE 9 G/1000ML
1000 INJECTION, SOLUTION INTRAVENOUS
Refills: 0 | Status: DISCONTINUED | OUTPATIENT
Start: 2025-05-03 | End: 2025-05-07

## 2025-05-03 RX ORDER — MAGNESIUM OXIDE 400 MG
800 TABLET ORAL ONCE
Refills: 0 | Status: COMPLETED | OUTPATIENT
Start: 2025-05-03 | End: 2025-05-03

## 2025-05-03 RX ORDER — INSULIN LISPRO 100 U/ML
INJECTION, SOLUTION INTRAVENOUS; SUBCUTANEOUS AT BEDTIME
Refills: 0 | Status: DISCONTINUED | OUTPATIENT
Start: 2025-05-03 | End: 2025-05-05

## 2025-05-03 RX ORDER — DEXTROSE 50 % IN WATER 50 %
15 SYRINGE (ML) INTRAVENOUS ONCE
Refills: 0 | Status: DISCONTINUED | OUTPATIENT
Start: 2025-05-03 | End: 2025-05-07

## 2025-05-03 RX ORDER — DEXTROSE 50 % IN WATER 50 %
25 SYRINGE (ML) INTRAVENOUS ONCE
Refills: 0 | Status: DISCONTINUED | OUTPATIENT
Start: 2025-05-03 | End: 2025-05-07

## 2025-05-03 RX ORDER — FUROSEMIDE 10 MG/ML
40 INJECTION INTRAMUSCULAR; INTRAVENOUS
Refills: 0 | Status: DISCONTINUED | OUTPATIENT
Start: 2025-05-04 | End: 2025-05-04

## 2025-05-03 RX ORDER — INSULIN LISPRO 100 U/ML
INJECTION, SOLUTION INTRAVENOUS; SUBCUTANEOUS AT BEDTIME
Refills: 0 | Status: DISCONTINUED | OUTPATIENT
Start: 2025-05-03 | End: 2025-05-03

## 2025-05-03 RX ORDER — ALBUTEROL SULFATE 2.5 MG/3ML
2 VIAL, NEBULIZER (ML) INHALATION EVERY 6 HOURS
Refills: 0 | Status: DISCONTINUED | OUTPATIENT
Start: 2025-05-03 | End: 2025-05-07

## 2025-05-03 RX ORDER — GLUCAGON 3 MG/1
1 POWDER NASAL ONCE
Refills: 0 | Status: DISCONTINUED | OUTPATIENT
Start: 2025-05-03 | End: 2025-05-07

## 2025-05-03 RX ORDER — METOPROLOL SUCCINATE 50 MG/1
25 TABLET, EXTENDED RELEASE ORAL EVERY 12 HOURS
Refills: 0 | Status: DISCONTINUED | OUTPATIENT
Start: 2025-05-03 | End: 2025-05-06

## 2025-05-03 RX ORDER — CLOPIDOGREL BISULFATE 75 MG/1
75 TABLET, FILM COATED ORAL DAILY
Refills: 0 | Status: DISCONTINUED | OUTPATIENT
Start: 2025-05-03 | End: 2025-05-07

## 2025-05-03 RX ORDER — FUROSEMIDE 10 MG/ML
40 INJECTION INTRAMUSCULAR; INTRAVENOUS ONCE
Refills: 0 | Status: COMPLETED | OUTPATIENT
Start: 2025-05-03 | End: 2025-05-03

## 2025-05-03 RX ORDER — DEXTROSE 50 % IN WATER 50 %
12.5 SYRINGE (ML) INTRAVENOUS ONCE
Refills: 0 | Status: DISCONTINUED | OUTPATIENT
Start: 2025-05-03 | End: 2025-05-07

## 2025-05-03 RX ORDER — INSULIN GLARGINE-YFGN 100 [IU]/ML
24 INJECTION, SOLUTION SUBCUTANEOUS ONCE
Refills: 0 | Status: COMPLETED | OUTPATIENT
Start: 2025-05-03 | End: 2025-05-03

## 2025-05-03 RX ORDER — ASPIRIN 325 MG
81 TABLET ORAL DAILY
Refills: 0 | Status: DISCONTINUED | OUTPATIENT
Start: 2025-05-03 | End: 2025-05-07

## 2025-05-03 RX ORDER — SACUBITRIL AND VALSARTAN 6; 6 MG/1; MG/1
1 PELLET ORAL
Refills: 0 | Status: DISCONTINUED | OUTPATIENT
Start: 2025-05-03 | End: 2025-05-07

## 2025-05-03 RX ORDER — ENOXAPARIN SODIUM 100 MG/ML
40 INJECTION SUBCUTANEOUS EVERY 24 HOURS
Refills: 0 | Status: DISCONTINUED | OUTPATIENT
Start: 2025-05-03 | End: 2025-05-07

## 2025-05-03 RX ORDER — ATORVASTATIN CALCIUM 80 MG/1
40 TABLET, FILM COATED ORAL AT BEDTIME
Refills: 0 | Status: DISCONTINUED | OUTPATIENT
Start: 2025-05-03 | End: 2025-05-07

## 2025-05-03 RX ADMIN — INSULIN GLARGINE-YFGN 24 UNIT(S): 100 INJECTION, SOLUTION SUBCUTANEOUS at 01:40

## 2025-05-03 RX ADMIN — FUROSEMIDE 40 MILLIGRAM(S): 10 INJECTION INTRAMUSCULAR; INTRAVENOUS at 16:50

## 2025-05-03 RX ADMIN — INSULIN LISPRO 2: 100 INJECTION, SOLUTION INTRAVENOUS; SUBCUTANEOUS at 01:39

## 2025-05-03 RX ADMIN — ENOXAPARIN SODIUM 40 MILLIGRAM(S): 100 INJECTION SUBCUTANEOUS at 18:15

## 2025-05-03 RX ADMIN — INSULIN LISPRO 10 UNIT(S): 100 INJECTION, SOLUTION INTRAVENOUS; SUBCUTANEOUS at 12:17

## 2025-05-03 RX ADMIN — METOPROLOL SUCCINATE 25 MILLIGRAM(S): 50 TABLET, EXTENDED RELEASE ORAL at 18:13

## 2025-05-03 RX ADMIN — FUROSEMIDE 40 MILLIGRAM(S): 10 INJECTION INTRAMUSCULAR; INTRAVENOUS at 09:19

## 2025-05-03 RX ADMIN — INSULIN GLARGINE-YFGN 24 UNIT(S): 100 INJECTION, SOLUTION SUBCUTANEOUS at 21:41

## 2025-05-03 RX ADMIN — METOPROLOL SUCCINATE 25 MILLIGRAM(S): 50 TABLET, EXTENDED RELEASE ORAL at 05:50

## 2025-05-03 RX ADMIN — Medication 800 MILLIGRAM(S): at 08:26

## 2025-05-03 RX ADMIN — INSULIN LISPRO 10 UNIT(S): 100 INJECTION, SOLUTION INTRAVENOUS; SUBCUTANEOUS at 08:26

## 2025-05-03 RX ADMIN — Medication 3 MILLIGRAM(S): at 00:58

## 2025-05-03 RX ADMIN — CLOPIDOGREL BISULFATE 75 MILLIGRAM(S): 75 TABLET, FILM COATED ORAL at 12:14

## 2025-05-03 RX ADMIN — Medication 81 MILLIGRAM(S): at 12:13

## 2025-05-03 RX ADMIN — SACUBITRIL AND VALSARTAN 1 TABLET(S): 6; 6 PELLET ORAL at 05:51

## 2025-05-03 RX ADMIN — ATORVASTATIN CALCIUM 40 MILLIGRAM(S): 80 TABLET, FILM COATED ORAL at 21:40

## 2025-05-03 NOTE — PROGRESS NOTE ADULT - PROBLEM SELECTOR PLAN 4
Continue home atorvastatin. Check fasting lipid panel with aml. Pt with h/o copd without current exacerbation. continue home albuterol PRN. Pt reports being recommended for sleep study r/o NANCY which he has not scheduled. Will monitor overnight and refer on d/c

## 2025-05-03 NOTE — PROGRESS NOTE ADULT - PROBLEM SELECTOR PLAN 5
FULL CODE  DVT Prophylaxis: Lovenox   Dispo: Pending clinical progression Continue home atorvastatin. Check fasting lipid panel with aml.

## 2025-05-03 NOTE — PROGRESS NOTE ADULT - NSPROGADDITIONALINFOA_GEN_ALL_CORE
Attending Attestation:  I was physically present for the key portions of the evaluation and management (E/M) service provided. I reviewed relevant data including imaging, labs and prior procedure reports available. I agree with the above history, physical, and plan.  - initial encounter 5/4/25. See HnP attestation  Fernando Morgan MD   Cardiology Attending Attestation:  I was physically present for the key portions of the evaluation and management (E/M) service provided. I reviewed relevant data including imaging, labs and prior procedure reports available. I agree with the above history, physical, and plan.  - initial encounter 5/3/25. See HnP attestation  Fernando Morgan MD   Cardiology

## 2025-05-03 NOTE — PROGRESS NOTE ADULT - PROBLEM SELECTOR PLAN 1
Warm and well perfused. Patient close to euvolemic on exam reports SOB at rest.   -Diuresis: Lasix 40 mg IV Daily increased to BID  -GDMT: Entresto 97/103 mg BID, Metoprolol Succinate 25 mg BID. pt had stopped farxiga 2/2 "skin changes"   -Echocardiogram 5/3/25: EF 23%, increased LV mass and eccentric hypertrophy, no regional wall motion abnormalities, borderline reduced RV function, Left atrium severely dilated, right atrium dilated, mild MR, PASP 34 mm Hg. EF 30% by echo 1/2025.  -Core measures, Is and Os and Daily weights.

## 2025-05-03 NOTE — PROGRESS NOTE ADULT - PROBLEM SELECTOR PLAN 3
Chief Complaint   Patient presents with    Dizziness        Doe Pollock is a 57 y.o. male with a history of multiple medical diagnoses as listed below that presents for evaluation of dizziness.  The patient was previously seen at [facility] and also in [place] for similar symptoms.  He says that he was having episodes of vertigo that were evaluated with MRI, MRA, carotid ultrasound, and possibly CT angiogram as well.  Present dose conflicting evidence about the possibility of having intravascular stenosis between the imaging studies that were done.  He says that he has not been able to get a clear consensus on the presence or absence of any vascular insufficiency.  In addition the patient says he began having difficulty with having increasing levels of for T especially with regards to his eyes in vision. He says he has been having weakness in his eyelids and seems to be having trouble keeping them open is does seem to be something as worse towards the end of the day.  This never been a problem in the past.    Interval History  03/31/2020  Continues to be concerned about dizziness and the potential for intracranial stenosis which are identified on some imaging modalities later refuted on subsequent  imaging modalities.  The patient has been despondent that to this point the consensus cannot be reached.  He says that he wants to know of the something that is were some going on intracranially that could be causing his symptoms.    04/16/2020  He has still been bothered by the incongruence between is test results. He has not had any further episodes of dizziness since he was last seen in clinic. He would like to pursue any testing that would be definitive in determining if there is any intravascular stenosis..    PAST MEDICAL HISTORY:  No past medical history on file.    PAST SURGICAL HISTORY:  No past surgical history on file.    SOCIAL HISTORY:  Social History     Socioeconomic History    Marital  status:      Spouse name: Not on file    Number of children: Not on file    Years of education: Not on file    Highest education level: Not on file   Occupational History    Not on file   Social Needs    Financial resource strain: Not on file    Food insecurity:     Worry: Not on file     Inability: Not on file    Transportation needs:     Medical: Not on file     Non-medical: Not on file   Tobacco Use    Smoking status: Never Smoker   Substance and Sexual Activity    Alcohol use: Yes     Comment: social    Drug use: No    Sexual activity: Not on file   Lifestyle    Physical activity:     Days per week: Not on file     Minutes per session: Not on file    Stress: Not on file   Relationships    Social connections:     Talks on phone: Not on file     Gets together: Not on file     Attends Tenriism service: Not on file     Active member of club or organization: Not on file     Attends meetings of clubs or organizations: Not on file     Relationship status: Not on file   Other Topics Concern    Not on file   Social History Narrative    Not on file       FAMILY HISTORY:  No family history on file.    ALLERGIES AND MEDICATIONS: updated and reviewed.  Review of patient's allergies indicates:   Allergen Reactions    Iodinated contrast media Hives     Current Outpatient Medications   Medication Sig Dispense Refill    aspirin 81 MG Chew Take 1 tablet (81 mg total) by mouth once daily. 90 tablet 2    diazePAM (VALIUM) 5 MG tablet Take 1 tablet (5 mg total) by mouth every 8 (eight) hours as needed (dizziness). 20 tablet 1    hydrOXYzine HCl (ATARAX) 25 MG tablet Take 1 tablet (25 mg total) by mouth every 6 (six) hours. 20 tablet 0    losartan (COZAAR) 25 MG tablet Take 1 tablet (25 mg total) by mouth once daily. 90 tablet 2    prochlorperazine (COMPAZINE) 10 MG tablet Take 1 tablet (10 mg total) by mouth 3 (three) times daily as needed (nausea and/or headache). 60 tablet 3    pyridostigmine  (MESTINON) 60 mg Tab Take 1 tablet (60 mg total) by mouth every 6 (six) hours. 120 tablet 11     No current facility-administered medications for this visit.        Review of Systems   Constitutional: Positive for fatigue. Negative for activity change and unexpected weight change.   HENT: Negative for trouble swallowing and voice change.    Eyes: Negative for photophobia, pain and visual disturbance.   Respiratory: Negative for apnea and shortness of breath.    Cardiovascular: Negative for chest pain and palpitations.   Gastrointestinal: Negative for constipation, nausea and vomiting.   Genitourinary: Negative for difficulty urinating.   Musculoskeletal: Negative for arthralgias, back pain, gait problem, myalgias and neck pain.   Skin: Negative for color change and rash.   Neurological: Positive for dizziness. Negative for seizures, syncope, speech difficulty, weakness, light-headedness, numbness and headaches.   Psychiatric/Behavioral: Negative for agitation, behavioral problems and confusion.       Neurologic Exam     Mental Status   Oriented to person, place, and time.   Registration: recalls 3 of 3 objects.   Attention: normal. Concentration: normal.   Speech: speech is normal   Level of consciousness: alert  Knowledge: good.     Cranial Nerves     CN II   Visual fields full to confrontation.   Right visual field deficit: none  Left visual field deficit: none     CN III, IV, VI   Pupils are equal, round, and reactive to light.  Extraocular motions are normal.   Right pupil: Size: 3 mm. Shape: regular. Accommodation: intact.   Left pupil: Size: 3 mm. Shape: regular. Accommodation: intact.   CN III: no CN III palsy  CN VI: no CN VI palsy  Nystagmus: none   Diplopia: none  Ophthalmoparesis: none  Upgaze: normal  Downgaze: normal  Conjugate gaze: present    CN V   Facial sensation intact.   Right facial sensation deficit: none  Left facial sensation deficit: none    CN VII   Facial expression full, symmetric.    Right facial weakness: none  Left facial weakness: none    CN VIII   CN VIII normal.     CN IX, X   CN IX normal.   CN X normal.   Palate: symmetric    CN XI   CN XI normal.   Right sternocleidomastoid strength: normal  Left sternocleidomastoid strength: normal  Right trapezius strength: normal  Left trapezius strength: normal    CN XII   CN XII normal.   Tongue deviation: none    Motor Exam   Muscle bulk: normal  Overall muscle tone: normal  Right arm tone: normal  Left arm tone: normal  Right leg tone: normal  Left leg tone: normal    Strength   Strength 5/5 throughout.     Sensory Exam   Right arm light touch: normal  Left arm light touch: normal  Right leg light touch: normal  Left leg light touch: normal  Right arm vibration: normal  Left arm vibration: normal  Right leg vibration: normal  Left leg vibration: normal  Right arm proprioception: normal  Left arm proprioception: normal  Right leg proprioception: normal  Left leg proprioception: normal  Right arm pinprick: normal  Left arm pinprick: normal  Right leg pinprick: normal  Left leg pinprick: normal    Gait, Coordination, and Reflexes     Gait  Gait: normal    Coordination   Romberg: negative  Finger to nose coordination: normal  Heel to shin coordination: normal  Tandem walking coordination: normal    Tremor   Resting tremor: absent    Reflexes   Right brachioradialis: 2+  Left brachioradialis: 2+  Right biceps: 2+  Left biceps: 2+  Right triceps: 2+  Left triceps: 2+  Right patellar: 2+  Left patellar: 2+  Right achilles: 2+  Left achilles: 2+  Right plantar: normal  Left plantar: normal      Physical Exam   Constitutional: He is oriented to person, place, and time. He appears well-developed and well-nourished.   HENT:   Head: Normocephalic and atraumatic.   Eyes: Pupils are equal, round, and reactive to light. EOM are normal.   Cardiovascular: Intact distal pulses.   Pulmonary/Chest: Effort normal. No respiratory distress.   Musculoskeletal: Normal  "range of motion.   Neurological: He is alert and oriented to person, place, and time. He has normal strength. He has a normal Finger-Nose-Finger Test, a normal Heel to Shin Test, a normal Romberg Test and a normal Tandem Gait Test. Gait normal.   Reflex Scores:       Tricep reflexes are 2+ on the right side and 2+ on the left side.       Bicep reflexes are 2+ on the right side and 2+ on the left side.       Brachioradialis reflexes are 2+ on the right side and 2+ on the left side.       Patellar reflexes are 2+ on the right side and 2+ on the left side.       Achilles reflexes are 2+ on the right side and 2+ on the left side.  Skin: Skin is warm and dry.   Psychiatric: He has a normal mood and affect. His speech is normal and behavior is normal.       Vitals:    05/01/20 1657   Weight: 96.1 kg (211 lb 13.8 oz)   Height: 6' 4" (1.93 m)       Assessment & Plan:    Problem List Items Addressed This Visit     None      Visit Diagnoses     Fatigue, unspecified type    -  Primary    Relevant Medications    pyridostigmine (MESTINON) 60 mg Tab    Vertigo        Relevant Orders    CTA Head and Neck (xpd)    Creatinine, serum        The patient location is: home  The chief complaint leading to consultation is: dizziness  Visit type: Virtual visit with synchronous audio and video  Total time spent with patient: 25  Each patient to whom he or she provides medical services by telemedicine is:  (1) informed of the relationship between the physician and patient and the respective role of any other health care provider with respect to management of the patient; and (2) notified that he or she may decline to receive medical services by telemedicine and may withdraw from such care at any time.    Notes:     More than 50% of this 25 minute encounter was spent in counseling and coordinating care of dizziness.    Follow-up: Follow up in about 1 month (around 5/16/2020).    This note was done with the assistance of voice recognition " software. Some errors may be present after proofreading.       Pt with h/o copd without current exacerbation. continue home albuterol PRN. Pt reports being recommended for sleep study r/o NANCY which he has not scheduled. Will monitor overnight and refer on d/c -A1C 12%.   Tresiba converted to Glargine with 20% reduction.   -Admelog 12 units Sub Q TID.    Pt reports checking bsfs infrequently at home. Never used CGM prescribed last summer and "ran out" of mounjaro  without seeking refills. Pt may benefit from diabetes educator and should be encouraged to follow up at outpatient.

## 2025-05-03 NOTE — PROGRESS NOTE ADULT - PROBLEM SELECTOR PLAN 2
-A1C 12%.   Tresiba converted to Glargine with 20% reduction.   -Admelog 12 units Sub Q TID.    Pt reports checking bsfs infrequently at home. Never used CGM prescribed last summer and "ran out" of mounjaro  without seeking refills. Pt may benefit from diabetes educator and should be encouraged to follow up at outpatient. Baseline Cr 1.3-1.5 June/July 2023. Cr 1.38 on admission at baseline  -Renally dose medications and avoid nephrotoxic agents.  -Monitor urine output, BP, volume status, electrolytes, renal function.   -Daily Phosphorous

## 2025-05-03 NOTE — PATIENT PROFILE ADULT - MST SCORE
How Severe Is Your Condition?: mild
Additional History: Pt presents for sclerotherapy on his ankles. Never had treatment before
0

## 2025-05-03 NOTE — PROGRESS NOTE ADULT - SUBJECTIVE AND OBJECTIVE BOX
Interventional Cardiology PA Adult Progress Note    Subjective Assessment:  	  MEDICATIONS:  furosemide   Injectable 40 milliGRAM(s) IV Push daily  metoprolol succinate ER 25 milliGRAM(s) Oral every 12 hours  sacubitril 97 mG/valsartan 103 mG 1 Tablet(s) Oral two times a day      albuterol    90 MICROgram(s) HFA Inhaler 2 Puff(s) Inhalation every 6 hours PRN    acetaminophen     Tablet .. 650 milliGRAM(s) Oral every 6 hours PRN  melatonin 3 milliGRAM(s) Oral at bedtime PRN      atorvastatin 40 milliGRAM(s) Oral at bedtime  dextrose 50% Injectable 25 Gram(s) IV Push once  dextrose 50% Injectable 12.5 Gram(s) IV Push once  dextrose 50% Injectable 25 Gram(s) IV Push once  dextrose Oral Gel 15 Gram(s) Oral once PRN  glucagon  Injectable 1 milliGRAM(s) IntraMuscular once  insulin glargine Injectable (LANTUS) 24 Unit(s) SubCutaneous at bedtime  insulin lispro (ADMELOG) corrective regimen sliding scale   SubCutaneous at bedtime  insulin lispro Injectable (ADMELOG) 10 Unit(s) SubCutaneous three times a day before meals    aspirin enteric coated 81 milliGRAM(s) Oral daily  clopidogrel Tablet 75 milliGRAM(s) Oral daily  dextrose 5%. 1000 milliLiter(s) IV Continuous <Continuous>  dextrose 5%. 1000 milliLiter(s) IV Continuous <Continuous>      	    [PHYSICAL EXAM:  TELEMETRY:  T(C): 36.4 (05-03-25 @ 12:15), Max: 36.7 (05-02-25 @ 20:39)  HR: 89 (05-03-25 @ 12:15) (85 - 100)  BP: 98/58 (05-03-25 @ 12:15) (98/58 - 116/72)  RR: 18 (05-03-25 @ 12:15) (17 - 20)  SpO2: 98% (05-03-25 @ 12:15) (97% - 100%)  Wt(kg): --  I&O's Summary    02 May 2025 07:01  -  03 May 2025 07:00  --------------------------------------------------------  IN: 180 mL / OUT: 0 mL / NET: 180 mL    03 May 2025 07:01  -  03 May 2025 15:46  --------------------------------------------------------  IN: 420 mL / OUT: 1000 mL / NET: -580 mL      Height (cm): 170.2 (05-02 @ 20:39)  Weight (kg): 90.7 (05-02 @ 20:39)  BMI (kg/m2): 31.3 (05-02 @ 20:39)  BSA (m2): 2.02 (05-02 @ 20:39)  Pritchett:  Central/PICC/Mid Line:                                         Appearance: Normal	  HEENT:   Normal oral mucosa, PERRL, EOMI	  Neck: Supple, + JVD/ - JVD; Carotid Bruit   Cardiovascular: Normal S1 S2, No JVD, No murmurs,   Respiratory: Lungs clear to auscultation/Decreased Breath Sounds/No Rales, Rhonchi, Wheezing	  Gastrointestinal:  Soft, Non-tender, + BS	  Skin: No rashes, No ecchymoses, No cyanosis  Extremities: Normal range of motion, No clubbing, cyanosis or edema  Vascular: Peripheral pulses palpable 2+ bilaterally  Neurologic: Non-focal  Psychiatry: A & O x 3, Mood & affect appropriate      	    ECG:  	  RADIOLOGY:   DIAGNOSTIC TESTING:  [ ] Echocardiogram:  [ ]  Catheterization:  [ ] Stress Test:    [ ] JOSSY  OTHER: 	    LABS:	 	  CARDIAC MARKERS:                                  14.0   9.84  )-----------( 347      ( 03 May 2025 06:30 )             41.2     05-03    136  |  100  |  20  ----------------------------<  194[H]  4.2   |  26  |  1.38[H]    Ca    8.8      03 May 2025 06:30  Mg     1.8     05-03      proBNP:   Lipid Profile:   HgA1c:   TSH: Thyroid Stimulating Hormone, Serum: 0.371 uIU/mL (05-03 @ 06:30)        ASSESSMENT/PLAN: 	           Interventional Cardiology PA Adult Progress Note    CC: Acute on Chronic HFrEF  Subjective Assessment: Patient seen and examined at bedside. Patient reports SOB at rest and dry cough. He denies C/P, palpitations, dizziness, diaphoresis, N/V    ROS negative except per HPI and Subjective  	  MEDICATIONS:  furosemide   Injectable 40 milliGRAM(s) IV Push daily  metoprolol succinate ER 25 milliGRAM(s) Oral every 12 hours  sacubitril 97 mG/valsartan 103 mG 1 Tablet(s) Oral two times a day  albuterol    90 MICROgram(s) HFA Inhaler 2 Puff(s) Inhalation every 6 hours PRN  acetaminophen     Tablet .. 650 milliGRAM(s) Oral every 6 hours PRN  melatonin 3 milliGRAM(s) Oral at bedtime PRN  atorvastatin 40 milliGRAM(s) Oral at bedtime  dextrose 50% Injectable 25 Gram(s) IV Push once  dextrose 50% Injectable 12.5 Gram(s) IV Push once  dextrose 50% Injectable 25 Gram(s) IV Push once  dextrose Oral Gel 15 Gram(s) Oral once PRN  glucagon  Injectable 1 milliGRAM(s) IntraMuscular once  insulin glargine Injectable (LANTUS) 24 Unit(s) SubCutaneous at bedtime  insulin lispro (ADMELOG) corrective regimen sliding scale   SubCutaneous at bedtime  insulin lispro Injectable (ADMELOG) 10 Unit(s) SubCutaneous three times a day before meals  aspirin enteric coated 81 milliGRAM(s) Oral daily  clopidogrel Tablet 75 milliGRAM(s) Oral daily  dextrose 5%. 1000 milliLiter(s) IV Continuous <Continuous>  dextrose 5%. 1000 milliLiter(s) IV Continuous <Continuous>      [PHYSICAL EXAM:  TELEMETRY:  T(C): 36.4 (05-03-25 @ 12:15), Max: 36.7 (05-02-25 @ 20:39)  HR: 89 (05-03-25 @ 12:15) (85 - 100)  BP: 98/58 (05-03-25 @ 12:15) (98/58 - 116/72)  RR: 18 (05-03-25 @ 12:15) (17 - 20)  SpO2: 98% (05-03-25 @ 12:15) (97% - 100%)  Wt(kg): --  I&O's Summary    02 May 2025 07:01  -  03 May 2025 07:00  --------------------------------------------------------  IN: 180 mL / OUT: 0 mL / NET: 180 mL    03 May 2025 07:01  -  03 May 2025 15:46  --------------------------------------------------------  IN: 420 mL / OUT: 1000 mL / NET: -580 mL      Height (cm): 170.2 (05-02 @ 20:39)  Weight (kg): 90.7 (05-02 @ 20:39)  BMI (kg/m2): 31.3 (05-02 @ 20:39)  BSA (m2): 2.02 (05-02 @ 20:39)    Pritchett: No  Central/PICC/Mid Line:    No                                     Appearance: Normal	  HEENT:   Normal oral mucosa, PERRL, EOMI	  Neck: Supple. No JVD  Cardiovascular: + S1 S2. RRR. No murmurs.   Respiratory: CTA Bilaterally. No rhonchi, wheezes, rales.   Gastrointestinal:  Soft, Non-tender, + BS	x 4   Skin: No rashes, No ecchymoses, No cyanosis  Extremities: Normal range of motion, No clubbing, cyanosis. Trace LE Edema BLE. Warm and well perfused.   Vascular: Peripheral pulses palpable 2+ bilaterally  Neurologic: Non-focal  Psychiatry: A & O x 3, Mood & affect appropriate  	    ECG:  	  RADIOLOGY:   DIAGNOSTIC TESTING:  [ ] Echocardiogram:  [ ]  Catheterization:  [ ] Stress Test:    [ ] JOSSY  OTHER: 	    LABS:	 	  CARDIAC MARKERS:                    14.0   9.84  )-----------( 347      ( 03 May 2025 06:30 )             41.2     05-03    136  |  100  |  20  ----------------------------<  194[H]  4.2   |  26  |  1.38[H]    Ca    8.8      03 May 2025 06:30  Mg     1.8     05-03      proBNP:   Lipid Profile:   HgA1c:   TSH: Thyroid Stimulating Hormone, Serum: 0.371 uIU/mL (05-03 @ 06:30)        ASSESSMENT/PLAN:

## 2025-05-04 LAB
ALBUMIN SERPL ELPH-MCNC: 3.3 G/DL — SIGNIFICANT CHANGE UP (ref 3.3–5)
ALP SERPL-CCNC: 123 U/L — HIGH (ref 40–120)
ALT FLD-CCNC: 35 U/L — SIGNIFICANT CHANGE UP (ref 10–45)
ANION GAP SERPL CALC-SCNC: 10 MMOL/L — SIGNIFICANT CHANGE UP (ref 5–17)
AST SERPL-CCNC: 18 U/L — SIGNIFICANT CHANGE UP (ref 10–40)
BASOPHILS # BLD AUTO: 0.1 K/UL — SIGNIFICANT CHANGE UP (ref 0–0.2)
BASOPHILS NFR BLD AUTO: 1.2 % — SIGNIFICANT CHANGE UP (ref 0–2)
BILIRUB SERPL-MCNC: 0.4 MG/DL — SIGNIFICANT CHANGE UP (ref 0.2–1.2)
BUN SERPL-MCNC: 23 MG/DL — SIGNIFICANT CHANGE UP (ref 7–23)
CALCIUM SERPL-MCNC: 8.6 MG/DL — SIGNIFICANT CHANGE UP (ref 8.4–10.5)
CHLORIDE SERPL-SCNC: 102 MMOL/L — SIGNIFICANT CHANGE UP (ref 96–108)
CO2 SERPL-SCNC: 27 MMOL/L — SIGNIFICANT CHANGE UP (ref 22–31)
CREAT SERPL-MCNC: 1.44 MG/DL — HIGH (ref 0.5–1.3)
EGFR: 55 ML/MIN/1.73M2 — LOW
EGFR: 55 ML/MIN/1.73M2 — LOW
EOSINOPHIL # BLD AUTO: 0.31 K/UL — SIGNIFICANT CHANGE UP (ref 0–0.5)
EOSINOPHIL NFR BLD AUTO: 3.6 % — SIGNIFICANT CHANGE UP (ref 0–6)
GLUCOSE SERPL-MCNC: 193 MG/DL — HIGH (ref 70–99)
HCT VFR BLD CALC: 40.1 % — SIGNIFICANT CHANGE UP (ref 39–50)
HGB BLD-MCNC: 13.6 G/DL — SIGNIFICANT CHANGE UP (ref 13–17)
IMM GRANULOCYTES NFR BLD AUTO: 0.5 % — SIGNIFICANT CHANGE UP (ref 0–0.9)
LYMPHOCYTES # BLD AUTO: 1.75 K/UL — SIGNIFICANT CHANGE UP (ref 1–3.3)
LYMPHOCYTES # BLD AUTO: 20.5 % — SIGNIFICANT CHANGE UP (ref 13–44)
MAGNESIUM SERPL-MCNC: 1.8 MG/DL — SIGNIFICANT CHANGE UP (ref 1.6–2.6)
MCHC RBC-ENTMCNC: 31.3 PG — SIGNIFICANT CHANGE UP (ref 27–34)
MCHC RBC-ENTMCNC: 33.9 G/DL — SIGNIFICANT CHANGE UP (ref 32–36)
MCV RBC AUTO: 92.4 FL — SIGNIFICANT CHANGE UP (ref 80–100)
MONOCYTES # BLD AUTO: 0.66 K/UL — SIGNIFICANT CHANGE UP (ref 0–0.9)
MONOCYTES NFR BLD AUTO: 7.7 % — SIGNIFICANT CHANGE UP (ref 2–14)
NEUTROPHILS # BLD AUTO: 5.66 K/UL — SIGNIFICANT CHANGE UP (ref 1.8–7.4)
NEUTROPHILS NFR BLD AUTO: 66.5 % — SIGNIFICANT CHANGE UP (ref 43–77)
NRBC BLD AUTO-RTO: 0 /100 WBCS — SIGNIFICANT CHANGE UP (ref 0–0)
PHOSPHATE SERPL-MCNC: 4.2 MG/DL — SIGNIFICANT CHANGE UP (ref 2.5–4.5)
PLATELET # BLD AUTO: 331 K/UL — SIGNIFICANT CHANGE UP (ref 150–400)
POTASSIUM SERPL-MCNC: 4.1 MMOL/L — SIGNIFICANT CHANGE UP (ref 3.5–5.3)
POTASSIUM SERPL-SCNC: 4.1 MMOL/L — SIGNIFICANT CHANGE UP (ref 3.5–5.3)
PROT SERPL-MCNC: 5.5 G/DL — LOW (ref 6–8.3)
RBC # BLD: 4.34 M/UL — SIGNIFICANT CHANGE UP (ref 4.2–5.8)
RBC # FLD: 12.8 % — SIGNIFICANT CHANGE UP (ref 10.3–14.5)
SODIUM SERPL-SCNC: 139 MMOL/L — SIGNIFICANT CHANGE UP (ref 135–145)
WBC # BLD: 8.52 K/UL — SIGNIFICANT CHANGE UP (ref 3.8–10.5)
WBC # FLD AUTO: 8.52 K/UL — SIGNIFICANT CHANGE UP (ref 3.8–10.5)

## 2025-05-04 PROCEDURE — 99222 1ST HOSP IP/OBS MODERATE 55: CPT

## 2025-05-04 PROCEDURE — 99233 SBSQ HOSP IP/OBS HIGH 50: CPT

## 2025-05-04 RX ORDER — SPIRONOLACTONE 25 MG
25 TABLET ORAL DAILY
Refills: 0 | Status: DISCONTINUED | OUTPATIENT
Start: 2025-05-04 | End: 2025-05-07

## 2025-05-04 RX ORDER — MAGNESIUM OXIDE 400 MG
400 TABLET ORAL ONCE
Refills: 0 | Status: COMPLETED | OUTPATIENT
Start: 2025-05-04 | End: 2025-05-04

## 2025-05-04 RX ORDER — FUROSEMIDE 10 MG/ML
40 INJECTION INTRAMUSCULAR; INTRAVENOUS DAILY
Refills: 0 | Status: DISCONTINUED | OUTPATIENT
Start: 2025-05-04 | End: 2025-05-05

## 2025-05-04 RX ORDER — INSULIN LISPRO 100 U/ML
5 INJECTION, SOLUTION INTRAVENOUS; SUBCUTANEOUS ONCE
Refills: 0 | Status: COMPLETED | OUTPATIENT
Start: 2025-05-04 | End: 2025-05-04

## 2025-05-04 RX ORDER — INSULIN GLARGINE-YFGN 100 [IU]/ML
12 INJECTION, SOLUTION SUBCUTANEOUS AT BEDTIME
Refills: 0 | Status: COMPLETED | OUTPATIENT
Start: 2025-05-04 | End: 2025-05-04

## 2025-05-04 RX ADMIN — Medication 3 MILLIGRAM(S): at 22:30

## 2025-05-04 RX ADMIN — METOPROLOL SUCCINATE 25 MILLIGRAM(S): 50 TABLET, EXTENDED RELEASE ORAL at 05:23

## 2025-05-04 RX ADMIN — FUROSEMIDE 40 MILLIGRAM(S): 10 INJECTION INTRAMUSCULAR; INTRAVENOUS at 14:30

## 2025-05-04 RX ADMIN — Medication 81 MILLIGRAM(S): at 12:22

## 2025-05-04 RX ADMIN — SACUBITRIL AND VALSARTAN 1 TABLET(S): 6; 6 PELLET ORAL at 05:23

## 2025-05-04 RX ADMIN — SACUBITRIL AND VALSARTAN 1 TABLET(S): 6; 6 PELLET ORAL at 18:06

## 2025-05-04 RX ADMIN — METOPROLOL SUCCINATE 25 MILLIGRAM(S): 50 TABLET, EXTENDED RELEASE ORAL at 18:06

## 2025-05-04 RX ADMIN — ENOXAPARIN SODIUM 40 MILLIGRAM(S): 100 INJECTION SUBCUTANEOUS at 18:06

## 2025-05-04 RX ADMIN — Medication 400 MILLIGRAM(S): at 08:58

## 2025-05-04 RX ADMIN — CLOPIDOGREL BISULFATE 75 MILLIGRAM(S): 75 TABLET, FILM COATED ORAL at 12:22

## 2025-05-04 RX ADMIN — INSULIN LISPRO 10 UNIT(S): 100 INJECTION, SOLUTION INTRAVENOUS; SUBCUTANEOUS at 07:49

## 2025-05-04 RX ADMIN — INSULIN LISPRO 10 UNIT(S): 100 INJECTION, SOLUTION INTRAVENOUS; SUBCUTANEOUS at 12:22

## 2025-05-04 RX ADMIN — Medication 25 MILLIGRAM(S): at 22:31

## 2025-05-04 RX ADMIN — INSULIN LISPRO 5 UNIT(S): 100 INJECTION, SOLUTION INTRAVENOUS; SUBCUTANEOUS at 17:29

## 2025-05-04 RX ADMIN — ATORVASTATIN CALCIUM 40 MILLIGRAM(S): 80 TABLET, FILM COATED ORAL at 22:31

## 2025-05-04 RX ADMIN — INSULIN GLARGINE-YFGN 12 UNIT(S): 100 INJECTION, SOLUTION SUBCUTANEOUS at 22:30

## 2025-05-04 NOTE — PROGRESS NOTE ADULT - PROBLEM SELECTOR PLAN 2
Baseline Cr 1.3-1.5 (2023); Cr 1.38 on admission; currently 1.44   - Avoid nephrotoxins, renally dose meds, strict I&O, daily phosphorus

## 2025-05-04 NOTE — PROGRESS NOTE ADULT - PROBLEM SELECTOR PLAN 3
- A1c 12%  - Continue: Admelog 12 units SC TID; Glargine w/ 20% reduction (TIC w/ Tresiba)   - Consult endo, f/u recs; DM educator; outpatient follow up

## 2025-05-04 NOTE — PROGRESS NOTE ADULT - PROBLEM SELECTOR PLAN 1
Dry wt ~82 kg, dry proBNP ~4k   - TTE (5/3/25): EF 23%, increased LV mass and eccentric hypertrophy, no regional wall motion abnormalities, borderline reduced RV function, Left atrium severely dilated, right atrium dilated, mild MR, PASP 34 mm Hg. EF 30% by echo 1/2025.  - Diuresis: Lasix 40 mg IV Daily increased to BID  - GDMT: Entresto 97/103 mg BID, Metoprolol Succinate 25 mg BID. pt had stopped farxiga 2/2 "skin changes"   - Follow up BNP in AM   - Core measures, strict I&O, daily weights Dry wt ~82 kg, dry proBNP ~4k   - TTE (5/3/25): EF 23%, increased LV mass and eccentric hypertrophy, no regional wall motion abnormalities, borderline reduced RV function, Left atrium severely dilated, right atrium dilated, mild MR, PASP 34 mm Hg. EF 30% by echo 1/2025.  - Diuresis: s/p IV lasix; initiating lasix 40 mg po qd today   - GDMT: Entresto 97/103 mg BID, Metoprolol Succinate 25 mg BID. pt had stopped farxiga 2/2 "skin changes"   - Follow up BNP in AM   - Core measures, strict I&O, daily weights

## 2025-05-04 NOTE — PROGRESS NOTE ADULT - SUBJECTIVE AND OBJECTIVE BOX
Cardiology PA Progress Note    S: Pt seen and examined bedside. Pt states he feels better from a breathing standpoint than since he arrived to hospital; notes that he was a bit short of breath overnight.   Patient denies C/P, SOB, N/V, dizziness, palpitations, and diaphoresis.  Pt denies fever/chills, dysuria, abdominal pain, diarrhea, and cough  12 Point ROS otherwise negative except as per HPI/subjective.     O: Vital Signs Last 24 Hrs  T(C): 36.5 (04 May 2025 05:09), Max: 36.8 (04 May 2025 01:08)  T(F): 97.7 (04 May 2025 05:09), Max: 98.2 (04 May 2025 01:08)  HR: 85 (04 May 2025 05:09) (80 - 90)  BP: 93/60 (04 May 2025 05:09) (90/54 - 114/67)  BP(mean): 72 (04 May 2025 05:09) (71 - 86)  RR: 18 (04 May 2025 05:09) (18 - 18)  SpO2: 96% (04 May 2025 05:09) (95% - 98%)    Parameters below as of 04 May 2025 05:09  Patient On (Oxygen Delivery Method): nasal cannula  O2 Flow (L/min): 1    PHYSICAL EXAM:  GEN: NAD  HEENT: No JVD  PULM:  CTA B/L  CARD:  RRR, S1 and S2   ABD: +BS, NT, soft/ND	  EXT: No Edema B/L LE, trace b/l LE edema, WWPx 4 extremities   NEURO: A+Ox3, no focal deficit  PSYCH: Mood Appropriate    LABS:                        13.6   8.52  )-----------( 331      ( 04 May 2025 05:40 )             40.1     05-04    139  |  102  |  23  ----------------------------<  193[H]  4.1   |  27  |  1.44[H]    Ca    8.6      04 May 2025 05:40  Phos  4.2     05-04  Mg     1.8     05-04    TPro  5.5[L]  /  Alb  3.3  /  TBili  0.4  /  DBili  x   /  AST  18  /  ALT  35  /  AlkPhos  123[H]   @ 07:  -   @ 07:00  --------------------------------------------------------  IN: 600 mL / OUT: 3025 mL / NET: -2425 mL     @ 07:  -   @ 09:54  --------------------------------------------------------  IN: 180 mL / OUT: 0 mL / NET: 180 mL      Daily     Daily Weight in k.5 (04 May 2025 06:30)

## 2025-05-04 NOTE — PROGRESS NOTE ADULT - PROBLEM SELECTOR PLAN 6
DVT ppx: Lovenox   F: none currently   E: Keep K > 4, Mg > 2  N: DASH/TLC     Code: Full  Dispo: pending clinical progression     Case discussed with Dr. Morgan.

## 2025-05-04 NOTE — PROGRESS NOTE ADULT - NSPROGADDITIONALINFOA_GEN_ALL_CORE
Attending Attestation:  I was physically present for the key portions of the evaluation and management (E/M) service provided. I reviewed relevant data including imaging, labs and prior procedure reports available. I agree with the above history, physical, and plan which I have reviewed with the following edits/addendum:    64M smoker w COPD, DMT2, Ischemic CMP HFrEF 30% s/p ICD who p/w worsening LE edema, MARTINES x 1 week. Labs notable for elevated BNP.    - close to euvolemia, transition to PO diuresis. O2 for comfort  - resume GDMT: metop succ 25, Entresto , start MRA, [id number]    Fernando Morgan MD  Cardiology Attending Attestation:  I was physically present for the key portions of the evaluation and management (E/M) service provided. I reviewed relevant data including imaging, labs and prior procedure reports available. I agree with the above history, physical, and plan which I have reviewed with the following edits/addendum:    64M smoker w COPD, DMT2, Ischemic CMP HFrEF 30% s/p ICD who p/w worsening LE edema, MARTINES x 1 week. Labs notable for elevated BNP.    - close to euvolemia, transition to PO diuresis. O2 for comfort  - resume GDMT: metop succ 25, Entresto , start MRA. Had some reported SE from SGLT2i (can start as OP)    Fernando Morgan MD  Cardiology

## 2025-05-05 DIAGNOSIS — E04.1 NONTOXIC SINGLE THYROID NODULE: ICD-10-CM

## 2025-05-05 LAB
ALBUMIN SERPL ELPH-MCNC: 3.3 G/DL — SIGNIFICANT CHANGE UP (ref 3.3–5)
ALP SERPL-CCNC: 114 U/L — SIGNIFICANT CHANGE UP (ref 40–120)
ALT FLD-CCNC: 27 U/L — SIGNIFICANT CHANGE UP (ref 10–45)
ANION GAP SERPL CALC-SCNC: 11 MMOL/L — SIGNIFICANT CHANGE UP (ref 5–17)
AST SERPL-CCNC: 15 U/L — SIGNIFICANT CHANGE UP (ref 10–40)
BILIRUB SERPL-MCNC: 0.5 MG/DL — SIGNIFICANT CHANGE UP (ref 0.2–1.2)
BUN SERPL-MCNC: 24 MG/DL — HIGH (ref 7–23)
CALCIUM SERPL-MCNC: 8.6 MG/DL — SIGNIFICANT CHANGE UP (ref 8.4–10.5)
CHLORIDE SERPL-SCNC: 103 MMOL/L — SIGNIFICANT CHANGE UP (ref 96–108)
CO2 SERPL-SCNC: 26 MMOL/L — SIGNIFICANT CHANGE UP (ref 22–31)
CREAT SERPL-MCNC: 1.24 MG/DL — SIGNIFICANT CHANGE UP (ref 0.5–1.3)
EGFR: 65 ML/MIN/1.73M2 — SIGNIFICANT CHANGE UP
EGFR: 65 ML/MIN/1.73M2 — SIGNIFICANT CHANGE UP
GLUCOSE SERPL-MCNC: 95 MG/DL — SIGNIFICANT CHANGE UP (ref 70–99)
HCT VFR BLD CALC: 40 % — SIGNIFICANT CHANGE UP (ref 39–50)
HGB BLD-MCNC: 13.7 G/DL — SIGNIFICANT CHANGE UP (ref 13–17)
MAGNESIUM SERPL-MCNC: 1.9 MG/DL — SIGNIFICANT CHANGE UP (ref 1.6–2.6)
MCHC RBC-ENTMCNC: 30.8 PG — SIGNIFICANT CHANGE UP (ref 27–34)
MCHC RBC-ENTMCNC: 34.3 G/DL — SIGNIFICANT CHANGE UP (ref 32–36)
MCV RBC AUTO: 89.9 FL — SIGNIFICANT CHANGE UP (ref 80–100)
NRBC BLD AUTO-RTO: 0 /100 WBCS — SIGNIFICANT CHANGE UP (ref 0–0)
NT-PROBNP SERPL-SCNC: 2778 PG/ML — HIGH (ref 0–300)
PLATELET # BLD AUTO: 327 K/UL — SIGNIFICANT CHANGE UP (ref 150–400)
POTASSIUM SERPL-MCNC: 4 MMOL/L — SIGNIFICANT CHANGE UP (ref 3.5–5.3)
POTASSIUM SERPL-SCNC: 4 MMOL/L — SIGNIFICANT CHANGE UP (ref 3.5–5.3)
PROT SERPL-MCNC: 5.8 G/DL — LOW (ref 6–8.3)
RBC # BLD: 4.45 M/UL — SIGNIFICANT CHANGE UP (ref 4.2–5.8)
RBC # FLD: 12.5 % — SIGNIFICANT CHANGE UP (ref 10.3–14.5)
SODIUM SERPL-SCNC: 140 MMOL/L — SIGNIFICANT CHANGE UP (ref 135–145)
WBC # BLD: 7.87 K/UL — SIGNIFICANT CHANGE UP (ref 3.8–10.5)
WBC # FLD AUTO: 7.87 K/UL — SIGNIFICANT CHANGE UP (ref 3.8–10.5)

## 2025-05-05 PROCEDURE — 99232 SBSQ HOSP IP/OBS MODERATE 35: CPT

## 2025-05-05 PROCEDURE — 99233 SBSQ HOSP IP/OBS HIGH 50: CPT

## 2025-05-05 PROCEDURE — 71045 X-RAY EXAM CHEST 1 VIEW: CPT | Mod: 26

## 2025-05-05 RX ORDER — INSULIN LISPRO 100 U/ML
5 INJECTION, SOLUTION INTRAVENOUS; SUBCUTANEOUS ONCE
Refills: 0 | Status: COMPLETED | OUTPATIENT
Start: 2025-05-05 | End: 2025-05-05

## 2025-05-05 RX ORDER — INSULIN LISPRO 100 U/ML
INJECTION, SOLUTION INTRAVENOUS; SUBCUTANEOUS
Refills: 0 | Status: DISCONTINUED | OUTPATIENT
Start: 2025-05-05 | End: 2025-05-07

## 2025-05-05 RX ORDER — MAGNESIUM OXIDE 400 MG
400 TABLET ORAL ONCE
Refills: 0 | Status: COMPLETED | OUTPATIENT
Start: 2025-05-05 | End: 2025-05-05

## 2025-05-05 RX ORDER — FUROSEMIDE 10 MG/ML
40 INJECTION INTRAMUSCULAR; INTRAVENOUS ONCE
Refills: 0 | Status: COMPLETED | OUTPATIENT
Start: 2025-05-05 | End: 2025-05-05

## 2025-05-05 RX ORDER — INSULIN LISPRO 100 U/ML
5 INJECTION, SOLUTION INTRAVENOUS; SUBCUTANEOUS
Refills: 0 | Status: DISCONTINUED | OUTPATIENT
Start: 2025-05-05 | End: 2025-05-07

## 2025-05-05 RX ADMIN — INSULIN GLARGINE-YFGN 24 UNIT(S): 100 INJECTION, SOLUTION SUBCUTANEOUS at 22:00

## 2025-05-05 RX ADMIN — SACUBITRIL AND VALSARTAN 1 TABLET(S): 6; 6 PELLET ORAL at 17:50

## 2025-05-05 RX ADMIN — FUROSEMIDE 40 MILLIGRAM(S): 10 INJECTION INTRAMUSCULAR; INTRAVENOUS at 06:02

## 2025-05-05 RX ADMIN — Medication 25 MILLIGRAM(S): at 10:47

## 2025-05-05 RX ADMIN — METOPROLOL SUCCINATE 25 MILLIGRAM(S): 50 TABLET, EXTENDED RELEASE ORAL at 18:56

## 2025-05-05 RX ADMIN — METOPROLOL SUCCINATE 25 MILLIGRAM(S): 50 TABLET, EXTENDED RELEASE ORAL at 06:02

## 2025-05-05 RX ADMIN — FUROSEMIDE 40 MILLIGRAM(S): 10 INJECTION INTRAMUSCULAR; INTRAVENOUS at 12:16

## 2025-05-05 RX ADMIN — INSULIN LISPRO 4: 100 INJECTION, SOLUTION INTRAVENOUS; SUBCUTANEOUS at 12:18

## 2025-05-05 RX ADMIN — CLOPIDOGREL BISULFATE 75 MILLIGRAM(S): 75 TABLET, FILM COATED ORAL at 10:47

## 2025-05-05 RX ADMIN — INSULIN LISPRO 5 UNIT(S): 100 INJECTION, SOLUTION INTRAVENOUS; SUBCUTANEOUS at 08:47

## 2025-05-05 RX ADMIN — SACUBITRIL AND VALSARTAN 1 TABLET(S): 6; 6 PELLET ORAL at 06:02

## 2025-05-05 RX ADMIN — ATORVASTATIN CALCIUM 40 MILLIGRAM(S): 80 TABLET, FILM COATED ORAL at 22:00

## 2025-05-05 RX ADMIN — INSULIN LISPRO 5 UNIT(S): 100 INJECTION, SOLUTION INTRAVENOUS; SUBCUTANEOUS at 17:39

## 2025-05-05 RX ADMIN — Medication 81 MILLIGRAM(S): at 10:47

## 2025-05-05 RX ADMIN — Medication 400 MILLIGRAM(S): at 10:53

## 2025-05-05 RX ADMIN — INSULIN LISPRO 5 UNIT(S): 100 INJECTION, SOLUTION INTRAVENOUS; SUBCUTANEOUS at 12:18

## 2025-05-05 RX ADMIN — Medication 3 MILLIGRAM(S): at 23:33

## 2025-05-05 RX ADMIN — ENOXAPARIN SODIUM 40 MILLIGRAM(S): 100 INJECTION SUBCUTANEOUS at 17:40

## 2025-05-05 NOTE — PROGRESS NOTE ADULT - PROBLEM SELECTOR PLAN 6
DVT ppx: Lovenox   F: none currently   E: Keep K > 4, Mg > 2  N: DASH/TLC     Code: Full  Dispo: pending clinical progression     Case discussed with Dr. Gibbons.

## 2025-05-05 NOTE — PROGRESS NOTE ADULT - SUBJECTIVE AND OBJECTIVE BOX
Cardiology PA Progress Note    S: Pt seen and examined bedside. Pt states he had some episodes of shortness of breath overnight; feels well with the oxygen. Amenable to wean.   Patient denies C/P, SOB, N/V, dizziness, palpitations, and diaphoresis.  Pt denies fever/chills, dysuria, abdominal pain, diarrhea, and cough  12 Point ROS otherwise negative except as per HPI/subjective.     O: Vital Signs Last 24 Hrs  T(C): 36.8 (05 May 2025 08:49), Max: 37 (04 May 2025 13:25)  T(F): 98.2 (05 May 2025 08:49), Max: 98.6 (04 May 2025 13:25)  HR: 87 (05 May 2025 10:46) (81 - 93)  BP: 103/68 (05 May 2025 10:46) (93/58 - 129/70)  BP(mean): 80 (05 May 2025 10:46) (71 - 91)  RR: 18 (05 May 2025 10:46) (18 - 18)  SpO2: 98% (05 May 2025 10:46) (92% - 100%)    Parameters below as of 05 May 2025 10:46  Patient On (Oxygen Delivery Method): nasal cannula  O2 Flow (L/min): 2    PHYSICAL EXAM:  GEN: NAD  HEENT: No JVD  PULM:  mildly decreased to L base; CTA otherwise   CARD:  RRR, S1 and S2   ABD: +BS, NT, soft/ND	  EXT: 1+ pitting edema to shins b/l   NEURO: A+Ox3, no focal deficit  PSYCH: Mood Appropriate    LABS:                        13.7   7.87  )-----------( 327      ( 05 May 2025 06:04 )             40.0     05-05    140  |  103  |  24[H]  ----------------------------<  95  4.0   |  26  |  1.24    Ca    8.6      05 May 2025 06:04  Phos  4.2     05-04  Mg     1.9     05-05    TPro  5.8[L]  /  Alb  3.3  /  TBili  0.5  /  DBili  x   /  AST  15  /  ALT  27  /  AlkPhos  114            05-04 @ 07:01  -  05- @ 07:00  --------------------------------------------------------  IN: 360 mL / OUT: 1775 mL / NET: -1415 mL     @ 07:  -  - @ 12:03  --------------------------------------------------------  IN: 180 mL / OUT: 0 mL / NET: 180 mL      Daily     Daily Weight in k (05 May 2025 06:47)

## 2025-05-05 NOTE — PROGRESS NOTE ADULT - PROBLEM SELECTOR PLAN 2
Baseline Cr 1.3-1.5 (2023); Cr 1.38 on admission; currently 1.24  - Avoid nephrotoxins, renally dose meds, strict I&O, daily phosphorus

## 2025-05-05 NOTE — DISCHARGE NOTE PROVIDER - NSDCCPCAREPLAN_GEN_ALL_CORE_FT
PRINCIPAL DISCHARGE DIAGNOSIS  Diagnosis: Acute on chronic systolic congestive heart failure  Assessment and Plan of Treatment: You have a weak heart, also known as Congestive Heart Failure (CHF). Heart failure is a condition in which the heart does not pump or fill with blood well. As a result, the heart lags behind in its job of moving blood throughout the body. This can lead to symptoms such as swelling, trouble breathing, and feeling tired. Your Ejection Fraction (EF) is 23%; a normal EF is 55-60%.  -Please continue _____ to prevent fluid build up in the body.  - Please continue _______________ to prevent advancement of your heart disease.   - Your ICD was interrogated on _________ and showed _____.   -Avoid drinking more than 1.5L of fluid daily and maintain a low salt diet (max 2grams daily).  -Please weigh yourself daily, for any significant increases in daily weight of 2lbs/day or 5lbs/week with associated swelling in the legs or abdomen and/or shortness of breath, please call your doctor or go to the emergency room.  -Follow up with Dr. Plummer_ in 1 week.      SECONDARY DISCHARGE DIAGNOSES  Diagnosis: DM2 (diabetes mellitus, type 2)  Assessment and Plan of Treatment: Your Hemoglobin A1c is 12% and your goal A1c is less than 7.0%. This number measures your average blood sugar level over the last three months.  Please continue __________________ as listed for diabetes. Maintain a low carbohydrate, low sugar diet, exercise, monitor your fingerstick blood sugars regarly and follow up with your Endocrinologist/Primary Care Doctor.   You may follow up with a [facility] endocrinologist by calling [phone].  If you want to follow up with a [facility] diabetologist on [place]: Dr. Nico Astorga, [address] [id number]. Phone: [phone].    Diagnosis: COPD without exacerbation  Assessment and Plan of Treatment: You have a history of COPD. Please continue taking your medications, including _____.  If you develop shortness of breath, fever, chest pain, cough, or any other concerning symptoms please seek immediate medical attention.    Diagnosis: Dyslipidemia  Assessment and Plan of Treatment: Please continue ____ Atorvatsatin 40 mg ___at bedtime to keep your cholesterol low. High cholesterol contributes to heart disease.    Diagnosis: Thyroid nodule  Assessment and Plan of Treatment: You were found to have a thyroid nodule on an ultrasound in February 2023 and it was recommended that you follow up for possible biopsy. Please follow this up with either your endocrinologist or a [facility] endocrinologist; you may call [phone] to schedule an appointment.    Diagnosis: HTN (hypertension)  Assessment and Plan of Treatment: Please continue __________________________ as listed to keep your blood pressure controlled. For blood pressure that is too high or too low please see your doctor or go to the emergency room as necessary.    Diagnosis: CAD (coronary artery disease)  Assessment and Plan of Treatment: You have a history of coronary artery disease, or disease in the arteries that supply blood to your heart. You may continue to take Aspirin 81 mg daily, Plavix 75 mg daily, Metoprolol succinate __________ twice a day____________________ and Atorvastatin 40 mg daily.     PRINCIPAL DISCHARGE DIAGNOSIS  Diagnosis: Acute on chronic systolic congestive heart failure  Assessment and Plan of Treatment: You have a weak heart, also known as Congestive Heart Failure (CHF). Heart failure is a condition in which the heart does not pump or fill with blood well. As a result, the heart lags behind in its job of moving blood throughout the body. This can lead to symptoms such as swelling, trouble breathing, and feeling tired. Your Ejection Fraction (EF) is 23%; a normal EF is 55-60%.  - Please continue Lasix 40 mg twice a day to prevent fluid build up in the body. When you have lost 3 pounds, please switch to Lasix 40 mg once a day.   - Please continue Entresto 97/103 mg twice a day, spironolactone 25 mg once a day, and metoprolol succinate 50 mg once a day to prevent advancement of your heart disease.   - Your ICD was interrogated on 5/6/25 by the electrophysiology team, and it showed normal device function.   - Avoid drinking more than 1.5L of fluid daily and maintain a low salt diet (max 2grams daily).  - Please weigh yourself daily, for any significant increases in daily weight of 2lbs/day or 5lbs/week with associated swelling in the legs or abdomen and/or shortness of breath, please call your doctor or go to the emergency room.  -Follow up with Dr. Pisano at your appointment scheduled for 5/21/25.      SECONDARY DISCHARGE DIAGNOSES  Diagnosis: CAD (coronary artery disease)  Assessment and Plan of Treatment: You have a history of coronary artery disease, or disease in the arteries that supply blood to your heart. Please continue your home Aspirin 81 mg once a day, Plavix 75 mg once a day, and atorvastatin 40 mg once a day. Aspirin and Plavix can put you at increased risk of bleeding; please avoid NSAIDS (such as Motrin, Advil, Ibuprofen, Naproxen, or Aleve, as these medications may further your risk of bleeding). Please continue to follow up with your cardiologist.    Diagnosis: DM2 (diabetes mellitus, type 2)  Assessment and Plan of Treatment: Your Hemoglobin A1c is 12% and your goal A1c is less than 7.0%. This number measures your average blood sugar level over the last three months.  Please start Lantus 10 units at bedtime, and Admelog 8 units three times a day with meals as listed for diabetes. Maintain a low carbohydrate, low sugar diet, exercise, monitor your fingerstick blood sugars regarly and follow up with your Endocrinologist/Primary Care Doctor.   You may follow up with a [facility] endocrinologist by calling [phone].  If you want to follow up with a [facility] diabetologist on [place]: Dr. Nico Astorga, [address] [place] [address]. Phone: [phone].    Diagnosis: HTN (hypertension)  Assessment and Plan of Treatment: Hypertension, commonly called high blood pressure, is when the force of blood pumping through your arteries is too strong. Hypertension forces your heart to work harder to pump blood. Your arteries may become narrow or stiff. Having untreated or uncontrolled hypertension for a long period of time can cause heart attack, stroke, kidney disease, and other problems.    Please continue Entresto 97/103 mg twice a day, spironolactone 25 mg once a day, and metoprolol succinate 50 mg once a day, as listed to keep your blood pressure controlled. For blood pressure that is too high or too low please see your doctor or go to the emergency room as necessary. Please continue to follow up with your cardiologist or primary care physician in order to discuss lifestyle modifications or the initiation of additional blood pressure lowering medications.    Diagnosis: COPD without exacerbation  Assessment and Plan of Treatment: You have a history of COPD. Please continue taking your medications, including your albuterol inhaler.  If you develop shortness of breath, fever, chest pain, cough, or any other concerning symptoms please seek immediate medical attention.    Diagnosis: Dyslipidemia  Assessment and Plan of Treatment: Please continue atorvastatin 40 mg once a day at bedtime to keep your cholesterol low. High cholesterol contributes to heart disease.    Diagnosis: Thyroid nodule  Assessment and Plan of Treatment: You were found to have a thyroid nodule on an ultrasound in February 2023 and it was recommended that you follow up for possible biopsy. Please follow this up with either your endocrinologist or a [facility] endocrinologist; you may call [phone] to schedule an appointment.    Diagnosis: Encounter for smoking cessation counseling  Assessment and Plan of Treatment: Smoking cessation is one of the most important actions people who smoke can take to reduce their risk for cardiovascular disease.  This is true for all people who smoke, regardless of age or smoking duration and intensity.  The cardiovascular benefits of smoking cessation include: reduces markers of inflammation and risk of blood clots. Improves high-density lipoprotein cholesterol (HDL-C) levels. Reduces the risk of disease, recurent events, and death from cardiovascular disease. Reduces the risk of disease and death from stroke. Reduces the risk of abdominal aortic aneurysm, with risk reduction increasing with time since cessation. May reduce the risk of atrial fibrillation, sudden cardiac death, heart failure, venous thromboembolism, and peripheral arterial disease.     PRINCIPAL DISCHARGE DIAGNOSIS  Diagnosis: Acute on chronic systolic congestive heart failure  Assessment and Plan of Treatment: You have a weak heart, also known as Congestive Heart Failure (CHF). Heart failure is a condition in which the heart does not pump or fill with blood well. As a result, the heart lags behind in its job of moving blood throughout the body. This can lead to symptoms such as swelling, trouble breathing, and feeling tired. Your Ejection Fraction (EF) is 23%; a normal EF is 55-60%.  - Please continue Lasix 40 mg twice a day to prevent fluid build up in the body. When you have lost 3 pounds, please switch to Lasix 40 mg once a day.   - Please continue Entresto 97/103 mg twice a day, spironolactone 25 mg once a day, and metoprolol succinate 50 mg once a day to prevent advancement of your heart disease.   - Your ICD was interrogated on 5/6/25 by the electrophysiology team, and it showed normal device function.   - Avoid drinking more than 1.5L of fluid daily and maintain a low salt diet (max 2grams daily).  - Please weigh yourself daily, for any significant increases in daily weight of 2lbs/day or 5lbs/week with associated swelling in the legs or abdomen and/or shortness of breath, please call your doctor or go to the emergency room.  -Follow up with Dr. Montoya at your appointment scheduled for 5/21/25.      SECONDARY DISCHARGE DIAGNOSES  Diagnosis: CAD (coronary artery disease)  Assessment and Plan of Treatment: You have a history of coronary artery disease, or disease in the arteries that supply blood to your heart. Please continue your home Aspirin 81 mg once a day, Plavix 75 mg once a day, and atorvastatin 40 mg once a day. Aspirin and Plavix can put you at increased risk of bleeding; please avoid NSAIDS (such as Motrin, Advil, Ibuprofen, Naproxen, or Aleve, as these medications may further your risk of bleeding). Please continue to follow up with your cardiologist.    Diagnosis: DM2 (diabetes mellitus, type 2)  Assessment and Plan of Treatment: Your Hemoglobin A1c is 12% and your goal A1c is less than 7.0%. This number measures your average blood sugar level over the last three months.  Please start Lantus Solotar 10 units at bedtime, and Admelog 8 units three times a day with meals as listed for diabetes. Maintain a low carbohydrate, low sugar diet, exercise, monitor your fingerstick blood sugars regarly and follow up with your Endocrinologist/Primary Care Doctor.   You may follow up with a [facility] endocrinologist by calling [phone].  If you want to follow up with a [facility] diabetologist on [place]: Dr. Nico Astorga, [address] [place] [address]. Phone: [phone].    Diagnosis: HTN (hypertension)  Assessment and Plan of Treatment: Hypertension, commonly called high blood pressure, is when the force of blood pumping through your arteries is too strong. Hypertension forces your heart to work harder to pump blood. Your arteries may become narrow or stiff. Having untreated or uncontrolled hypertension for a long period of time can cause heart attack, stroke, kidney disease, and other problems.    Please continue Entresto 97/103 mg twice a day, spironolactone 25 mg once a day, and metoprolol succinate 50 mg once a day, as listed to keep your blood pressure controlled. For blood pressure that is too high or too low please see your doctor or go to the emergency room as necessary. Please continue to follow up with your cardiologist or primary care physician in order to discuss lifestyle modifications or the initiation of additional blood pressure lowering medications.    Diagnosis: COPD without exacerbation  Assessment and Plan of Treatment: You have a history of COPD. Please continue taking your medications, including your albuterol inhaler.  If you develop shortness of breath, fever, chest pain, cough, or any other concerning symptoms please seek immediate medical attention.    Diagnosis: Dyslipidemia  Assessment and Plan of Treatment: Please continue atorvastatin 40 mg once a day at bedtime to keep your cholesterol low. High cholesterol contributes to heart disease.    Diagnosis: Thyroid nodule  Assessment and Plan of Treatment: You were found to have a thyroid nodule on an ultrasound in February 2023 and it was recommended that you follow up for possible biopsy. Please follow this up with either your endocrinologist or a [facility] endocrinologist; you may call [phone] to schedule an appointment.    Diagnosis: Encounter for smoking cessation counseling  Assessment and Plan of Treatment: Smoking cessation is one of the most important actions people who smoke can take to reduce their risk for cardiovascular disease.  This is true for all people who smoke, regardless of age or smoking duration and intensity.  The cardiovascular benefits of smoking cessation include: reduces markers of inflammation and risk of blood clots. Improves high-density lipoprotein cholesterol (HDL-C) levels. Reduces the risk of disease, recurent events, and death from cardiovascular disease. Reduces the risk of disease and death from stroke. Reduces the risk of abdominal aortic aneurysm, with risk reduction increasing with time since cessation. May reduce the risk of atrial fibrillation, sudden cardiac death, heart failure, venous thromboembolism, and peripheral arterial disease.     PRINCIPAL DISCHARGE DIAGNOSIS  Diagnosis: Acute on chronic systolic congestive heart failure  Assessment and Plan of Treatment: You have a weak heart, also known as Congestive Heart Failure (CHF). Heart failure is a condition in which the heart does not pump or fill with blood well. As a result, the heart lags behind in its job of moving blood throughout the body. This can lead to symptoms such as swelling, trouble breathing, and feeling tired. Your Ejection Fraction (EF) is 23%; a normal EF is 55-60%.  - Please continue Lasix 40 mg twice a day to prevent fluid build up in the body. When you have lost 3 pounds, please switch to Lasix 40 mg once a day. Your discharge weight was 194.4 lbs.   - Please continue Entresto 97/103 mg twice a day, spironolactone 25 mg once a day, and metoprolol succinate 50 mg once a day to prevent advancement of your heart disease.   - Your ICD was interrogated on 5/6/25 by the electrophysiology team, and it showed normal device function.   - Avoid drinking more than 1.5L of fluid daily and maintain a low salt diet (max 2grams daily).  - Please weigh yourself daily, for any significant increases in daily weight of 2lbs/day or 5lbs/week with associated swelling in the legs or abdomen and/or shortness of breath, please call your doctor or go to the emergency room.  -Follow up with Dr. Montoya at your appointment scheduled for 5/21/25.      SECONDARY DISCHARGE DIAGNOSES  Diagnosis: CAD (coronary artery disease)  Assessment and Plan of Treatment: You have a history of coronary artery disease, or disease in the arteries that supply blood to your heart. Please continue your home Aspirin 81 mg once a day, Plavix 75 mg once a day, and atorvastatin 40 mg once a day. Aspirin and Plavix can put you at increased risk of bleeding; please avoid NSAIDS (such as Motrin, Advil, Ibuprofen, Naproxen, or Aleve, as these medications may further your risk of bleeding). Please continue to follow up with your cardiologist.    Diagnosis: DM2 (diabetes mellitus, type 2)  Assessment and Plan of Treatment: Your Hemoglobin A1c is 12% and your goal A1c is less than 7.0%. This number measures your average blood sugar level over the last three months.  Please start Lantus Solotar 10 units at bedtime, and Admelog 8 units three times a day with meals as listed for diabetes. Maintain a low carbohydrate, low sugar diet, exercise, monitor your fingerstick blood sugars regarly and follow up with your Endocrinologist/Primary Care Doctor.   You may follow up with a [facility] endocrinologist by calling [phone].  If you want to follow up with a [facility] diabetologist on [place]: Dr. Nico Astorga, [address] [place] [address]. Phone: [phone].    Diagnosis: HTN (hypertension)  Assessment and Plan of Treatment: Hypertension, commonly called high blood pressure, is when the force of blood pumping through your arteries is too strong. Hypertension forces your heart to work harder to pump blood. Your arteries may become narrow or stiff. Having untreated or uncontrolled hypertension for a long period of time can cause heart attack, stroke, kidney disease, and other problems.    Please continue Entresto 97/103 mg twice a day, spironolactone 25 mg once a day, and metoprolol succinate 50 mg once a day, as listed to keep your blood pressure controlled. For blood pressure that is too high or too low please see your doctor or go to the emergency room as necessary. Please continue to follow up with your cardiologist or primary care physician in order to discuss lifestyle modifications or the initiation of additional blood pressure lowering medications.    Diagnosis: COPD without exacerbation  Assessment and Plan of Treatment: You have a history of COPD. Please continue taking your medications, including your albuterol inhaler.  If you develop shortness of breath, fever, chest pain, cough, or any other concerning symptoms please seek immediate medical attention.    Diagnosis: Dyslipidemia  Assessment and Plan of Treatment: Please continue atorvastatin 40 mg once a day at bedtime to keep your cholesterol low. High cholesterol contributes to heart disease.    Diagnosis: Thyroid nodule  Assessment and Plan of Treatment: You were found to have a thyroid nodule on an ultrasound in February 2023 and it was recommended that you follow up for possible biopsy. Please follow this up with either your endocrinologist or a [facility] endocrinologist; you may call [phone] to schedule an appointment.    Diagnosis: Encounter for smoking cessation counseling  Assessment and Plan of Treatment: Smoking cessation is one of the most important actions people who smoke can take to reduce their risk for cardiovascular disease.  This is true for all people who smoke, regardless of age or smoking duration and intensity.  The cardiovascular benefits of smoking cessation include: reduces markers of inflammation and risk of blood clots. Improves high-density lipoprotein cholesterol (HDL-C) levels. Reduces the risk of disease, recurent events, and death from cardiovascular disease. Reduces the risk of disease and death from stroke. Reduces the risk of abdominal aortic aneurysm, with risk reduction increasing with time since cessation. May reduce the risk of atrial fibrillation, sudden cardiac death, heart failure, venous thromboembolism, and peripheral arterial disease.     PRINCIPAL DISCHARGE DIAGNOSIS  Diagnosis: Acute on chronic systolic congestive heart failure  Assessment and Plan of Treatment: You have a weak heart, also known as Congestive Heart Failure (CHF). Heart failure is a condition in which the heart does not pump or fill with blood well. As a result, the heart lags behind in its job of moving blood throughout the body. This can lead to symptoms such as swelling, trouble breathing, and feeling tired. Your Ejection Fraction (EF) is 23%; a normal EF is 55-60%.  - Please continue Lasix 40 mg twice a day to prevent fluid build up in the body. When you have lost 3 pounds, please switch to Lasix 40 mg once a day. Your discharge weight was 194.4 lbs. Notify the heart failure team when you switch to Lasix 40 mg once a day.  - Please continue Entresto 97/103 mg twice a day, spironolactone 25 mg once a day, and metoprolol succinate 50 mg once a day to prevent advancement of your heart disease.   - Your ICD was interrogated on 5/6/25 by the electrophysiology team, and it showed normal device function.   - Avoid drinking more than 1.5L of fluid daily and maintain a low salt diet (max 2grams daily).  - Please weigh yourself daily, for any significant increases in daily weight of 2lbs/day or 5lbs/week with associated swelling in the legs or abdomen and/or shortness of breath, please call your doctor or go to the emergency room.  -Follow up with Dr. Montoya at your appointment scheduled for 5/21/25.      SECONDARY DISCHARGE DIAGNOSES  Diagnosis: CAD (coronary artery disease)  Assessment and Plan of Treatment: You have a history of coronary artery disease, or disease in the arteries that supply blood to your heart. Please continue your home Aspirin 81 mg once a day, Plavix 75 mg once a day, and atorvastatin 40 mg once a day. Aspirin and Plavix can put you at increased risk of bleeding; please avoid NSAIDS (such as Motrin, Advil, Ibuprofen, Naproxen, or Aleve, as these medications may further your risk of bleeding). Please continue to follow up with your cardiologist.    Diagnosis: DM2 (diabetes mellitus, type 2)  Assessment and Plan of Treatment: Your Hemoglobin A1c is 12% and your goal A1c is less than 7.0%. This number measures your average blood sugar level over the last three months.  Please start Lantus Solotar 10 units at bedtime, and Admelog 8 units three times a day with meals as listed for diabetes. Maintain a low carbohydrate, low sugar diet, exercise, monitor your fingerstick blood sugars regarly and follow up with your Endocrinologist/Primary Care Doctor.   You may follow up with a [facility] endocrinologist by calling [phone].  If you want to follow up with a [facility] diabetologist on [place]: Dr. Nico Astorga, [address] [place] [address]. Phone: [phone].    Diagnosis: HTN (hypertension)  Assessment and Plan of Treatment: Hypertension, commonly called high blood pressure, is when the force of blood pumping through your arteries is too strong. Hypertension forces your heart to work harder to pump blood. Your arteries may become narrow or stiff. Having untreated or uncontrolled hypertension for a long period of time can cause heart attack, stroke, kidney disease, and other problems.    Please continue Entresto 97/103 mg twice a day, spironolactone 25 mg once a day, and metoprolol succinate 50 mg once a day, as listed to keep your blood pressure controlled. For blood pressure that is too high or too low please see your doctor or go to the emergency room as necessary. Please continue to follow up with your cardiologist or primary care physician in order to discuss lifestyle modifications or the initiation of additional blood pressure lowering medications.    Diagnosis: COPD without exacerbation  Assessment and Plan of Treatment: You have a history of COPD. Please continue taking your medications, including your albuterol inhaler.  If you develop shortness of breath, fever, chest pain, cough, or any other concerning symptoms please seek immediate medical attention.    Diagnosis: Dyslipidemia  Assessment and Plan of Treatment: Please continue atorvastatin 40 mg once a day at bedtime to keep your cholesterol low. High cholesterol contributes to heart disease.    Diagnosis: Thyroid nodule  Assessment and Plan of Treatment: You were found to have a thyroid nodule on an ultrasound in February 2023 and it was recommended that you follow up for possible biopsy. Please follow this up with either your endocrinologist or a [facility] endocrinologist; you may call [phone] to schedule an appointment.    Diagnosis: Encounter for smoking cessation counseling  Assessment and Plan of Treatment: Smoking cessation is one of the most important actions people who smoke can take to reduce their risk for cardiovascular disease.  This is true for all people who smoke, regardless of age or smoking duration and intensity.  The cardiovascular benefits of smoking cessation include: reduces markers of inflammation and risk of blood clots. Improves high-density lipoprotein cholesterol (HDL-C) levels. Reduces the risk of disease, recurent events, and death from cardiovascular disease. Reduces the risk of disease and death from stroke. Reduces the risk of abdominal aortic aneurysm, with risk reduction increasing with time since cessation. May reduce the risk of atrial fibrillation, sudden cardiac death, heart failure, venous thromboembolism, and peripheral arterial disease.     PRINCIPAL DISCHARGE DIAGNOSIS  Diagnosis: Acute on chronic systolic congestive heart failure  Assessment and Plan of Treatment: You have a weak heart, also known as Congestive Heart Failure (CHF). Heart failure is a condition in which the heart does not pump or fill with blood well. As a result, the heart lags behind in its job of moving blood throughout the body. This can lead to symptoms such as swelling, trouble breathing, and feeling tired. Your Ejection Fraction (EF) is 23%; a normal EF is 55-60%.  - Please continue Lasix 40 mg twice a day to prevent fluid build up in the body. When you have lost 3 pounds, please switch to Lasix 40 mg once a day. Your discharge weight was 194.4 lbs. Notify the heart failure team when you switch to Lasix 40 mg once a day.  - Please continue Entresto 97/103 mg twice a day, spironolactone 25 mg once a day, and metoprolol succinate 50 mg once a day to prevent advancement of your heart disease.   - Your ICD was interrogated on 5/6/25 by the electrophysiology team, and it showed normal device function.   - Avoid drinking more than 1.5L of fluid daily and maintain a low salt diet (max 2grams daily).  - Please weigh yourself daily, for any significant increases in daily weight of 2lbs/day or 5lbs/week with associated swelling in the legs or abdomen and/or shortness of breath, please call your doctor or go to the emergency room.  -Follow up with Dr. Montoya at your appointment scheduled for 5/21/25.      SECONDARY DISCHARGE DIAGNOSES  Diagnosis: CAD (coronary artery disease)  Assessment and Plan of Treatment: You have a history of coronary artery disease, or disease in the arteries that supply blood to your heart. Please continue your home Aspirin 81 mg once a day, Plavix 75 mg once a day, and atorvastatin 40 mg once a day. Aspirin and Plavix can put you at increased risk of bleeding; please avoid NSAIDS (such as Motrin, Advil, Ibuprofen, Naproxen, or Aleve, as these medications may further your risk of bleeding). Please continue to follow up with your cardiologist.    Diagnosis: DM2 (diabetes mellitus, type 2)  Assessment and Plan of Treatment: Your Hemoglobin A1c is 12% and your goal A1c is less than 7.0%. This number measures your average blood sugar level over the last three months.  Please start Lantus Solotar 10 units at bedtime, and Admelog 8 units three times a day with meals as listed for diabetes. Maintain a low carbohydrate, low sugar diet, exercise, monitor your fingerstick blood sugars regarly and follow up with your Endocrinologist/Primary Care Doctor.   You may follow up with a [facility] endocrinologist by calling [phone].  If you want to follow up with a [facility] diabetologist on [place]: Dr. Nico Astorga, [address] [place] [address]. Phone: [phone].    Diagnosis: HTN (hypertension)  Assessment and Plan of Treatment: Hypertension, commonly called high blood pressure, is when the force of blood pumping through your arteries is too strong. Hypertension forces your heart to work harder to pump blood. Your arteries may become narrow or stiff. Having untreated or uncontrolled hypertension for a long period of time can cause heart attack, stroke, kidney disease, and other problems.    Please continue Entresto 97/103 mg twice a day, spironolactone 25 mg once a day, and metoprolol succinate 50 mg once a day, as listed to keep your blood pressure controlled. For blood pressure that is too high or too low please see your doctor or go to the emergency room as necessary. Please continue to follow up with your cardiologist or primary care physician in order to discuss lifestyle modifications or the initiation of additional blood pressure lowering medications.    Diagnosis: COPD without exacerbation  Assessment and Plan of Treatment: You have a history of COPD. Please continue taking your medications, including your albuterol inhaler.  If you develop shortness of breath, fever, chest pain, cough, or any other concerning symptoms please seek immediate medical attention.    Diagnosis: Dyslipidemia  Assessment and Plan of Treatment: Please continue atorvastatin 40 mg once a day at bedtime to keep your cholesterol low. High cholesterol contributes to heart disease.    Diagnosis: Thyroid nodule  Assessment and Plan of Treatment: You were found to have a thyroid nodule on an ultrasound in February 2023 and it was recommended that you follow up for possible biopsy. Please follow this up with either your endocrinologist or a [facility] endocrinologist; you may call [phone] to schedule an appointment.    Diagnosis: Encounter for smoking cessation counseling  Assessment and Plan of Treatment: Smoking cessation is one of the most important actions people who smoke can take to reduce their risk for cardiovascular disease.  This is true for all people who smoke, regardless of age or smoking duration and intensity.  The cardiovascular benefits of smoking cessation include: reduces markers of inflammation and risk of blood clots. Improves high-density lipoprotein cholesterol (HDL-C) levels. Reduces the risk of disease, recurent events, and death from cardiovascular disease. Reduces the risk of disease and death from stroke. Reduces the risk of abdominal aortic aneurysm, with risk reduction increasing with time since cessation. May reduce the risk of atrial fibrillation, sudden cardiac death, heart failure, venous thromboembolism, and peripheral arterial disease.    Diagnosis: Obstructive sleep apnea  Assessment and Plan of Treatment: Please follow up with your primary care provider to schedule a sleep study.     PRINCIPAL DISCHARGE DIAGNOSIS  Diagnosis: Acute on chronic systolic congestive heart failure  Assessment and Plan of Treatment: You have a weak heart, also known as Congestive Heart Failure (CHF). Heart failure is a condition in which the heart does not pump or fill with blood well. As a result, the heart lags behind in its job of moving blood throughout the body. This can lead to symptoms such as swelling, trouble breathing, and feeling tired. Your Ejection Fraction (EF) is 23%; a normal EF is 55-60%.  - Please continue Lasix 40 mg twice a day to prevent fluid build up in the body. When you have lost 3 pounds, please switch to Lasix 40 mg once a day. Your discharge weight was 194.4 lbs. Notify the heart failure team when you switch to Lasix 40 mg once a day.  - Please continue Entresto 97/103 mg twice a day, spironolactone 25 mg once a day, and metoprolol succinate 50 mg once a day to prevent advancement of your heart disease.   - Your ICD was interrogated on 5/6/25 by the electrophysiology team, and it showed normal device function.   - Avoid drinking more than 1.5L of fluid daily and maintain a low salt diet (max 2grams daily).  - Please weigh yourself daily, for any significant increases in daily weight of 2lbs/day or 5lbs/week with associated swelling in the legs or abdomen and/or shortness of breath, please call your doctor or go to the emergency room.  -Follow up with Dr. Montoya at your appointment scheduled for 5/21/25.      SECONDARY DISCHARGE DIAGNOSES  Diagnosis: CAD (coronary artery disease)  Assessment and Plan of Treatment: You have a history of coronary artery disease, or disease in the arteries that supply blood to your heart. Please continue your home Aspirin 81 mg once a day, Plavix 75 mg once a day, and atorvastatin 40 mg once a day. Aspirin and Plavix can put you at increased risk of bleeding; please avoid NSAIDS (such as Motrin, Advil, Ibuprofen, Naproxen, or Aleve, as these medications may further your risk of bleeding). Please continue to follow up with your cardiologist.    Diagnosis: DM2 (diabetes mellitus, type 2)  Assessment and Plan of Treatment: Your Hemoglobin A1c is 12% and your goal A1c is less than 7.0%. This number measures your average blood sugar level over the last three months.  Please start insulin glargine 10 units at bedtime, and Novolog 8 units three times a day with meals as listed for diabetes. Maintain a low carbohydrate, low sugar diet, exercise, monitor your fingerstick blood sugars regarly and follow up with your Endocrinologist/Primary Care Doctor.   You may follow up with a [facility] endocrinologist by calling [phone].  If you want to follow up with a [facility] diabetologist on [place]: Dr. Nico Astorga, [address] [id number]. Phone: [phone].    Diagnosis: HTN (hypertension)  Assessment and Plan of Treatment: Hypertension, commonly called high blood pressure, is when the force of blood pumping through your arteries is too strong. Hypertension forces your heart to work harder to pump blood. Your arteries may become narrow or stiff. Having untreated or uncontrolled hypertension for a long period of time can cause heart attack, stroke, kidney disease, and other problems.    Please continue Entresto 97/103 mg twice a day, spironolactone 25 mg once a day, and metoprolol succinate 50 mg once a day, as listed to keep your blood pressure controlled. For blood pressure that is too high or too low please see your doctor or go to the emergency room as necessary. Please continue to follow up with your cardiologist or primary care physician in order to discuss lifestyle modifications or the initiation of additional blood pressure lowering medications.    Diagnosis: COPD without exacerbation  Assessment and Plan of Treatment: You have a history of COPD. Please continue taking your medications, including your albuterol inhaler.  If you develop shortness of breath, fever, chest pain, cough, or any other concerning symptoms please seek immediate medical attention.    Diagnosis: Dyslipidemia  Assessment and Plan of Treatment: Please continue atorvastatin 40 mg once a day at bedtime to keep your cholesterol low. High cholesterol contributes to heart disease.    Diagnosis: Thyroid nodule  Assessment and Plan of Treatment: You were found to have a thyroid nodule on an ultrasound in February 2023 and it was recommended that you follow up for possible biopsy. Please follow this up with either your endocrinologist or a [facility] endocrinologist; you may call [phone] to schedule an appointment.    Diagnosis: Encounter for smoking cessation counseling  Assessment and Plan of Treatment: Smoking cessation is one of the most important actions people who smoke can take to reduce their risk for cardiovascular disease.  This is true for all people who smoke, regardless of age or smoking duration and intensity.  The cardiovascular benefits of smoking cessation include: reduces markers of inflammation and risk of blood clots. Improves high-density lipoprotein cholesterol (HDL-C) levels. Reduces the risk of disease, recurent events, and death from cardiovascular disease. Reduces the risk of disease and death from stroke. Reduces the risk of abdominal aortic aneurysm, with risk reduction increasing with time since cessation. May reduce the risk of atrial fibrillation, sudden cardiac death, heart failure, venous thromboembolism, and peripheral arterial disease.    Diagnosis: Obstructive sleep apnea  Assessment and Plan of Treatment: Please follow up with your primary care provider to schedule a sleep study.

## 2025-05-05 NOTE — DISCHARGE NOTE PROVIDER - CARE PROVIDERS DIRECT ADDRESSES
[email or web address]

## 2025-05-05 NOTE — DISCHARGE NOTE PROVIDER - HOSPITAL COURSE
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 Labs notable for elevated BNP.    While admitted to cardiac telemetry, patient was diuresed with IV lasix to euvolemia; dry weight ___ , net negative ___. Patient transitioned to home diuretic Lasix 40 mg PO QD. Patient continued on GDMT regimen: ____________________. Patient is not on farxiga due to "skin reaction" that did not require medical evaluation; will defer potential re-initiation to outpatient setting. Advanced HF consulted, _________.     EP consulted for ICD interrogation 5/5/25 which revealed ______.     Endocrine consulted for A1c 12% in setting of known T2DM, recommended ____.     Referred for sleep study to r/o NANCY in alignment with reported outpatient recommendation.     Discharge medications: __________    No significant events on telemetry overnight. Repeat EKG without ischemic changes. Patient has been medically cleared for discharge as per  ________. Patient has been given appropriate discharge instructions including medication regimen, access site management and follow up. Medications that patient needs refills on have been e-prescribed to preferred pharmacy.     GLP-1 receptor agonist/SGLT2 inhibitor meds discussed w/ patients and encouraged to discuss further with PMD or Endocrinologist at next visit.      Pt discharge copies detail cardiovascular history, medications, testing/treatments, OR patient has created a patient portal account and instructed to provide their records at their 1st appointment. 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 Labs notable for elevated BNP.    While admitted to cardiac telemetry, patient was diuresed with IV lasix to euvolemia; dry weight ___ , net negative ___. Patient transitioned to home diuretic Lasix 40 mg PO QD. Patient continued on GDMT regimen: ____________________. Patient is not on Farxiga due to "skin reaction" that did not require medical evaluation; will defer potential re-initiation to outpatient setting. Advanced HF consulted, _________.     EP consulted for ICD interrogation 5/5/25 which revealed ______.     Endocrine consulted for A1c 12% in setting of known T2DM, recommended ____.     Referred for sleep study to r/o NANCY in alignment with reported outpatient recommendation.     Discharge medications: __________    No significant events on telemetry overnight. Repeat EKG without ischemic changes. Patient has been medically cleared for discharge as per  ________. Patient has been given appropriate discharge instructions including medication regimen, access site management and follow up. Medications that patient needs refills on have been e-prescribed to preferred pharmacy.     GLP-1 receptor agonist/SGLT2 inhibitor meds discussed w/ patients and encouraged to discuss further with PMD or Endocrinologist at next visit.      Pt discharge copies detail cardiovascular history, medications, testing/treatments, OR patient has created a patient portal account and instructed to provide their records at their 1st appointment.   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 Labs notable for elevated BNP.    While admitted to cardiac telemetry, patient was diuresed with IV lasix to euvolemia; dry weight ___ , net negative ___. Patient transitioned to home diuretic Lasix 40 mg PO QD. Patient continued on GDMT regimen: ____________________. Patient is not on Farxiga due to "skin reaction" that did not require medical evaluation; will defer potential re-initiation to outpatient setting. Advanced HF consulted, _________.     EP consulted for ICD interrogation 5/5/25 which revealed normal ICD function.    Endocrine consulted for A1c 12% in setting of known T2DM, recommended decreased lantus to 12 units at bedtime, lispro to 5 units before each meal. He prefers to follow with a [facility] diabetologist in [place] on discharge. Per endo: advised him to ask his [place] cardiologist for endocrine referral.    Referred for sleep study to r/o NANCY in alignment with reported outpatient recommendation.     Discharge medications:   - Lasix 40 mg PO qd  - GDMT: Toprol XL 50 mg PO qd, Entresto 97/103 mg PO BID, spironolactone 25 mg PO qd  - DAPT: ASA 81 mg PO qd, Plavix 75 mg PO qd  - Atorvastatin 40 mg PO qhs    No significant events on telemetry overnight. Repeat EKG without ischemic changes. Patient has been medically cleared for discharge as per Dr. Gibbons. Patient has been given appropriate discharge instructions including medication regimen, access site management and follow up. Medications that patient needs refills on have been e-prescribed to preferred pharmacy.     GLP-1 receptor agonist/SGLT2 inhibitor meds discussed w/ patients and encouraged to discuss further with PMD or Endocrinologist at next visit.      Pt discharge copies detail cardiovascular history, medications, testing/treatments, OR patient has created a patient portal account and instructed to provide their records at their 1st appointment.   64M smoker w COPD, DMT2, Ischemic CMP HFrEF 30% s/p ICD who p/w worsening LE edema, MARTINES x 1 week. Labs notable for elevated BNP.    While admitted to cardiac telemetry, patient was diuresed with IV lasix to euvolemia; dry weight 194.4 lbs, net negative -5.7L. Pt transitioned to PO diuresis. Patient is not on Farxiga due to "skin reaction" that did not require medical evaluation; will defer potential re-initiation to outpatient setting. Advanced HF consulted, optimized diuresis and GDMT regimen as listed below.     EP consulted for ICD interrogation 5/5/25 which revealed normal ICD function.    Endocrine consulted for A1c 12% in setting of known T2DM, recommended decreased lantus to 12 units at bedtime, lispro to 5 units before each meal. He prefers to follow with a [facility] diabetologist in [place] on discharge. Per endo: advised him to ask his [place] cardiologist for endocrine referral.    Referred for sleep study to r/o NANCY in alignment with reported outpatient recommendation.     CV discharge medications:   - Diuresis: Lasix 40 mg PO BID until on Lasix 40 mg BID and once weight reduces 3 lbs, to return to 40 mg QD and notify [facility] team  - GDMT: Toprol XL 50 mg PO qd, Entresto 97/103 mg PO BID, spironolactone 25 mg PO qd  - DAPT: ASA 81 mg PO qd, Plavix 75 mg PO qd  - Atorvastatin 40 mg PO qhs    Endocrine d/c meds:  - Lantus 10U qhs  - Lispro 8U TID before meals -----> medication not covered transitioned to Novolog 8U TID before meals (confirmed w/ endocrine that TIC is OK).     No significant events on telemetry overnight. Repeat EKG without ischemic changes. Patient has been medically cleared for discharge as per Dr. Gibbons. Patient has been given appropriate discharge instructions including medication regimen, access site management and follow up. Medications that patient needs refills on have been e-prescribed to preferred pharmacy. Pt scheduled for f/u w/ Dr. Montoya on 5/21/25.     GLP-1 receptor agonist/SGLT2 inhibitor meds discussed w/ patients and encouraged to discuss further with PMD or Endocrinologist at next visit.      Pt discharge copies detail cardiovascular history, medications, testing/treatments, OR patient has created a patient portal account and instructed to provide their records at their 1st appointment.   64M smoker w COPD, DMT2, Ischemic CMP HFrEF 30% s/p ICD who p/w worsening LE edema, MARTINES x 1 week. Labs notable for elevated BNP.    While admitted to cardiac telemetry, patient was diuresed with IV lasix to euvolemia; dry weight 194.4 lbs, net negative -5.7L. Pt transitioned to PO diuresis. Patient is not on Farxiga due to "skin reaction" that did not require medical evaluation; will defer potential re-initiation to outpatient setting. Advanced HF consulted, optimized diuresis and GDMT regimen as listed below.     EP consulted for ICD interrogation 5/5/25 which revealed normal ICD function.    Endocrine consulted for A1c 12% in setting of known T2DM, recommended decreased lantus to 12 units at bedtime, lispro to 5 units before each meal. He prefers to follow with a [facility] diabetologist in [place] on discharge. Per endo: advised him to ask his [place] cardiologist for endocrine referral.    Referred for sleep study to r/o NANCY in alignment with reported outpatient recommendation.     CV discharge medications:   - Diuresis: Lasix 40 mg PO BID until on Lasix 40 mg BID and once weight reduces 3 lbs, to return to 40 mg QD and notify [facility] team  - GDMT: Toprol XL 50 mg PO qd, Entresto 97/103 mg PO BID, spironolactone 25 mg PO qd  - DAPT: ASA 81 mg PO qd, Plavix 75 mg PO qd  - Atorvastatin 40 mg PO qhs    Endocrine d/c meds:  - Lantus 10U qhs  - Lispro 8U TID before meals -----> Admelog not covered transitioned to Novolog 8U TID before meals (confirmed w/ endocrine that TIC is OK).     *Confirmed w/ Vivo that pt picked up correct meds as listed above (including Novolog rather than Admelog).     No significant events on telemetry overnight. Repeat EKG without ischemic changes. Patient has been medically cleared for discharge as per Dr. Gibbons. Patient has been given appropriate discharge instructions including medication regimen, access site management and follow up. Medications that patient needs refills on have been e-prescribed to preferred pharmacy. Pt scheduled for f/u w/ Dr. Montoya on 5/21/25.     GLP-1 receptor agonist/SGLT2 inhibitor meds discussed w/ patients and encouraged to discuss further with PMD or Endocrinologist at next visit.      Pt discharge copies detail cardiovascular history, medications, testing/treatments, OR patient has created a patient portal account and instructed to provide their records at their 1st appointment.

## 2025-05-05 NOTE — PROGRESS NOTE ADULT - PROBLEM SELECTOR PLAN 3
- A1c 12%  - Continue: Admelog 5 units SC TID; Glargine 24 units w/ 20% reduction (TIC w/ Tresiba)   - Consult endo, f/u recs; DM educator; outpatient follow up    ##Thyroid Nodule  TSH 0.371; Tota T4/FT4 1.460  - Thyroid US (2/1/23) enlarged multinodular thyroid gland; meet criteria for FNA  - Endo consulted, recommends outpatient monitoring

## 2025-05-05 NOTE — DISCHARGE NOTE PROVIDER - NSDCMRMEDTOKEN_GEN_ALL_CORE_FT
Albuterol (Eqv-ProAir HFA) 90 mcg/inh inhalation aerosol: 2 puff(s) inhaled every 6 hours as needed for  shortness of breath and/or wheezing  alcohol swabs: Apply topically to affected area 4 times a day  Aspirin Enteric Coated 81 mg oral delayed release tablet: 1 tab(s) orally once a day  atorvastatin 40 mg oral tablet: 1 tab(s) orally once a day (at bedtime)  Entresto 97 mg-103 mg oral tablet: 1 tab(s) orally 2 times a day  metoprolol succinate 25 mg oral tablet, extended release: 1 tab(s) orally 2 times a day  Plavix 75 mg oral tablet: 1 tab(s) orally once a day  Tresiba FlexTouch 100 units/mL subcutaneous solution: subcutaneous once a day (at bedtime)   Albuterol (Eqv-ProAir HFA) 90 mcg/inh inhalation aerosol: 2 puff(s) inhaled every 6 hours as needed for  shortness of breath and/or wheezing  alcohol swabs: Apply topically to affected area 4 times a day  Aspirin Enteric Coated 81 mg oral delayed release tablet: 1 tab(s) orally once a day  atorvastatin 40 mg oral tablet: 1 tab(s) orally once a day (at bedtime)  Entresto 97 mg-103 mg oral tablet: 1 tab(s) orally 2 times a day  Freestyle Harsh 3 Sensors: Change every 2 weeks  metoprolol succinate 25 mg oral tablet, extended release: 1 tab(s) orally 2 times a day  Plavix 75 mg oral tablet: 1 tab(s) orally once a day  Tresiba FlexTouch 100 units/mL subcutaneous solution: subcutaneous once a day (at bedtime)   Albuterol (Eqv-ProAir HFA) 90 mcg/inh inhalation aerosol: 2 puff(s) inhaled every 6 hours as needed for  shortness of breath and/or wheezing  alcohol swabs: Apply topically to affected area 4 times a day  Aspirin Enteric Coated 81 mg oral delayed release tablet: 1 tab(s) orally once a day  atorvastatin 40 mg oral tablet: 1 tab(s) orally once a day (at bedtime)  Entresto 97 mg-103 mg oral tablet: 1 tab(s) orally 2 times a day  Freestyle Harsh 3 Sensors: Change every 2 weeks  Plavix 75 mg oral tablet: 1 tab(s) orally once a day   Entresto 97 mg-103 mg oral tablet: 1 tab(s) orally 2 times a day   Admelog SoloStar 100 units/mL injectable solution: 8 unit(s) injectable 3 times a day (before meals)  alcohol swabs: Apply topically to affected area 4 times a day  aspirin 81 mg oral delayed release tablet: 1 tab(s) orally once a day  atorvastatin 40 mg oral tablet: 1 tab(s) orally once a day (at bedtime)  Basaglar KwikPen 100 units/mL subcutaneous solution: 10 unit(s) subcutaneous once a day (at bedtime)  Cardiac Rehabilitation: Please attend cardiac rehabilitation 2-3x a week for 12 weeks  clopidogrel 75 mg oral tablet: 1 tab(s) orally once a day  Entresto 97 mg-103 mg oral tablet: 1 tab(s) orally 2 times a day  Freestyle Harsh 3 Sensors: Change every 2 weeks  Freestyle Precision Morris Strips: Test blood sugar four times a day when you see check blood glucose symbol or when symptoms don&#x27;t match Harsh reading.  furosemide 40 mg oral tablet: 1 tab(s) orally 2 times a day Please switch to one time a day when you notice that your daily weight has dropped by 3lbs.  lancets: 1 application subcutaneously 4 times a day  Metoprolol Succinate ER 50 mg oral tablet, extended release: 1 tab(s) orally once a day  spironolactone 25 mg oral tablet: 1 tab(s) orally once a day   Admelog 100 units/mL injectable solution: 8 unit(s) injectable 3 times a day (before meals)  alcohol swabs: Apply topically to affected area 4 times a day  aspirin 81 mg oral delayed release tablet: 1 tab(s) orally once a day  atorvastatin 40 mg oral tablet: 1 tab(s) orally once a day (at bedtime)  Cardiac Rehabilitation: Please attend cardiac rehabilitation 2-3x a week for 12 weeks  clopidogrel 75 mg oral tablet: 1 tab(s) orally once a day  Entresto 97 mg-103 mg oral tablet: 1 tab(s) orally 2 times a day  Freestyle Harsh 3 Sensors: Change every 2 weeks  Freestyle Precision Morris Strips: Test blood sugar four times a day when you see check blood glucose symbol or when symptoms don&#x27;t match Harsh reading.  furosemide 40 mg oral tablet: 1 tab(s) orally 2 times a day Please switch to one time a day when you notice that your daily weight has dropped by 3lbs. Your weight on the day of discharge was 194.4 lbs.  insulin glargine 100 units/mL subcutaneous solution: 10 unit(s) subcutaneous once a day (at bedtime)  lancets: 1 application subcutaneously 4 times a day  Metoprolol Succinate ER 50 mg oral tablet, extended release: 1 tab(s) orally once a day  spironolactone 25 mg oral tablet: 1 tab(s) orally once a day   alcohol swabs: Apply topically to affected area 4 times a day  aspirin 81 mg oral delayed release tablet: 1 tab(s) orally once a day  atorvastatin 40 mg oral tablet: 1 tab(s) orally once a day (at bedtime)  Cardiac Rehabilitation: Please attend cardiac rehabilitation 2-3x a week for 12 weeks  clopidogrel 75 mg oral tablet: 1 tab(s) orally once a day  Entresto 97 mg-103 mg oral tablet: 1 tab(s) orally 2 times a day  Fiasp 100 units/mL injectable solution: 8 unit(s) injectable 3 times a day (before meals)  Freestyle Harsh 3 Sensors: Change every 2 weeks  Freestyle Precision Morris Strips: Test blood sugar four times a day when you see check blood glucose symbol or when symptoms don&#x27;t match Harsh reading.  furosemide 40 mg oral tablet: 1 tab(s) orally 2 times a day Please switch to one time a day when you notice that your daily weight has dropped by 3lbs. Your weight on the day of discharge was 194.4 lbs.  insulin glargine 100 units/mL subcutaneous solution: 10 unit(s) subcutaneous once a day (at bedtime)  lancets: 1 application subcutaneously 4 times a day  Metoprolol Succinate ER 50 mg oral tablet, extended release: 1 tab(s) orally once a day  spironolactone 25 mg oral tablet: 1 tab(s) orally once a day

## 2025-05-05 NOTE — DISCHARGE NOTE PROVIDER - PROVIDER TOKENS
PROVIDER:[TOKEN:[[id number]],FOLLOWUP:[1 week]],PROVIDER:[TOKEN:[[id number]],SCHEDULEDAPPT:[05/06/2025],SCHEDULEDAPPTTIME:[02:00 PM],ESTABLISHEDPATIENT:[T]] PROVIDER:[TOKEN:[[id number]],FOLLOWUP:[1 week]],PROVIDER:[TOKEN:[[id number]],SCHEDULEDAPPT:[05/21/2025],SCHEDULEDAPPTTIME:[01:00 PM],ESTABLISHEDPATIENT:[T]]

## 2025-05-05 NOTE — DISCHARGE NOTE PROVIDER - NSDCFUADDINST_GEN_ALL_CORE_FT
Please continue to follow a heart healthy diet, low in sodium, cholesterol and fats. We recommend a Mediterranean diet:  -Incorporate 2 or more servings per day of vegetables, fruits, and whole grains  -Increase intake of fish and legumes/beans to 2 or more servings per week  -Increase intake of healthy fats, such as olive oil and avocados, and have a handful of nuts/seeds most days  -Reduce red/processed meat consumption to 2 or fewer times per week  Please continue to follow a heart healthy diet, low in sodium, cholesterol and fats. We recommend a Mediterranean diet:  -Incorporate 2 or more servings per day of vegetables, fruits, and whole grains  -Increase intake of fish and legumes/beans to 2 or more servings per week  -Increase intake of healthy fats, such as olive oil and avocados, and have a handful of nuts/seeds most days  -Reduce red/processed meat consumption to 2 or fewer times per week     - SEEK IMMEDIATE MEDICAL CARE IF YOU HAVE ANY OF THE FOLLOWING SYMPTOMS: worsening chest pain, racing heart beat, unexplained jaw/neck/back pain, severe abdominal pain, shortness of breath, dizziness or lightheadedness, fainting, sweaty or clammy skin, vomiting, or coughing up blood. These symptoms may represent a serious problem that is an emergency. Do not wait to see if the symptoms will go away. Get medical help right away. Call 911 and do not drive yourself to the hospital.

## 2025-05-05 NOTE — PROGRESS NOTE ADULT - SUBJECTIVE AND OBJECTIVE BOX
SUBJECTIVE / INTERVAL HPI: Patient was seen and examined this morning. Weekend consult Events of weekend reviewed.   Endocrine is consulted for type 2 diabetes management (chronic, exacerbated).      "He is known to the service from previous admissions, last in July 2024. He was diagnosed with diabetes at 48 years of age and started on insulin at about age 57. He was discharged home last admission on Tresiba 28 units Novolog 12 units with meals, Farxiga 10 mg daily, and Mounjaro 2.5 mg weekly. He was most recently taking only Tresiba 30 units daily. He discontinued Farxiga and Mounjaro after his prescription from the hospital finished. He stopped Novolog approximately two months ago due to a lapsed in his prescription. He is mostly compliant with a diabetic diet, although he has apple juice he states he dilutes.  During this hospital admission he was started on Lantus 24 units at night, Lispro 10 units with meals, and Lispro moderate dose sliding scale. Glucose values  on this regimen.   Of note, he has a history of thyroid nodules without recent monitoring."      INTERPRETATION:  THYROID ULTRASOUND with Doppler dated 2/1/2023 9:04 PM  Findings:  RIGHT LOBE:  Dimensions: 7.5 x 2.8 x 3.2 cm (sagittal x AP x transverse)  Echotexture: Homogenous  Vascularity: Normal  Multiple nodules seen, most suspicious discrete nodule as follows  Location: Right midpole, posterior  Dimensions: 1.7 x 0.9 x 1.3 cm (sagittal x AP x transverse)  Composition: Solid  For solid nodules or for the solid portion of a partially cystic nodule,   does the solid portion have any of the following suspicious features?  -  Yes: Hypoechoic  -  No: Microcalcifications  -  No: Irregular margin (infiltrative or microlobulated)  -  No: Taller than wide (AP dimension > transverse)  -  No: Extrathyroidal extension  -  No: Interrupted rim calcification with soft tissue extrusion  LEFT LOBE:  Dimensions: 8.2 x 3.7 x 4.0 cm (sagittal x AP x transverse)  Echotexture: Homogenous  Vascularity: Normal  Multiple nodules seen, most suspicious discrete nodules as follows:  Location: Left lower pole  Dimensions: 1.9 x 3.2 x 2.8 cm (sagittal x AP x transverse)  Composition: Solid  For solid nodules or for the solid portion of a partially cystic nodule,   does the solid portion have any of the following suspicious features?  -  No: Hypoechoic  -  No: Microcalcifications  -  No: Irregular margin (infiltrative or microlobulated)  -  Yes: Taller than wide (AP dimension > transverse)  -  No: Extrathyroidal extension  -  No: Interrupted rim calcification with soft tissue extrusion  Location: Left mid pole  Dimensions: 2.7 x 1.9 x 2.3 cm (sagittal x AP x transverse)  Composition: Solid  For solid nodules or for the solid portion of a partially cystic nodule,   does the solid portion have any of the following suspicious features?  -  No: Hypoechoic  -  No: Microcalcifications  -  No: Irregular margin (infiltrative or microlobulated)  -  No: Taller than wide (AP dimension > transverse)  -  No: Extrathyroidal extension  -  No: Interrupted rim calcification with soft tissue extrusion  ISTHMUS:  Dimensions: 0.3 cm AP  The right isthmus contains a benign colloid cyst 1.0 x 1.3 x 1.1 cm.  IMPRESSION:  Enlarged multinodular thyroid gland. The 3 thyroid nodules detailed above   meet criteria for fine-needle aspiration biopsy.            CAPILLARY BLOOD GLUCOSE & INSULIN RECEIVED  296 mg/dL (05-02 @ 21:42) - Lantus 24+2  298 mg/dL (05-03 @ 01:08)  194 mg/dL (05-03 @ 06:30)  203 mg/dL (05-03 @ 08:00) - Lispro 10  136 mg/dL (05-03 @ 12:03) - lispro 10  85 mg/dL (05-03 @ 16:25) - Pt declined  201 mg/dL (05-03 @ 21:33) - Lantus 24  193 mg/dL (05-04 @ 05:40)  179 mg/dL (05-04 @ 07:44) - Lispro 10  170 mg/dL (05-04 @ 12:13) - Lispro 10  80 mg/dL (05-04 @ 16:42) - Lispro 5  105 mg/dL (05-04 @ 21:32) - Lantus 12  95 mg/dL (05-05 @ 06:04)  100 mg/dL (05-05 @ 07:50) - Lispro 5  mg/dL (05-05 @ lunch)        REVIEW OF SYSTEMS  Constitutional:  Negative fever, chills or loss of appetite.  Eyes:  Negative blurry vision or double vision.  Cardiovascular:  Negative for chest pain or palpitations.  Respiratory:  Negative for cough, wheezing, or shortness of breath.    Gastrointestinal:  Negative for nausea, vomiting, diarrhea, constipation, or abdominal pain.  Genitourinary:  Negative frequency, urgency or dysuria.  Neurologic:  No headache, confusion, dizziness, lightheadedness.    PHYSICAL EXAM  Vital Signs Last 24 Hrs  T(C): 36.8 (05 May 2025 08:49), Max: 37 (04 May 2025 13:25)  T(F): 98.2 (05 May 2025 08:49), Max: 98.6 (04 May 2025 13:25)  HR: 87 (05 May 2025 10:46) (81 - 93)  BP: 103/68 (05 May 2025 10:46) (93/58 - 129/70)  BP(mean): 80 (05 May 2025 10:46) (71 - 91)  RR: 18 (05 May 2025 10:46) (18 - 18)  SpO2: 98% (05 May 2025 10:46) (92% - 100%)    Parameters below as of 05 May 2025 10:46  Patient On (Oxygen Delivery Method): nasal cannula  O2 Flow (L/min): 2      Constitutional: Awake, alert, in no acute distress.   HEENT: Normocephalic, atraumatic, ERIKA.  Respiratory: Lungs clear to ausculation bilaterally.   Cardiovascular: regular rhythm, normal S1 and S2, no audible murmurs.   GI: soft, non-tender, non-distended, bowel sounds present.  Extremities: No lower extremity edema.  Psychiatric: AAO x 3. Normal affect/mood.     LABS  CBC - WBC/HGB/HTC/PLT: 7.87/13.7/40.0/327 (05-05-25)  BMP - Na/K/Cl/Bicarb/BUN/Cr/Gluc/AG/eGFR: 140/4.0/103/26/24/1.24/95/11/65 (05-05-25)  Ca - 8.6 (05-05-25)  Phos - -- (05-05-25)  Mg - 1.9 (05-05-25)  LFT - Alb/Tprot/Tbili/Dbili/AlkPhos/ALT/AST: 3.3/--/0.5/--/114/27/15 (05-05-25)    Thyroid Stimulating Hormone, Serum: 0.371 (05-03-25)  Total T4/Free T4: --/1.460 (05-03-25)    MEDICATIONS  MEDICATIONS  (STANDING):  aspirin enteric coated 81 milliGRAM(s) Oral daily  atorvastatin 40 milliGRAM(s) Oral at bedtime  clopidogrel Tablet 75 milliGRAM(s) Oral daily  dextrose 5%. 1000 milliLiter(s) (50 mL/Hr) IV Continuous <Continuous>  dextrose 5%. 1000 milliLiter(s) (100 mL/Hr) IV Continuous <Continuous>  dextrose 50% Injectable 25 Gram(s) IV Push once  dextrose 50% Injectable 12.5 Gram(s) IV Push once  dextrose 50% Injectable 25 Gram(s) IV Push once  enoxaparin Injectable 40 milliGRAM(s) SubCutaneous every 24 hours  furosemide    Tablet 40 milliGRAM(s) Oral daily  glucagon  Injectable 1 milliGRAM(s) IntraMuscular once  insulin glargine Injectable (LANTUS) 24 Unit(s) SubCutaneous at bedtime  insulin lispro (ADMELOG) corrective regimen sliding scale   SubCutaneous at bedtime  insulin lispro Injectable (ADMELOG) 10 Unit(s) SubCutaneous three times a day before meals  metoprolol succinate ER 25 milliGRAM(s) Oral every 12 hours  sacubitril 97 mG/valsartan 103 mG 1 Tablet(s) Oral two times a day  spironolactone 25 milliGRAM(s) Oral daily    MEDICATIONS  (PRN):  acetaminophen     Tablet .. 650 milliGRAM(s) Oral every 6 hours PRN Mild Pain (1 - 3)  albuterol    90 MICROgram(s) HFA Inhaler 2 Puff(s) Inhalation every 6 hours PRN for shortness of breath and/or wheezing  dextrose Oral Gel 15 Gram(s) Oral once PRN Blood Glucose LESS THAN 70 milliGRAM(s)/deciliter  melatonin 3 milliGRAM(s) Oral at bedtime PRN Sleep   SUBJECTIVE / INTERVAL HPI: Patient was seen and examined this morning. Weekend consult Events of weekend reviewed. Pt feeling better. Breathing and edema have improved.    Endocrine is consulted for type 2 diabetes management (chronic, exacerbated).      "He is known to the service from previous admissions, last in July 2024. He was diagnosed with diabetes at 48 years of age and started on insulin at about age 57. He was discharged home last admission on Tresiba 28 units Novolog 12 units with meals, Farxiga 10 mg daily, and Mounjaro 2.5 mg weekly. He was most recently taking only Tresiba 30 units daily. He discontinued Farxiga and Mounjaro after his prescription from the hospital finished. He stopped Novolog approximately two months ago due to a lapsed in his prescription. He is mostly compliant with a diabetic diet, although he has apple juice he states he dilutes.  During this hospital admission he was started on Lantus 24 units at night, Lispro 10 units with meals, and Lispro moderate dose sliding scale. Glucose values  on this regimen.   Of note, he has a history of thyroid nodules without recent monitoring."    Reports not receiving medications because pharmacy said they would call doctor. He never called his doctor because they would insist he come in and pay the copay. He is monitoring very infrequently. He has been prescribed CGM in the past, but never used because he wasn't sure he wanted "something stuck in him."      INTERPRETATION:  THYROID ULTRASOUND with Doppler dated 2/1/2023 9:04 PM  Findings:  RIGHT LOBE:  Dimensions: 7.5 x 2.8 x 3.2 cm (sagittal x AP x transverse)  Echotexture: Homogenous  Vascularity: Normal  Multiple nodules seen, most suspicious discrete nodule as follows  Location: Right midpole, posterior  Dimensions: 1.7 x 0.9 x 1.3 cm (sagittal x AP x transverse)  Composition: Solid  For solid nodules or for the solid portion of a partially cystic nodule,   does the solid portion have any of the following suspicious features?  -  Yes: Hypoechoic  -  No: Microcalcifications  -  No: Irregular margin (infiltrative or microlobulated)  -  No: Taller than wide (AP dimension > transverse)  -  No: Extrathyroidal extension  -  No: Interrupted rim calcification with soft tissue extrusion  LEFT LOBE:  Dimensions: 8.2 x 3.7 x 4.0 cm (sagittal x AP x transverse)  Echotexture: Homogenous  Vascularity: Normal  Multiple nodules seen, most suspicious discrete nodules as follows:  Location: Left lower pole  Dimensions: 1.9 x 3.2 x 2.8 cm (sagittal x AP x transverse)  Composition: Solid  For solid nodules or for the solid portion of a partially cystic nodule,   does the solid portion have any of the following suspicious features?  -  No: Hypoechoic  -  No: Microcalcifications  -  No: Irregular margin (infiltrative or microlobulated)  -  Yes: Taller than wide (AP dimension > transverse)  -  No: Extrathyroidal extension  -  No: Interrupted rim calcification with soft tissue extrusion  Location: Left mid pole  Dimensions: 2.7 x 1.9 x 2.3 cm (sagittal x AP x transverse)  Composition: Solid  For solid nodules or for the solid portion of a partially cystic nodule,   does the solid portion have any of the following suspicious features?  -  No: Hypoechoic  -  No: Microcalcifications  -  No: Irregular margin (infiltrative or microlobulated)  -  No: Taller than wide (AP dimension > transverse)  -  No: Extrathyroidal extension  -  No: Interrupted rim calcification with soft tissue extrusion  ISTHMUS:  Dimensions: 0.3 cm AP  The right isthmus contains a benign colloid cyst 1.0 x 1.3 x 1.1 cm.  IMPRESSION:  Enlarged multinodular thyroid gland. The 3 thyroid nodules detailed above   meet criteria for fine-needle aspiration biopsy.    CAPILLARY BLOOD GLUCOSE & INSULIN RECEIVED  296 mg/dL (05-02 @ 21:42) - Lantus 24+2  298 mg/dL (05-03 @ 01:08)  194 mg/dL (05-03 @ 06:30)  203 mg/dL (05-03 @ 08:00) - Lispro 10  136 mg/dL (05-03 @ 12:03) - lispro 10  85 mg/dL (05-03 @ 16:25) - Pt declined  201 mg/dL (05-03 @ 21:33) - Lantus 24  193 mg/dL (05-04 @ 05:40)  179 mg/dL (05-04 @ 07:44) - Lispro 10  170 mg/dL (05-04 @ 12:13) - Lispro 10  80 mg/dL (05-04 @ 16:42) - Lispro 5. Ate chicken, potatoes and pudding.  105 mg/dL (05-04 @ 21:32) - Lantus 12  95 mg/dL (05-05 @ 06:04)  100 mg/dL (05-05 @ 07:50) - Lispro 5. Ate BB muffin and fruit yogurts, and eggs.  215 mg/dL (05-05 @ lunch) - Lispro 5+4. Was eating sandwich at time of visit.         REVIEW OF SYSTEMS  Constitutional:  Negative fever, chills or loss of appetite.  Eyes:  Negative blurry vision or double vision.  Cardiovascular:  Negative for chest pain or palpitations.  Respiratory:  Negative for cough, wheezing, or shortness of breath.    Gastrointestinal:  Negative for nausea, vomiting, diarrhea, constipation, or abdominal pain.  Genitourinary:  Negative frequency, urgency or dysuria.  Neurologic:  No headache, confusion, dizziness, lightheadedness.    PHYSICAL EXAM  Vital Signs Last 24 Hrs  T(C): 36.8 (05 May 2025 08:49), Max: 37 (04 May 2025 13:25)  T(F): 98.2 (05 May 2025 08:49), Max: 98.6 (04 May 2025 13:25)  HR: 87 (05 May 2025 10:46) (81 - 93)  BP: 103/68 (05 May 2025 10:46) (93/58 - 129/70)  BP(mean): 80 (05 May 2025 10:46) (71 - 91)  RR: 18 (05 May 2025 10:46) (18 - 18)  SpO2: 98% (05 May 2025 10:46) (92% - 100%)    Parameters below as of 05 May 2025 10:46  Patient On (Oxygen Delivery Method): nasal cannula  O2 Flow (L/min): 2      Constitutional: Awake, alert, in no acute distress.   HEENT: Normocephalic, atraumatic, ERIKA.  Respiratory: Lungs clear to ausculation bilaterally.   Cardiovascular: regular rhythm, normal S1 and S2, no audible murmurs.   GI: soft, non-tender, non-distended, bowel sounds present.  Extremities: b/l LES 1+ edema.  Psychiatric: AAO x 3. Normal affect/mood.     LABS  CBC - WBC/HGB/HTC/PLT: 7.87/13.7/40.0/327 (05-05-25)  BMP - Na/K/Cl/Bicarb/BUN/Cr/Gluc/AG/eGFR: 140/4.0/103/26/24/1.24/95/11/65 (05-05-25)  Ca - 8.6 (05-05-25)  Phos - -- (05-05-25)  Mg - 1.9 (05-05-25)  LFT - Alb/Tprot/Tbili/Dbili/AlkPhos/ALT/AST: 3.3/--/0.5/--/114/27/15 (05-05-25)    Thyroid Stimulating Hormone, Serum: 0.371 (05-03-25)  Total T4/Free T4: --/1.460 (05-03-25)    MEDICATIONS  MEDICATIONS  (STANDING):  aspirin enteric coated 81 milliGRAM(s) Oral daily  atorvastatin 40 milliGRAM(s) Oral at bedtime  clopidogrel Tablet 75 milliGRAM(s) Oral daily  dextrose 5%. 1000 milliLiter(s) (50 mL/Hr) IV Continuous <Continuous>  dextrose 5%. 1000 milliLiter(s) (100 mL/Hr) IV Continuous <Continuous>  dextrose 50% Injectable 25 Gram(s) IV Push once  dextrose 50% Injectable 12.5 Gram(s) IV Push once  dextrose 50% Injectable 25 Gram(s) IV Push once  enoxaparin Injectable 40 milliGRAM(s) SubCutaneous every 24 hours  furosemide    Tablet 40 milliGRAM(s) Oral daily  glucagon  Injectable 1 milliGRAM(s) IntraMuscular once  insulin glargine Injectable (LANTUS) 24 Unit(s) SubCutaneous at bedtime  insulin lispro (ADMELOG) corrective regimen sliding scale   SubCutaneous at bedtime  insulin lispro Injectable (ADMELOG) 10 Unit(s) SubCutaneous three times a day before meals  metoprolol succinate ER 25 milliGRAM(s) Oral every 12 hours  sacubitril 97 mG/valsartan 103 mG 1 Tablet(s) Oral two times a day  spironolactone 25 milliGRAM(s) Oral daily    MEDICATIONS  (PRN):  acetaminophen     Tablet .. 650 milliGRAM(s) Oral every 6 hours PRN Mild Pain (1 - 3)  albuterol    90 MICROgram(s) HFA Inhaler 2 Puff(s) Inhalation every 6 hours PRN for shortness of breath and/or wheezing  dextrose Oral Gel 15 Gram(s) Oral once PRN Blood Glucose LESS THAN 70 milliGRAM(s)/deciliter  melatonin 3 milliGRAM(s) Oral at bedtime PRN Sleep   SUBJECTIVE / INTERVAL HPI: Patient was seen and examined this morning. Weekend consult Events of weekend reviewed. Pt feeling better. Breathing and edema have improved.    Endocrine is consulted for type 2 diabetes management (chronic, exacerbated).      "He is known to the service from previous admissions, last in July 2024. He was diagnosed with diabetes at 48 years of age and started on insulin at about age 57. He was discharged home last admission on Tresiba 28 units Novolog 12 units with meals, Farxiga 10 mg daily, and Mounjaro 2.5 mg weekly. He was most recently taking only Tresiba 30 units daily. He discontinued Farxiga and Mounjaro after his prescription from the hospital finished. He stopped Novolog approximately two months ago due to a lapsed in his prescription. He is mostly compliant with a diabetic diet, although he has apple juice he states he dilutes.  During this hospital admission he was started on Lantus 24 units at night, Lispro 10 units with meals, and Lispro moderate dose sliding scale. Glucose values  on this regimen.   Of note, he has a history of thyroid nodules without recent monitoring."    Reports not receiving medications because pharmacy said they would call doctor. He never called his doctor because they would insist he come in and pay the copay. He is monitoring very infrequently. He has been prescribed CGM in the past, but never used because he wasn't sure he wanted "something stuck in him."    CAPILLARY BLOOD GLUCOSE & INSULIN RECEIVED  296 mg/dL (05-02 @ 21:42) - Lantus 24+2  298 mg/dL (05-03 @ 01:08)  194 mg/dL (05-03 @ 06:30)  203 mg/dL (05-03 @ 08:00) - Lispro 10  136 mg/dL (05-03 @ 12:03) - lispro 10  85 mg/dL (05-03 @ 16:25) - Pt declined  201 mg/dL (05-03 @ 21:33) - Lantus 24  193 mg/dL (05-04 @ 05:40)  179 mg/dL (05-04 @ 07:44) - Lispro 10  170 mg/dL (05-04 @ 12:13) - Lispro 10  80 mg/dL (05-04 @ 16:42) - Lispro 5. Ate chicken, potatoes and pudding.  105 mg/dL (05-04 @ 21:32) - Lantus 12  95 mg/dL (05-05 @ 06:04)  100 mg/dL (05-05 @ 07:50) - Lispro 5. Ate BB muffin and fruit yogurts, and eggs.  215 mg/dL (05-05 @ lunch) - Lispro 5+4. Was eating sandwich at time of visit.     REVIEW OF SYSTEMS  Constitutional:  Negative fever, chills or loss of appetite.  Eyes:  Negative blurry vision or double vision.  Cardiovascular:  Negative for chest pain or palpitations.  Respiratory:  Negative for cough, wheezing, or shortness of breath.    Gastrointestinal:  Negative for nausea, vomiting, diarrhea, constipation, or abdominal pain.  Genitourinary:  Negative frequency, urgency or dysuria.  Neurologic:  No headache, confusion, dizziness, lightheadedness.    PHYSICAL EXAM  Vital Signs Last 24 Hrs  T(C): 36.8 (05 May 2025 08:49), Max: 37 (04 May 2025 13:25)  T(F): 98.2 (05 May 2025 08:49), Max: 98.6 (04 May 2025 13:25)  HR: 87 (05 May 2025 10:46) (81 - 93)  BP: 103/68 (05 May 2025 10:46) (93/58 - 129/70)  BP(mean): 80 (05 May 2025 10:46) (71 - 91)  RR: 18 (05 May 2025 10:46) (18 - 18)  SpO2: 98% (05 May 2025 10:46) (92% - 100%)    Parameters below as of 05 May 2025 10:46  Patient On (Oxygen Delivery Method): nasal cannula  O2 Flow (L/min): 2      Constitutional: Awake, alert, in no acute distress.   HEENT: Normocephalic, atraumatic, ERIKA.  Respiratory: Lungs clear to ausculation bilaterally.   Cardiovascular: regular rhythm, normal S1 and S2, no audible murmurs.   GI: soft, non-tender, non-distended, bowel sounds present.  Extremities: b/l LES 1+ edema.  Psychiatric: AAO x 3. Normal affect/mood.     LABS  CBC - WBC/HGB/HTC/PLT: 7.87/13.7/40.0/327 (05-05-25)  BMP - Na/K/Cl/Bicarb/BUN/Cr/Gluc/AG/eGFR: 140/4.0/103/26/24/1.24/95/11/65 (05-05-25)  Ca - 8.6 (05-05-25)  Phos - -- (05-05-25)  Mg - 1.9 (05-05-25)  LFT - Alb/Tprot/Tbili/Dbili/AlkPhos/ALT/AST: 3.3/--/0.5/--/114/27/15 (05-05-25)    Thyroid Stimulating Hormone, Serum: 0.371 (05-03-25)  Total T4/Free T4: --/1.460 (05-03-25)    MEDICATIONS  MEDICATIONS  (STANDING):  aspirin enteric coated 81 milliGRAM(s) Oral daily  atorvastatin 40 milliGRAM(s) Oral at bedtime  clopidogrel Tablet 75 milliGRAM(s) Oral daily  dextrose 5%. 1000 milliLiter(s) (50 mL/Hr) IV Continuous <Continuous>  dextrose 5%. 1000 milliLiter(s) (100 mL/Hr) IV Continuous <Continuous>  dextrose 50% Injectable 25 Gram(s) IV Push once  dextrose 50% Injectable 12.5 Gram(s) IV Push once  dextrose 50% Injectable 25 Gram(s) IV Push once  enoxaparin Injectable 40 milliGRAM(s) SubCutaneous every 24 hours  furosemide    Tablet 40 milliGRAM(s) Oral daily  glucagon  Injectable 1 milliGRAM(s) IntraMuscular once  insulin glargine Injectable (LANTUS) 24 Unit(s) SubCutaneous at bedtime  insulin lispro (ADMELOG) corrective regimen sliding scale   SubCutaneous at bedtime  insulin lispro Injectable (ADMELOG) 10 Unit(s) SubCutaneous three times a day before meals  metoprolol succinate ER 25 milliGRAM(s) Oral every 12 hours  sacubitril 97 mG/valsartan 103 mG 1 Tablet(s) Oral two times a day  spironolactone 25 milliGRAM(s) Oral daily    MEDICATIONS  (PRN):  acetaminophen     Tablet .. 650 milliGRAM(s) Oral every 6 hours PRN Mild Pain (1 - 3)  albuterol    90 MICROgram(s) HFA Inhaler 2 Puff(s) Inhalation every 6 hours PRN for shortness of breath and/or wheezing  dextrose Oral Gel 15 Gram(s) Oral once PRN Blood Glucose LESS THAN 70 milliGRAM(s)/deciliter  melatonin 3 milliGRAM(s) Oral at bedtime PRN Sleep

## 2025-05-05 NOTE — PROGRESS NOTE ADULT - PROBLEM SELECTOR PLAN 2
INTERPRETATION:  THYROID ULTRASOUND with Doppler dated 2/1/2023 9:04 PM  IMPRESSION:  Enlarged multinodular thyroid gland. The 3 thyroid nodules detailed above   meet criteria for fine-needle aspiration biopsy.  Thyroid Stimulating Hormone, Serum: 0.371 (05-03-25)  Total T4/Free T4: --/1.460 (05-03-25)    Outpatient monitoring needed INTERPRETATION:  THYROID ULTRASOUND with Doppler dated 2/1/2023 9:04 PM  IMPRESSION:  Enlarged multinodular thyroid gland. The 3 thyroid nodules detailed above   meet criteria for fine-needle aspiration biopsy.  Patient reports having b/l FNA several years ago that was benign.  Thyroid Stimulating Hormone, Serum: 0.371 (05-03-25)  Total T4/Free T4: --/1.460 (05-03-25)    Outpatient monitoring needed

## 2025-05-05 NOTE — PROGRESS NOTE ADULT - PROBLEM SELECTOR PLAN 1
Dry wt ~82 kg, dry proBNP ~4k (currently 2k)   - TTE (5/3/25): EF 23%, increased LV mass and eccentric hypertrophy, no regional wall motion abnormalities, borderline reduced RV function, Left atrium severely dilated, right atrium dilated, mild MR, PASP 34 mm Hg. EF 30% by echo 1/2025.  - Follow up CXR 5/5   - Diuresis: Lasix 40 mg IVPx1, transition to PO in AM   - GDMT: Entresto 97/103 mg BID, Metoprolol Succinate 25 mg BID, Tanner 25 mg QD, pt had stopped farxiga 2/2 "skin changes"   - Core measures, strict I&O, daily weights

## 2025-05-05 NOTE — DISCHARGE NOTE PROVIDER - CARE PROVIDER_API CALL
Nico Astorga  Endocrinology/Metab/Diabetes  [address], [address]  [place] [address]  Phone: [phone]  Fax: [phone]  Follow Up Time: 1 week    Bashir Montoya  Adv Heart Fail Trnsplnt Cardio  [address] [place] [address]  Phone: [phone]  Fax: [phone]  Established Patient  Scheduled Appointment: 05/06/2025 02:00 PM   Nico Astorga  Endocrinology/Metab/Diabetes  [address], [address]  [place] [address]  Phone: [phone]  Fax: [phone]  Follow Up Time: 1 week    Bashir Montoya  Adv Heart Fail Trnsplnt Cardio  [address] [place] [address]  Phone: [phone]  Fax: [phone]  Established Patient  Scheduled Appointment: 05/21/2025 01:00 PM

## 2025-05-05 NOTE — DISCHARGE NOTE PROVIDER - NSDCFUSCHEDAPPT_GEN_ALL_CORE_FT
Bashir Montoya  [facility]  [facility] [address] [facility]  Scheduled Appointment: 05/06/2025     [facility] [address]  Scheduled Appointment: 05/21/2025    Bashir Montoya  [facility] [address]  Scheduled Appointment: 06/24/2025

## 2025-05-06 ENCOUNTER — APPOINTMENT (OUTPATIENT)
Dept: HEART FAILURE | Facility: CLINIC | Age: 65
End: 2025-05-06

## 2025-05-06 LAB
ALBUMIN SERPL ELPH-MCNC: 3.6 G/DL — SIGNIFICANT CHANGE UP (ref 3.3–5)
ALP SERPL-CCNC: 109 U/L — SIGNIFICANT CHANGE UP (ref 40–120)
ALT FLD-CCNC: 22 U/L — SIGNIFICANT CHANGE UP (ref 10–45)
ANION GAP SERPL CALC-SCNC: 12 MMOL/L — SIGNIFICANT CHANGE UP (ref 5–17)
AST SERPL-CCNC: 14 U/L — SIGNIFICANT CHANGE UP (ref 10–40)
BILIRUB SERPL-MCNC: 0.6 MG/DL — SIGNIFICANT CHANGE UP (ref 0.2–1.2)
BUN SERPL-MCNC: 22 MG/DL — SIGNIFICANT CHANGE UP (ref 7–23)
CALCIUM SERPL-MCNC: 8.9 MG/DL — SIGNIFICANT CHANGE UP (ref 8.4–10.5)
CHLORIDE SERPL-SCNC: 101 MMOL/L — SIGNIFICANT CHANGE UP (ref 96–108)
CO2 SERPL-SCNC: 26 MMOL/L — SIGNIFICANT CHANGE UP (ref 22–31)
CREAT SERPL-MCNC: 1.23 MG/DL — SIGNIFICANT CHANGE UP (ref 0.5–1.3)
EGFR: 66 ML/MIN/1.73M2 — SIGNIFICANT CHANGE UP
EGFR: 66 ML/MIN/1.73M2 — SIGNIFICANT CHANGE UP
GLUCOSE SERPL-MCNC: 92 MG/DL — SIGNIFICANT CHANGE UP (ref 70–99)
HCT VFR BLD CALC: 41.7 % — SIGNIFICANT CHANGE UP (ref 39–50)
HGB BLD-MCNC: 14.1 G/DL — SIGNIFICANT CHANGE UP (ref 13–17)
MAGNESIUM SERPL-MCNC: 2 MG/DL — SIGNIFICANT CHANGE UP (ref 1.6–2.6)
MCHC RBC-ENTMCNC: 31.2 PG — SIGNIFICANT CHANGE UP (ref 27–34)
MCHC RBC-ENTMCNC: 33.8 G/DL — SIGNIFICANT CHANGE UP (ref 32–36)
MCV RBC AUTO: 92.3 FL — SIGNIFICANT CHANGE UP (ref 80–100)
NRBC BLD AUTO-RTO: 0 /100 WBCS — SIGNIFICANT CHANGE UP (ref 0–0)
PLATELET # BLD AUTO: 343 K/UL — SIGNIFICANT CHANGE UP (ref 150–400)
POTASSIUM SERPL-MCNC: 4.2 MMOL/L — SIGNIFICANT CHANGE UP (ref 3.5–5.3)
POTASSIUM SERPL-SCNC: 4.2 MMOL/L — SIGNIFICANT CHANGE UP (ref 3.5–5.3)
PROT SERPL-MCNC: 6.2 G/DL — SIGNIFICANT CHANGE UP (ref 6–8.3)
RBC # BLD: 4.52 M/UL — SIGNIFICANT CHANGE UP (ref 4.2–5.8)
RBC # FLD: 13 % — SIGNIFICANT CHANGE UP (ref 10.3–14.5)
SODIUM SERPL-SCNC: 139 MMOL/L — SIGNIFICANT CHANGE UP (ref 135–145)
WBC # BLD: 8.29 K/UL — SIGNIFICANT CHANGE UP (ref 3.8–10.5)
WBC # FLD AUTO: 8.29 K/UL — SIGNIFICANT CHANGE UP (ref 3.8–10.5)

## 2025-05-06 PROCEDURE — 99232 SBSQ HOSP IP/OBS MODERATE 35: CPT | Mod: GC

## 2025-05-06 PROCEDURE — 99233 SBSQ HOSP IP/OBS HIGH 50: CPT

## 2025-05-06 PROCEDURE — 99222 1ST HOSP IP/OBS MODERATE 55: CPT

## 2025-05-06 PROCEDURE — 0576T INTERROG DEV EVAL ICDS SS IP: CPT

## 2025-05-06 RX ORDER — INSULIN GLARGINE-YFGN 100 [IU]/ML
12 INJECTION, SOLUTION SUBCUTANEOUS AT BEDTIME
Refills: 0 | Status: DISCONTINUED | OUTPATIENT
Start: 2025-05-06 | End: 2025-05-07

## 2025-05-06 RX ORDER — FUROSEMIDE 10 MG/ML
40 INJECTION INTRAMUSCULAR; INTRAVENOUS ONCE
Refills: 0 | Status: COMPLETED | OUTPATIENT
Start: 2025-05-06 | End: 2025-05-06

## 2025-05-06 RX ORDER — FUROSEMIDE 10 MG/ML
40 INJECTION INTRAMUSCULAR; INTRAVENOUS EVERY 24 HOURS
Refills: 0 | Status: DISCONTINUED | OUTPATIENT
Start: 2025-05-06 | End: 2025-05-06

## 2025-05-06 RX ORDER — METOPROLOL SUCCINATE 50 MG/1
50 TABLET, EXTENDED RELEASE ORAL DAILY
Refills: 0 | Status: DISCONTINUED | OUTPATIENT
Start: 2025-05-07 | End: 2025-05-07

## 2025-05-06 RX ORDER — METOPROLOL SUCCINATE 50 MG/1
25 TABLET, EXTENDED RELEASE ORAL ONCE
Refills: 0 | Status: DISCONTINUED | OUTPATIENT
Start: 2025-05-06 | End: 2025-05-06

## 2025-05-06 RX ORDER — METOPROLOL SUCCINATE 50 MG/1
25 TABLET, EXTENDED RELEASE ORAL DAILY
Refills: 0 | Status: DISCONTINUED | OUTPATIENT
Start: 2025-05-06 | End: 2025-05-06

## 2025-05-06 RX ORDER — FUROSEMIDE 10 MG/ML
40 INJECTION INTRAMUSCULAR; INTRAVENOUS EVERY 24 HOURS
Refills: 0 | Status: DISCONTINUED | OUTPATIENT
Start: 2025-05-07 | End: 2025-05-07

## 2025-05-06 RX ADMIN — INSULIN LISPRO 5 UNIT(S): 100 INJECTION, SOLUTION INTRAVENOUS; SUBCUTANEOUS at 12:07

## 2025-05-06 RX ADMIN — Medication 25 MILLIGRAM(S): at 05:14

## 2025-05-06 RX ADMIN — METOPROLOL SUCCINATE 25 MILLIGRAM(S): 50 TABLET, EXTENDED RELEASE ORAL at 05:14

## 2025-05-06 RX ADMIN — FUROSEMIDE 40 MILLIGRAM(S): 10 INJECTION INTRAMUSCULAR; INTRAVENOUS at 12:07

## 2025-05-06 RX ADMIN — Medication 3 MILLIGRAM(S): at 22:23

## 2025-05-06 RX ADMIN — INSULIN LISPRO 2: 100 INJECTION, SOLUTION INTRAVENOUS; SUBCUTANEOUS at 17:10

## 2025-05-06 RX ADMIN — ATORVASTATIN CALCIUM 40 MILLIGRAM(S): 80 TABLET, FILM COATED ORAL at 22:23

## 2025-05-06 RX ADMIN — ENOXAPARIN SODIUM 40 MILLIGRAM(S): 100 INJECTION SUBCUTANEOUS at 17:11

## 2025-05-06 RX ADMIN — METOPROLOL SUCCINATE 25 MILLIGRAM(S): 50 TABLET, EXTENDED RELEASE ORAL at 12:07

## 2025-05-06 RX ADMIN — FUROSEMIDE 40 MILLIGRAM(S): 10 INJECTION INTRAMUSCULAR; INTRAVENOUS at 17:11

## 2025-05-06 RX ADMIN — Medication 81 MILLIGRAM(S): at 12:05

## 2025-05-06 RX ADMIN — INSULIN LISPRO 2: 100 INJECTION, SOLUTION INTRAVENOUS; SUBCUTANEOUS at 22:20

## 2025-05-06 RX ADMIN — INSULIN LISPRO 2: 100 INJECTION, SOLUTION INTRAVENOUS; SUBCUTANEOUS at 12:06

## 2025-05-06 RX ADMIN — CLOPIDOGREL BISULFATE 75 MILLIGRAM(S): 75 TABLET, FILM COATED ORAL at 12:05

## 2025-05-06 RX ADMIN — INSULIN GLARGINE-YFGN 12 UNIT(S): 100 INJECTION, SOLUTION SUBCUTANEOUS at 22:20

## 2025-05-06 RX ADMIN — INSULIN LISPRO 5 UNIT(S): 100 INJECTION, SOLUTION INTRAVENOUS; SUBCUTANEOUS at 08:37

## 2025-05-06 RX ADMIN — SACUBITRIL AND VALSARTAN 1 TABLET(S): 6; 6 PELLET ORAL at 05:14

## 2025-05-06 RX ADMIN — INSULIN LISPRO 5 UNIT(S): 100 INJECTION, SOLUTION INTRAVENOUS; SUBCUTANEOUS at 17:10

## 2025-05-06 RX ADMIN — SACUBITRIL AND VALSARTAN 1 TABLET(S): 6; 6 PELLET ORAL at 17:11

## 2025-05-06 NOTE — PROGRESS NOTE ADULT - SUBJECTIVE AND OBJECTIVE BOX
EPS Device interrogation    Indication: HF     Device model: [employer] Hydroronik Ilivia      Implanting Physician: Dr. Powers    Functioning Mode: VVI 40			    Underlying Rhythm: VS    Pacemaker dependency:  No    Battery status: 60%, 3.10 V   Interrogating parameters:   				RA			RV			LV    Sense:                                                                           24.0   mV    Threshold:                                                                  1.3  V@0.4 ms    Pacing Impedance:                                                              538 om    Shock Impedance:                                                                   68 om    Events/Alert:  none    Parameter change: 	none    For ICD only:  tachy therapy – unchanged   Normal function ICD    [ ]EPS attending: Interrogation reviewed. Agree with above.

## 2025-05-06 NOTE — CONSULT NOTE ADULT - NS ATTEND AMEND GEN_ALL_CORE FT
64M PMHx Stage C idiopathic DCM (EF 23, EMERY 76, RV mild reduced, PASP 34) p/w acute on chronic decompensated systolic HF. Reports progressive LE edema over several days without clear trigger; had not called office or started taking diuretics. Since admission, significant improvement in edema per patient, but still with evidence of some volume overload. No cardiogenic shock, tolerating home neurohormonal therapy regimen.    Recommendations:  - BB: increase Toprol to 50  - RAASi: Ent   - MRA: Ald 25  - SLGT2i: defer given prior perineal rash and poorly controlled DM  - Diuretics: Lasix 40 PO BID today; will determine discharge regimen depending on response  - Devices: s/p single chamber ICD  - suggest endocrine follow-up  - will arrange for HF follow-up with Dr. Montoya

## 2025-05-06 NOTE — CONSULT NOTE ADULT - PROBLEM SELECTOR RECOMMENDATION 4
- LDL this admission 75  - [facility]: 2/2/23: non obstructive CAD  - On atorvastatin 40 mg qHS

## 2025-05-06 NOTE — PROGRESS NOTE ADULT - PROBLEM SELECTOR PLAN 2
Baseline Cr 1.3-1.5 (2023); Cr 1.38 on admission; currently 1.23  - Avoid nephrotoxins, renally dose meds, strict I&O, daily phosphorus - Baseline Cr 1.3-1.5 (2023); Cr 1.38 on admission; currently 1.23  - Avoid nephrotoxins, renally dose meds, strict I&O, daily phosphorus

## 2025-05-06 NOTE — PROGRESS NOTE ADULT - PROBLEM SELECTOR PLAN 1
Dry wt ~82 kg, dry proBNP ~4k (currently 2k)   - TTE (5/3/25): EF 23%, increased LV mass and eccentric hypertrophy, no regional wall motion abnormalities, borderline reduced RV function, Left atrium severely dilated, right atrium dilated, mild MR, PASP 34 mm Hg. EF 30% by echo 1/2025.  - CXR (5/5/25): Heart size, mediastinal and hilar contours are unchanged. Persistent cardiomegaly with left ventricular prominence. There is a left-sided single lead pacemaker with the right ventricular ICD the tip projecting over the region of the right ventricle unchanged from prior imaging. Visualized lung zones are clear. No acute soft tissue or osseous pathology.   - Diuresis: transitioned to Lasix PO 40mg BID today. Tomorrow likely Lasix 40mg PO QD per HF recs.   - GDMT: Entresto 97/103 mg BID, Metoprolol Succinate 50mg QD, [place] 25 mg QD, pt had stopped farxiga 2/2 "skin changes"   - Core measures, strict I&O, daily weights Dry wt ~82 kg, dry proBNP ~4k (currently 2k)   - TTE (5/3/25): EF 23%, increased LV mass and eccentric hypertrophy, no regional wall motion abnormalities, borderline reduced RV function, Left atrium severely dilated, right atrium dilated, mild MR, PASP 34 mm Hg. EF 30% by echo 1/2025.  - CXR (5/5/25): Heart size, mediastinal and hilar contours are unchanged. Persistent cardiomegaly with left ventricular prominence. There is a left-sided single lead pacemaker with the right ventricular ICD the tip projecting over the region of the right ventricle unchanged from prior imaging. Visualized lung zones are clear. No acute soft tissue or osseous pathology.   - Diuresis: transitioned to Lasix PO 40mg BID today. Tomorrow likely Lasix 40mg PO QD per HF recs.   - GDMT: Entresto 97/103 mg BID, Metoprolol Succinate 50mg QD, [place] 25 mg QD, pt had stopped farxiga 2/2 "skin changes"   -Device interrogated by EP today    - Core measures, strict I&O, daily weights - Near euvolemic on exam, SpO2 100% on RA, BNP 2k  - TTE (5/3/25): EF 23%, increased LV mass and eccentric hypertrophy, no regional wall motion abnormalities, borderline reduced RV function, Left atrium severely dilated, right atrium dilated, mild MR, PASP 34 mm Hg. EF 30% by echo 1/2025.  - CXR (5/5/25): Persistent cardiomegaly with LV prominence. Left-sided single lead pacemaker with the right ventricular ICD the tip projecting over the region of the right ventricle unchanged from prior imaging. Unremarkable  - Diuresis: Last dose of Lasix 40 mg IVP on 5/5. Started Lasix 40mg PO BID today, likely transition to Lasix 40mg PO QD per HF recs  - GDMT: Entresto 97/103 mg PO BID, Metoprolol Succinate 50mg PO QD, [place] 25 mg PO QD, pt had stopped farxiga 2/2 "skin changes"   - Device: single- chamber ICD, interrogated by EP today w/ normal function (5/6/25)  - F/u w/ Dr. Pisano on discharge  - Core measures, strict I&O, daily weights - Near euvolemic on exam, SpO2 100% on RA, BNP 9k --> 2k 5/5/25  - TTE (5/3/25): EF 23%, increased LV mass and eccentric hypertrophy, no regional wall motion abnormalities, borderline reduced RV function, Left atrium severely dilated, right atrium dilated, mild MR, PASP 34 mm Hg. EF 30% by echo 1/2025.  - CXR (5/5/25): Persistent cardiomegaly with LV prominence. Left-sided single lead pacemaker with the right ventricular ICD the tip projecting over the region of the right ventricle unchanged from prior imaging. Unremarkable  - Diuresis: Last dose of Lasix 40 mg IVP on 5/5. Started Lasix 40mg PO BID today, likely transition to Lasix 40mg PO QD per HF recs  - GDMT: Entresto 97/103 mg PO BID, Metoprolol Succinate 50mg PO QD, [place] 25 mg PO QD, pt had stopped farxiga 2/2 "skin changes"   - Device: single-chamber ICD, interrogated by EP today w/ normal function (5/6/25)  - F/u w/ Dr. Pisano on discharge  - Core measures, strict I&O, daily weights

## 2025-05-06 NOTE — CONSULT NOTE ADULT - ASSESSMENT
Mr. Greenfield is a 64 year old man with ACC/AHA Stage C (LV 7.6 cm, LVEF 23%) s/p single chamber ICD (2006), HTN, CKD (baseline Cr 1.3-1.5), poorly controlled DMT2 (Ha1c 12%), COPD and active smoker.    Who presented with ADHF. He was last seen by HF clinic as an outpatient in December 2024 and on excellent doses of GDMT with instructions to administer furosemide 40 mg as needed. On admission patient reports he has no diuretics at home but reports adherence with the remainder of his medications. Since admission he has been improving with intermittent IV Lasix, pro-BNP now 2800 from 65053 on admission.     He denies any chest pain, shortness of breath, orthopnea/PND. Reports improving LE edema. Denies any dizziness of LH.     From a HF perspective he is less symptomatic tolerating excellent doses of GDMT but mildly volume overloaded. I have recommended the following:      Cardiac Studies:    TTE 5/3/25: LVIDd 7.6 cm, LVEF 23%, increased LV mass and eccentric hypertrophy (setpum 0.7, PWT 1.1), normal RV size with borderline reduced function (TAPSE 1.6), severe LAE, dilated RA, mild MR, trace TR, est PASP 34 mmHg, IVC normal size   TTE 1/17/25: LVIDd 5.8 cm, LVEF 30% (global), mild LVH (septum 1.2, PWT 1.3), mild DD, normal RV size/function (TAPSE 2.3), normal biatrial size, mild MR, trace TR, est PASP 20 mmHg    [facility] 2/2/23: BP 94/69/78, , RA 10, PA 24/27/17, PCWP 11, AO 96%, Pa 61%,  CO/CI (F) 3.75/1.8, SVR 1490 dsc, PVR 1.60 carver  [facility] 2/2/23: LM no disease, LAD minor irregularities, D1 minor irregularities, Cx minor irregularities, OM1 minor irregularities, RCA minor irregularities      Home medications: Lasix 40 PRN, Entresto  mg BID, Toprol XL 50 mg BID, Farxiga 10 mg QD, ASA, Atorva 40, Tresidba, Novolog

## 2025-05-06 NOTE — PROGRESS NOTE ADULT - PROBLEM SELECTOR PLAN 6
DVT ppx: Lovenox   F: none currently   E: Keep K > 4, Mg > 2  N: DASH/TLC     Code: Full  Dispo: pending clinical progression     Case discussed with Dr. Gibbons. DVT ppx: Lovenox   F: none currently   E: Keep K > 4, Mg > 2  N: DASH/TLC   Code: Full  Dispo: pending clinical progression     Case discussed with Dr. Gibbons. DVT ppx: Lovenox   F: none currently   E: Keep K > 4, Mg > 2  N: DASH/TLC   Code: Full  Dispo: pending clinical progression     Case discussed with Dr. Gibbons.    I have personally reviewed the above clinical note and all of its components, I agree with the above with modifications made to the assessment and/or plan of care.

## 2025-05-06 NOTE — CONSULT NOTE ADULT - PROBLEM SELECTOR RECOMMENDATION 2
# DMT2  - Poorly controlled with Ha1c of 12% this admission  - Management per endocrinology  - Please arrange for outpatient endocrinology follow up

## 2025-05-06 NOTE — PROGRESS NOTE ADULT - PROBLEM SELECTOR PLAN 5
Lipid panel 5/4: , HDL 27, LDL 75, TG 73  - Continue: Atorvastatin 40 mg qhs - Lipid panel 5/4: , HDL 27, LDL 75, TG 73  - Continue: Atorvastatin 40 mg qhs

## 2025-05-06 NOTE — PROGRESS NOTE ADULT - PROBLEM SELECTOR PLAN 3
- A1c 12%  - Continue: Admelog 5 units SC TID; Glargine 24 units w/ 20% reduction (TIC w/ Tresiba)   - Consult endo, f/u recs; DM educator; outpatient follow up    ##Thyroid Nodule  TSH 0.371; Tota T4/FT4 1.460  - Thyroid US (2/1/23) enlarged multinodular thyroid gland; meet criteria for FNA  - Endo consulted, recommends outpatient monitoring - A1c 12%  - Continue: Admelog 5 units SC TID; decrease to insulin glargine 12U qhs  - Consult endo, f/u recs; DM educator; outpatient follow up    ##Thyroid Nodule  TSH 0.371; Tota T4/FT4 1.460  - Thyroid US (2/1/23) enlarged multinodular thyroid gland; meet criteria for FNA  - Endo consulted, recommends outpatient monitoring - A1c 12%  - Continue: Admelog 5 units SC TID; decrease to insulin glargine 12U qhs  - Endo following, f/u dispo recs; DM educator; outpatient follow up    ##Thyroid Nodule  - TSH 0.371; Tota T4/FT4 1.460  - Thyroid US (2/1/23) enlarged multinodular thyroid gland; meet criteria for FNA  - Endo consulted, recommends outpatient monitoring

## 2025-05-06 NOTE — CONSULT NOTE ADULT - SUBJECTIVE AND OBJECTIVE BOX
History of Present Illness: Mr. Greenfield is a 64 year-old man with history of coronary artery disease, current smoker, chronic obstructive pulmonary disease, chronic systolic heart failure, type 2 diabetes mellitus (HbA1c 12.0% during hospital admission) admitted with acute on chronic systolic heart failure. We were consulted for assistance in glycemic management.    He is known to the service from previous admissions, last in July 2024. He was diagnosed with diabetes at 48 years of age and started on insulin at about age 57. He was discharged home last admission on Tresiba 28 units Novolog 12 units with meals, Farxiga 10 mg daily, and Mounjaro 2.5 mg weekly. He was most recently taking only Tresiba 30 units daily. He discontinued Farxiga and Mounjaro after his prescription from the hospital finished. He stopped Novolog approximately two months ago due to a lapsed in his prescription. He is mostly compliant with a diabetic diet, although he has apple juice he states he dilutes.    During this hospital admission he was started on Lantus 24 units at night, Lispro 10 units with meals, and Lispro moderate dose sliding scale. Glucose values  on this regimen.     Of note, he has a history of thyroid nodules without recent monitoring.     PMH & Surgical Hx:SYSTOLIC CONGESTIVEHEART FAILURE    Handoff    MEWS Score    No pertinent past medical history    HTN (hypertension)    DM (diabetes mellitus)    CAD (coronary artery disease)    Acute systolic congestive heart failure    HFrEF (heart failure with reduced ejection fraction)    History of tobacco use disorder    Chronic obstructive pulmonary disease (COPD)    Acute on chronic systolic congestive heart failure    Acute on chronic systolic congestive heart failure    DM2 (diabetes mellitus, type 2)    COPD without exacerbation    Dyslipidemia    Prophylactic measure    CKD (chronic kidney disease) stage 3, GFR 30-59 ml/min    S/P cardiac cath    S/P ICD (internal cardiac defibrillator) procedure    MED EVAL    90+    [id number]        FH: Non-contributory.     SH: + smoking.     Current Meds:  acetaminophen     Tablet .. 650 milliGRAM(s) Oral every 6 hours PRN  albuterol    90 MICROgram(s) HFA Inhaler 2 Puff(s) Inhalation every 6 hours PRN  aspirin enteric coated 81 milliGRAM(s) Oral daily  atorvastatin 40 milliGRAM(s) Oral at bedtime  clopidogrel Tablet 75 milliGRAM(s) Oral daily  dextrose 5%. 1000 milliLiter(s) IV Continuous <Continuous>  dextrose 5%. 1000 milliLiter(s) IV Continuous <Continuous>  dextrose 50% Injectable 25 Gram(s) IV Push once  dextrose 50% Injectable 12.5 Gram(s) IV Push once  dextrose 50% Injectable 25 Gram(s) IV Push once  dextrose Oral Gel 15 Gram(s) Oral once PRN  enoxaparin Injectable 40 milliGRAM(s) SubCutaneous every 24 hours  furosemide    Tablet 40 milliGRAM(s) Oral daily  glucagon  Injectable 1 milliGRAM(s) IntraMuscular once  insulin glargine Injectable (LANTUS) 24 Unit(s) SubCutaneous at bedtime  insulin lispro (ADMELOG) corrective regimen sliding scale   SubCutaneous at bedtime  insulin lispro Injectable (ADMELOG) 10 Unit(s) SubCutaneous three times a day before meals  melatonin 3 milliGRAM(s) Oral at bedtime PRN  metoprolol succinate ER 25 milliGRAM(s) Oral every 12 hours  sacubitril 97 mG/valsartan 103 mG 1 Tablet(s) Oral two times a day      Allergies:  No Known Allergies      ROS:  Denies the following except as indicated.    General: weight loss/weight gain, decreased appetite, fatigue  Eyes: Blurry vision, double vision, visual changes  ENT: Throat pain, changes in voice,   CV: palpitations, SOB, CP, cough  GI: NVD, difficulty swallowing, abdominal pain  : polyuria, dysuria  Endo: abnormal menses, temperature intolerance, decreased libido  MSK: weakness, joint pain  Skin: rash, dryness, diaphoresis  Heme: Easy bruising,bleeding  Neuro: HA, dizziness, lightheadedness, numbness tingling  Psych: Anxiety, Depression    Vital Signs Last 24 Hrs  T(C): 37 (04 May 2025 13:25), Max: 37 (04 May 2025 13:25)  T(F): 98.6 (04 May 2025 13:25), Max: 98.6 (04 May 2025 13:25)  HR: 87 (04 May 2025 13:25) (80 - 90)  BP: 102/62 (04 May 2025 13:25) (90/54 - 114/67)  BP(mean): 78 (04 May 2025 13:25) (71 - 86)  RR: 18 (04 May 2025 13:25) (18 - 18)  SpO2: 99% (04 May 2025 13:25) (95% - 99%)    Parameters below as of 04 May 2025 13:25  Patient On (Oxygen Delivery Method): nasal cannula  O2 Flow (L/min): 2    Height (cm): 170.2 (05-02 @ 20:39)  Weight (kg): 90.7 (05-02 @ 20:39)  BMI (kg/m2): 31.3 (05-02 @ 20:39)      Constitutional: wn/wd in NAD.   HEENT: NCAT, MMM, OP clear, EOMI, , no proptosis or lid retraction  Neck: no thyromegaly or palpable thyroid nodules   Respiratory: lungs CTAB.  Cardiovascular: regular rhythm, normal S1 and S2, no audible murmurs, no peripheral edema  GI: soft, NT/ND, no masses/HSM appreciated.  Neurology: no tremors, DTR 2+  Skin: no visible rashes/lesions  Psychiatric: AAO x 3, normal affect/mood.  Ext: radial pulses intact, DP pulses intact, extremities warm, no cyanosis, clubbing or edema.       LABS:                        13.6   8.52  )-----------( 331      ( 04 May 2025 05:40 )             40.1     05-04    139  |  102  |  23  ----------------------------<  193[H]  4.1   |  27  |  1.44[H]    Ca    8.6      04 May 2025 05:40  Phos  4.2     05-04  Mg     1.8     05-04    TPro  5.5[L]  /  Alb  3.3  /  TBili  0.4  /  DBili  x   /  AST  18  /  ALT  35  /  AlkPhos  123[H]  05-04      Urinalysis Basic - ( 04 May 2025 05:40 )    Color: x / Appearance: x / SG: x / pH: x  Gluc: 193 mg/dL / Ketone: x  / Bili: x / Urobili: x   Blood: x / Protein: x / Nitrite: x   Leuk Esterase: x / RBC: x / WBC x   Sq Epi: x / Non Sq Epi: x / Bacteria: x        Thyroid Stimulating Hormone, Serum: 0.371 (05-03 @ 06:30)  Thyroid Stimulating Hormone, Serum: 0.359 (06-30 @ 07:04)      RADIOLOGY & ADDITIONAL STUDIES:  CAPILLARY BLOOD GLUCOSE      POCT Blood Glucose.: 170 mg/dL (04 May 2025 12:13)  POCT Blood Glucose.: 179 mg/dL (04 May 2025 07:44)  POCT Blood Glucose.: 201 mg/dL (03 May 2025 21:33)  POCT Blood Glucose.: 85 mg/dL (03 May 2025 16:25)        Impression/Recommendations: Mr. Greenfield is a 64 year-old man with history of coronary artery disease, current smoker, chronic obstructive pulmonary disease, chronic systolic heart failure, type 2 diabetes mellitus (HbA1c 12.0% during hospital admission) admitted with acute on chronic systolic heart failure. We were consulted for assistance in glycemic management.    1.  Type 2 diabetes mellitus, uncontrolled, complicated.   Please continue Lantus 24 units at night  Please continue Lispro 10 units before each meal  Please continue Lispro moderate dose sliding scale four times daily with meals and at bedtime    2. Thyroid nodule. He has been biochemically euthyroid, although his TSH was at the lower end of normal.   Outpatient monitoring needed    Thank you for this consult. We will continue to monitor. He prefers to follow with a [facility] diabetologist in [place] on discharge. 
HPI:  Mr. Greenfield is a 64 year old man with ACC/AHA Stage C (LV 7.6 cm, LVEF 23%) s/p single chamber ICD (2006), HTN, CKD (baseline Cr 1.3-1.5), poorly controlled DMT2 (Ha1c 12%), COPD and active smoker.    Who presented with ADHF. He was last seen by HF clinic as an outpatient in December 2024 and on excellent doses of GDMT with instructions to administer furosemide 40 mg as needed. On admission patient reports he has no diuretics at home but reports adherence with the remainder of his medications. Since admission he has been improving with intermittent IV Lasix, pro-BNP now 2800 from 25615 on admission.     He denies any chest pain, shortness of breath, orthopnea/PND. Reports improving LE edema. Denies any dizziness of LH.       PAST MEDICAL & SURGICAL HISTORY:  HTN (hypertension)  DM (diabetes mellitus)  CAD (coronary artery disease)  HFrEF (heart failure with reduced ejection fraction)  History of tobacco use disorder  Chronic obstructive pulmonary disease (COPD)  S/P cardiac cath  S/P ICD (internal cardiac defibrillator) procedure          REVIEW OF SYSTEMS  The remaining 14-point ROS is otherwise negative or non contributory except as noted in the HPI       MEDICATIONS  (STANDING):  aspirin enteric coated 81 milliGRAM(s) Oral daily  atorvastatin 40 milliGRAM(s) Oral at bedtime  clopidogrel Tablet 75 milliGRAM(s) Oral daily  dextrose 5%. 1000 milliLiter(s) (50 mL/Hr) IV Continuous <Continuous>  dextrose 5%. 1000 milliLiter(s) (100 mL/Hr) IV Continuous <Continuous>  dextrose 50% Injectable 25 Gram(s) IV Push once  dextrose 50% Injectable 12.5 Gram(s) IV Push once  dextrose 50% Injectable 25 Gram(s) IV Push once  enoxaparin Injectable 40 milliGRAM(s) SubCutaneous every 24 hours  glucagon  Injectable 1 milliGRAM(s) IntraMuscular once  insulin glargine Injectable (LANTUS) 12 Unit(s) SubCutaneous at bedtime  insulin lispro (ADMELOG) corrective regimen sliding scale   SubCutaneous Before meals and at bedtime  insulin lispro Injectable (ADMELOG) 5 Unit(s) SubCutaneous three times a day before meals  metoprolol succinate ER 25 milliGRAM(s) Oral every 12 hours  sacubitril 97 mG/valsartan 103 mG 1 Tablet(s) Oral two times a day  spironolactone 25 milliGRAM(s) Oral daily    MEDICATIONS  (PRN):  acetaminophen     Tablet .. 650 milliGRAM(s) Oral every 6 hours PRN Mild Pain (1 - 3)  albuterol    90 MICROgram(s) HFA Inhaler 2 Puff(s) Inhalation every 6 hours PRN for shortness of breath and/or wheezing  dextrose Oral Gel 15 Gram(s) Oral once PRN Blood Glucose LESS THAN 70 milliGRAM(s)/deciliter  melatonin 3 milliGRAM(s) Oral at bedtime PRN Sleep      Allergies  No Known Allergies  Intolerances: SGLT2i reported genital rash         SOCIAL HISTORY:  FAMILY HISTORY:      Vital Signs Last 24 Hrs  T(C): 36.8 (06 May 2025 08:48), Max: 37.2 (05 May 2025 17:37)  T(F): 98.2 (06 May 2025 08:48), Max: 98.9 (05 May 2025 17:37)  HR: 88 (06 May 2025 08:48) (80 - 91)  BP: 101/61 (06 May 2025 08:48) (89/58 - 107/63)  BP(mean): 77 (06 May 2025 04:59) (69 - 80)  RR: 19 (06 May 2025 08:48) (18 - 19)  SpO2: 100% (06 May 2025 08:48) (95% - 100%)    Parameters below as of 06 May 2025 08:48  Patient On (Oxygen Delivery Method): room air        PHYSICAL EXAM:  General: No distress. Comfortable.  HEENT: EOM intact.  Neck: Neck supple. JVP 12 cm of H2O with +HJR  Chest: Clear to auscultation bilaterally  CV: Normal S1 and S2. No murmurs, rub, or gallops. Radial pulses normal.  Abdomen: Soft, non-distended, non-tender  Skin: No rashes or skin breakdown. Warm peripherally  BLE: 1+ pretibial edema.   Neurology: Alert and oriented times three. Sensation intact  Psych: Affect normal      LABS:                        14.1   8.29  )-----------( 343      ( 06 May 2025 07:04 )             41.7     05-06    139  |  101  |  22  ----------------------------<  92  4.2   |  26  |  1.23    Ca    8.9      06 May 2025 07:04  Mg     2.0     05-06    TPro  6.2  /  Alb  3.6  /  TBili  0.6  /  DBili  x   /  AST  14  /  ALT  22  /  AlkPhos  109  05-06      Urinalysis Basic - ( 06 May 2025 07:04 )    Color: x / Appearance: x / SG: x / pH: x  Gluc: 92 mg/dL / Ketone: x  / Bili: x / Urobili: x   Blood: x / Protein: x / Nitrite: x   Leuk Esterase: x / RBC: x / WBC x   Sq Epi: x / Non Sq Epi: x / Bacteria: x        RADIOLOGY & ADDITIONAL STUDIES:

## 2025-05-06 NOTE — PROGRESS NOTE ADULT - SUBJECTIVE AND OBJECTIVE BOX
Interventional Cardiology PA Adult Progress Note    Subjective Assessment:  Patient seen and examined at bedside. Appears comfortable and in no acute distress. Patient states to be feeling well. Denies CP, SOB, dizziness, palpitations, N/V/D, abdominal pain.     ROS Negative except as per Subjective and HPI  	  MEDICATIONS:  furosemide    Tablet 40 milliGRAM(s) Oral every 24 hours  sacubitril 97 mG/valsartan 103 mG 1 Tablet(s) Oral two times a day  spironolactone 25 milliGRAM(s) Oral daily  albuterol    90 MICROgram(s) HFA Inhaler 2 Puff(s) Inhalation every 6 hours PRN  acetaminophen     Tablet .. 650 milliGRAM(s) Oral every 6 hours PRN  melatonin 3 milliGRAM(s) Oral at bedtime PRN  atorvastatin 40 milliGRAM(s) Oral at bedtime  dextrose 50% Injectable 25 Gram(s) IV Push once  dextrose 50% Injectable 12.5 Gram(s) IV Push once  dextrose 50% Injectable 25 Gram(s) IV Push once  dextrose Oral Gel 15 Gram(s) Oral once PRN  glucagon  Injectable 1 milliGRAM(s) IntraMuscular once  insulin glargine Injectable (LANTUS) 12 Unit(s) SubCutaneous at bedtime  insulin lispro (ADMELOG) corrective regimen sliding scale   SubCutaneous Before meals and at bedtime  insulin lispro Injectable (ADMELOG) 5 Unit(s) SubCutaneous three times a day before meals  aspirin enteric coated 81 milliGRAM(s) Oral daily  clopidogrel Tablet 75 milliGRAM(s) Oral daily  dextrose 5%. 1000 milliLiter(s) IV Continuous <Continuous>  dextrose 5%. 1000 milliLiter(s) IV Continuous <Continuous>  enoxaparin Injectable 40 milliGRAM(s) SubCutaneous every 24 hours    TELEMETRY:  T(C): 36.8 (05-06-25 @ 08:48), Max: 37.2 (05-05-25 @ 17:37)  HR: 86 (05-06-25 @ 12:05) (80 - 91)  BP: 109/62 (05-06-25 @ 12:05) (89/58 - 109/62)  RR: 18 (05-06-25 @ 12:05) (18 - 19)  SpO2: 100% (05-06-25 @ 12:05) (95% - 100%)  Wt(kg): --    I&O's Summary    05 May 2025 07:01  -  06 May 2025 07:00  --------------------------------------------------------  IN: 960 mL / OUT: 2650 mL / NET: -1690 mL    06 May 2025 07:01  -  06 May 2025 14:41  --------------------------------------------------------  IN: 360 mL / OUT: 500 mL / NET: -140 mL    Pritchett:  Central/PICC/Mid Line:                                         PHYSICAL EXAM:  Appearance: Normal	  HEENT:  Normal oral mucosa, PERRL, EOMI	  Neck: Supple, + JVD  Cardiovascular: Normal S1 S2, No JVD, No murmurs, rubs, gallops.   Respiratory: Lungs clear to auscultation/No Rales, Rhonchi, Wheezing	  Gastrointestinal:  Soft, Non-tender, + BS	  Skin: No rashes, No ecchymoses, No cyanosis  Extremities: +2 bipedal edema. Normal range of motion, No clubbing, cyanosis  Vascular: Peripheral pulses palpable 2+ bilaterally  Neurologic: Non-focal  Psychiatry: A & O x 3, Mood & affect appropriate	    LABS:	 	                        14.1   8.29  )-----------( 343      ( 06 May 2025 07:04 )             41.7     05-06    139  |  101  |  22  ----------------------------<  92  4.2   |  26  |  1.23    Ca    8.9      06 May 2025 07:04  Mg     2.0     05-06    TPro  6.2  /  Alb  3.6  /  TBili  0.6  /  DBili  x   /  AST  14  /  ALT  22  /  AlkPhos  109  05-06       Interventional Cardiology PA Adult Progress Note    Subjective Assessment:  Patient seen and examined at bedside. Appears comfortable and in no acute distress. Patient states to be feeling well. Denies CP, SOB, dizziness, palpitations, N/V/D, abdominal pain.     ROS Negative except as per Subjective and HPI  	  MEDICATIONS:  furosemide    Tablet 40 milliGRAM(s) Oral every 24 hours  sacubitril 97 mG/valsartan 103 mG 1 Tablet(s) Oral two times a day  spironolactone 25 milliGRAM(s) Oral daily  albuterol    90 MICROgram(s) HFA Inhaler 2 Puff(s) Inhalation every 6 hours PRN  acetaminophen     Tablet .. 650 milliGRAM(s) Oral every 6 hours PRN  melatonin 3 milliGRAM(s) Oral at bedtime PRN  atorvastatin 40 milliGRAM(s) Oral at bedtime  dextrose 50% Injectable 25 Gram(s) IV Push once  dextrose 50% Injectable 12.5 Gram(s) IV Push once  dextrose 50% Injectable 25 Gram(s) IV Push once  dextrose Oral Gel 15 Gram(s) Oral once PRN  glucagon  Injectable 1 milliGRAM(s) IntraMuscular once  insulin glargine Injectable (LANTUS) 12 Unit(s) SubCutaneous at bedtime  insulin lispro (ADMELOG) corrective regimen sliding scale   SubCutaneous Before meals and at bedtime  insulin lispro Injectable (ADMELOG) 5 Unit(s) SubCutaneous three times a day before meals  aspirin enteric coated 81 milliGRAM(s) Oral daily  clopidogrel Tablet 75 milliGRAM(s) Oral daily  dextrose 5%. 1000 milliLiter(s) IV Continuous <Continuous>  dextrose 5%. 1000 milliLiter(s) IV Continuous <Continuous>  enoxaparin Injectable 40 milliGRAM(s) SubCutaneous every 24 hours    TELEMETRY:  T(C): 36.8 (05-06-25 @ 08:48), Max: 37.2 (05-05-25 @ 17:37)  HR: 86 (05-06-25 @ 12:05) (80 - 91)  BP: 109/62 (05-06-25 @ 12:05) (89/58 - 109/62)  RR: 18 (05-06-25 @ 12:05) (18 - 19)  SpO2: 100% (05-06-25 @ 12:05) (95% - 100%)  Wt(kg): --    I&O's Summary    05 May 2025 07:01  -  06 May 2025 07:00  --------------------------------------------------------  IN: 960 mL / OUT: 2650 mL / NET: -1690 mL    06 May 2025 07:01  -  06 May 2025 14:41  --------------------------------------------------------  IN: 360 mL / OUT: 500 mL / NET: -140 mL    Pritchett:  Central/PICC/Mid Line:                                         PHYSICAL EXAM:  Appearance: Normal	  HEENT:  Normal oral mucosa, PERRL, EOMI	  Neck: Supple, + JVD  Cardiovascular: Normal S1 S2, No JVD, No murmurs, rubs, gallops.   Respiratory: Lungs clear to auscultation/No Rales, Rhonchi, Wheezing	  Gastrointestinal:  Soft, Non-tender, + BS	  Skin: No rashes, No ecchymoses, No cyanosis  Extremities: +2 bipedal edema. Normal range of motion, No clubbing, cyanosis  Neurologic: Non-focal  Psychiatry: A & O x 3, Mood & affect appropriate	    LABS:	 	                        14.1   8.29  )-----------( 343      ( 06 May 2025 07:04 )             41.7     05-06    139  |  101  |  22  ----------------------------<  92  4.2   |  26  |  1.23    Ca    8.9      06 May 2025 07:04  Mg     2.0     05-06    TPro  6.2  /  Alb  3.6  /  TBili  0.6  /  DBili  x   /  AST  14  /  ALT  22  /  AlkPhos  109  05-06       Interventional Cardiology PA Adult Progress Note    Subjective Assessment: Pt reports feeling well today. Pt currently denies any CP, SOB, MARTINES, orthopnea, PND, palpitations, dizziness, lightheadedness, syncope, diaphoresis, LE edema, N/V/D, hematochezia, melena, hematuria, hematemesis, fever, chills, URI symptoms.   	  MEDICATIONS:  furosemide    Tablet 40 milliGRAM(s) Oral every 24 hours  sacubitril 97 mG/valsartan 103 mG 1 Tablet(s) Oral two times a day  spironolactone 25 milliGRAM(s) Oral daily  albuterol    90 MICROgram(s) HFA Inhaler 2 Puff(s) Inhalation every 6 hours PRN  acetaminophen     Tablet .. 650 milliGRAM(s) Oral every 6 hours PRN  melatonin 3 milliGRAM(s) Oral at bedtime PRN  atorvastatin 40 milliGRAM(s) Oral at bedtime  dextrose 50% Injectable 25 Gram(s) IV Push once  dextrose 50% Injectable 12.5 Gram(s) IV Push once  dextrose 50% Injectable 25 Gram(s) IV Push once  dextrose Oral Gel 15 Gram(s) Oral once PRN  glucagon  Injectable 1 milliGRAM(s) IntraMuscular once  insulin glargine Injectable (LANTUS) 12 Unit(s) SubCutaneous at bedtime  insulin lispro (ADMELOG) corrective regimen sliding scale   SubCutaneous Before meals and at bedtime  insulin lispro Injectable (ADMELOG) 5 Unit(s) SubCutaneous three times a day before meals  aspirin enteric coated 81 milliGRAM(s) Oral daily  clopidogrel Tablet 75 milliGRAM(s) Oral daily  dextrose 5%. 1000 milliLiter(s) IV Continuous <Continuous>  dextrose 5%. 1000 milliLiter(s) IV Continuous <Continuous>  enoxaparin Injectable 40 milliGRAM(s) SubCutaneous every 24 hours    TELEMETRY:  T(C): 36.8 (05-06-25 @ 08:48), Max: 37.2 (05-05-25 @ 17:37)  HR: 86 (05-06-25 @ 12:05) (80 - 91)  BP: 109/62 (05-06-25 @ 12:05) (89/58 - 109/62)  RR: 18 (05-06-25 @ 12:05) (18 - 19)  SpO2: 100% (05-06-25 @ 12:05) (95% - 100%)  Wt(kg): --    I&O's Summary    05 May 2025 07:01  -  06 May 2025 07:00  --------------------------------------------------------  IN: 960 mL / OUT: 2650 mL / NET: -1690 mL    06 May 2025 07:01  -  06 May 2025 14:41  --------------------------------------------------------  IN: 360 mL / OUT: 500 mL / NET: -140 mL    Pritchett:  Central/PICC/Mid Line:                                         PHYSICAL EXAM:  Appearance: Normal	  HEENT:  Normal oral mucosa, PERRL, EOMI	  Neck: Supple, + JVD  Cardiovascular: Normal S1 S2, No JVD, No murmurs, rubs, gallops  Respiratory: Lungs clear to auscultation B/L/No Rales, Rhonchi, Wheezing	  Gastrointestinal:  Soft, Non-tender, + BS	  Skin: No rashes, No ecchymoses, No cyanosis  Extremities: +2 bipedal edema. Normal range of motion, No clubbing, cyanosis  Neurologic: Non-focal  Psychiatry: A & O x 3, Mood & affect appropriate	    LABS:	 	                        14.1   8.29  )-----------( 343      ( 06 May 2025 07:04 )             41.7     05-06    139  |  101  |  22  ----------------------------<  92  4.2   |  26  |  1.23    Ca    8.9      06 May 2025 07:04  Mg     2.0     05-06    TPro  6.2  /  Alb  3.6  /  TBili  0.6  /  DBili  x   /  AST  14  /  ALT  22  /  AlkPhos  109  05-06

## 2025-05-06 NOTE — PROGRESS NOTE ADULT - ATTENDING COMMENTS
Pt seen on rounds this afternoon.  Denied any SOB, and was breathing comfortably lying supine in bed  Lungs were essentially clear.  He still had 1+ edema in the lower extremities  PO intake has been excellent  He again dropped to a fingerstick of 85 mg% before breakfast, but his Lantus was not decreased as suggested yesterday--he again received 24 units.  Will decrease the Lantus to 12 units for tonight, but we may need to move up the dose partially--will assess tonight's overnight control  Continue premeal lispro at 5 units

## 2025-05-06 NOTE — PROGRESS NOTE ADULT - SUBJECTIVE AND OBJECTIVE BOX
SUBJECTIVE / INTERVAL HPI: Patient was seen and examined this morning. He has no complaints today. For Dinner he had chicken and mashed potatoes. For breakfast cream of wheat and english muffin. Possible discharge tomorrow.     Overnight events: No events.     CAPILLARY BLOOD GLUCOSE & INSULIN RECEIVED  115 mg/dL (05-05 @ 21:55) lantus 24  85 mg/dL (05-06 @ 07:49)  5+ 0  161 mg/dL (05-06 @ 11:55) 5 + 2  189 mg/dL (05-06 @ 16:50) 5 + 2      REVIEW OF SYSTEMS  Constitutional:  Negative fever, chills   Cardiovascular:  Negative for chest pain or palpitations.  Respiratory:  Negative for cough, wheezing, or shortness of breath.    Gastrointestinal:  Negative for nausea, vomiting, diarrhea, constipation, or abdominal pain.  Genitourinary:  Negative frequency, urgency or dysuria.  Neurologic:  No headache, confusion, dizziness, lightheadedness.    PHYSICAL EXAM  Vital Signs Last 24 Hrs  T(C): 36.8 (06 May 2025 08:48), Max: 37.2 (05 May 2025 17:37)  T(F): 98.2 (06 May 2025 08:48), Max: 98.9 (05 May 2025 17:37)  HR: 86 (06 May 2025 12:05) (80 - 91)  BP: 109/62 (06 May 2025 12:05) (89/58 - 109/62)  BP(mean): 77 (06 May 2025 04:59) (69 - 80)  RR: 18 (06 May 2025 12:05) (18 - 19)  SpO2: 100% (06 May 2025 12:05) (95% - 100%)    Parameters below as of 06 May 2025 12:05  Patient On (Oxygen Delivery Method): room air      Constitutional: Awake, alert, in no acute distress.   HEENT: Normocephalic, atraumatic,   Respiratory: Lungs clear to ausculation bilaterally.   Cardiovascular: regular rhythm, normal S1 and S2, no audible murmurs.   GI: soft, non-tender, non-distended, bowel sounds present.  Extremities: No lower extremity edema.     LABS  CBC - WBC/HGB/HTC/PLT: 8.29/14.1/41.7/343 (05-06-25)  BMP - Na/K/Cl/Bicarb/BUN/Cr/Gluc/AG/eGFR: 139/4.2/101/26/22/1.23/92/12/66 (05-06-25)  Ca - 8.9 (05-06-25)  Phos - -- (05-06-25)  Mg - 2.0 (05-06-25)  LFT - Alb/Tprot/Tbili/Dbili/AlkPhos/ALT/AST: 3.6/--/0.6/--/109/22/14 (05-06-25)    Thyroid Stimulating Hormone, Serum: 0.371 (05-03-25)  Total T4/Free T4: --/1.460 (05-03-25)      05-05-25 @ 07:01  -  05-06-25 @ 07:00  --------------------------------------------------------  IN: 960 mL / OUT: 2650 mL / NET: -1690 mL    05-06-25 @ 07:01  -  05-06-25 @ 17:02  --------------------------------------------------------  IN: 360 mL / OUT: 500 mL / NET: -140 mL        MEDICATIONS  MEDICATIONS  (STANDING):  aspirin enteric coated 81 milliGRAM(s) Oral daily  atorvastatin 40 milliGRAM(s) Oral at bedtime  clopidogrel Tablet 75 milliGRAM(s) Oral daily  dextrose 5%. 1000 milliLiter(s) (100 mL/Hr) IV Continuous <Continuous>  dextrose 5%. 1000 milliLiter(s) (50 mL/Hr) IV Continuous <Continuous>  dextrose 50% Injectable 25 Gram(s) IV Push once  dextrose 50% Injectable 12.5 Gram(s) IV Push once  dextrose 50% Injectable 25 Gram(s) IV Push once  enoxaparin Injectable 40 milliGRAM(s) SubCutaneous every 24 hours  furosemide    Tablet 40 milliGRAM(s) Oral every 24 hours  glucagon  Injectable 1 milliGRAM(s) IntraMuscular once  insulin glargine Injectable (LANTUS) 12 Unit(s) SubCutaneous at bedtime  insulin lispro (ADMELOG) corrective regimen sliding scale   SubCutaneous Before meals and at bedtime  insulin lispro Injectable (ADMELOG) 5 Unit(s) SubCutaneous three times a day before meals  sacubitril 97 mG/valsartan 103 mG 1 Tablet(s) Oral two times a day  spironolactone 25 milliGRAM(s) Oral daily    MEDICATIONS  (PRN):  acetaminophen     Tablet .. 650 milliGRAM(s) Oral every 6 hours PRN Mild Pain (1 - 3)  albuterol    90 MICROgram(s) HFA Inhaler 2 Puff(s) Inhalation every 6 hours PRN for shortness of breath and/or wheezing  dextrose Oral Gel 15 Gram(s) Oral once PRN Blood Glucose LESS THAN 70 milliGRAM(s)/deciliter  melatonin 3 milliGRAM(s) Oral at bedtime PRN Sleep    ASSESSMENT / RECOMMENDATIONS  Mr. Greenfield is a 64 year-old man with history of coronary artery disease, current smoker, chronic obstructive pulmonary disease, chronic systolic heart failure, type 2 diabetes mellitus (HbA1c 12.0% during hospital admission) admitted with acute on chronic systolic heart failure.     Endocrine is consulted for type 2 diabetes management (chronic, exacerbated).    Home meds:  He was discharged home last admission on Tresiba 28 units Novolog 12 units with meals, Farxiga 10 mg daily, and Mounjaro 2.5 mg weekly. He was most recently taking only Tresiba 30 units daily. He discontinued Farxiga and Mounjaro after his prescription from the hospital finished. He stopped Novolog approximately two months ago due to a lapsed in his prescription    A1C: 12.0 %  Creatinine: 1.23, GFR: 66  Weight: 90.7, BMI: 31.3  EF 22%     # Type 2 diabetes mellitus with hyperglycemia  - Decrease lantus to 12 units at bedtime.   - C/w  lispro to 5 units before each meal.  - Continue lispro moderate w dose sliding scale before meals and at bedtime.  - Patient's fingerstick glucose goal is 100-180 mg/dL.    - CDCES consulted.  - For discharge: Basal/bolus insulin, dose TBD. Can go on current regimen if FS remain stable.   - Patient can follow up at discharge with [facility] Physician [facility]:    He prefers to follow with a Health system diabetologist in [place] on discharge:  1) Advised him to ask his [place] cardiologist for endocrine referral, OT  2) Nico Astorga M.D. endocrinology  [address] [place] [address] [phone]    #Thyroid nodule   - THYROID ULTRASOUND with Doppler dated 2/1/2023 9:04 PM  IMPRESSION:  Enlarged multinodular thyroid gland. The 3 thyroid nodules detailed above meet criteria for fine-needle aspiration biopsy.  - Patient reports having b/l FNA several years ago that was benign.  - Thyroid Stimulating Hormone, Serum: 0.371 (05-03-25)  - Total T4/Free T4: --/1.460 (05-03-25)  - Outpatient monitoring needed.    Case discussed with Dr. Norman. Primary team updated.     Teagan Canas   Endocrinology Fellow    Service Pager: [phone]

## 2025-05-06 NOTE — CONSULT NOTE ADULT - PROBLEM SELECTOR RECOMMENDATION 9
- Stable and less symptomatic, mildly volume overloaded  - Etiology: while patient recall history of coronary JANAK, no evidence on LHC from 2023  - GDMT: current regimen is Entresto  mg BID, Toprol XL 25 mg BID and Spironolactone 25 mg QD. Today would INCREASE metoprolol succinate to 50 mg daily. Defer SGLT2i as patient reports perineal rash likely in the setting of SGLT2i and poor glycemic control.   - Diuretics: Last dose of Lasix 40 mg IVP on 5/5. TODAY transition to Lasix 40 mg po BID. Tomorrow 5/7 will likely continue Lasix at 40 mg daily.   - Device: has single chamber ICD  - Follow-up with Dr. Montoya at [facility] on discharge

## 2025-05-07 ENCOUNTER — TRANSCRIPTION ENCOUNTER (OUTPATIENT)
Age: 65
End: 2025-05-07

## 2025-05-07 VITALS
HEART RATE: 88 BPM | RESPIRATION RATE: 18 BRPM | OXYGEN SATURATION: 98 % | SYSTOLIC BLOOD PRESSURE: 92 MMHG | DIASTOLIC BLOOD PRESSURE: 55 MMHG

## 2025-05-07 LAB
ALBUMIN SERPL ELPH-MCNC: 3.4 G/DL — SIGNIFICANT CHANGE UP (ref 3.3–5)
ALP SERPL-CCNC: 95 U/L — SIGNIFICANT CHANGE UP (ref 40–120)
ALT FLD-CCNC: 17 U/L — SIGNIFICANT CHANGE UP (ref 10–45)
ANION GAP SERPL CALC-SCNC: 11 MMOL/L — SIGNIFICANT CHANGE UP (ref 5–17)
AST SERPL-CCNC: 13 U/L — SIGNIFICANT CHANGE UP (ref 10–40)
BILIRUB SERPL-MCNC: 0.6 MG/DL — SIGNIFICANT CHANGE UP (ref 0.2–1.2)
BUN SERPL-MCNC: 23 MG/DL — SIGNIFICANT CHANGE UP (ref 7–23)
CALCIUM SERPL-MCNC: 8.9 MG/DL — SIGNIFICANT CHANGE UP (ref 8.4–10.5)
CHLORIDE SERPL-SCNC: 102 MMOL/L — SIGNIFICANT CHANGE UP (ref 96–108)
CO2 SERPL-SCNC: 25 MMOL/L — SIGNIFICANT CHANGE UP (ref 22–31)
CREAT SERPL-MCNC: 1.25 MG/DL — SIGNIFICANT CHANGE UP (ref 0.5–1.3)
EGFR: 65 ML/MIN/1.73M2 — SIGNIFICANT CHANGE UP
EGFR: 65 ML/MIN/1.73M2 — SIGNIFICANT CHANGE UP
GLUCOSE SERPL-MCNC: 86 MG/DL — SIGNIFICANT CHANGE UP (ref 70–99)
HCT VFR BLD CALC: 41.3 % — SIGNIFICANT CHANGE UP (ref 39–50)
HGB BLD-MCNC: 14.1 G/DL — SIGNIFICANT CHANGE UP (ref 13–17)
MAGNESIUM SERPL-MCNC: 1.8 MG/DL — SIGNIFICANT CHANGE UP (ref 1.6–2.6)
MCHC RBC-ENTMCNC: 31.1 PG — SIGNIFICANT CHANGE UP (ref 27–34)
MCHC RBC-ENTMCNC: 34.1 G/DL — SIGNIFICANT CHANGE UP (ref 32–36)
MCV RBC AUTO: 91.2 FL — SIGNIFICANT CHANGE UP (ref 80–100)
NRBC BLD AUTO-RTO: 0 /100 WBCS — SIGNIFICANT CHANGE UP (ref 0–0)
PLATELET # BLD AUTO: 336 K/UL — SIGNIFICANT CHANGE UP (ref 150–400)
POTASSIUM SERPL-MCNC: 4.1 MMOL/L — SIGNIFICANT CHANGE UP (ref 3.5–5.3)
POTASSIUM SERPL-SCNC: 4.1 MMOL/L — SIGNIFICANT CHANGE UP (ref 3.5–5.3)
PROT SERPL-MCNC: 5.9 G/DL — LOW (ref 6–8.3)
RBC # BLD: 4.53 M/UL — SIGNIFICANT CHANGE UP (ref 4.2–5.8)
RBC # FLD: 12.9 % — SIGNIFICANT CHANGE UP (ref 10.3–14.5)
SODIUM SERPL-SCNC: 138 MMOL/L — SIGNIFICANT CHANGE UP (ref 135–145)
WBC # BLD: 7.98 K/UL — SIGNIFICANT CHANGE UP (ref 3.8–10.5)
WBC # FLD AUTO: 7.98 K/UL — SIGNIFICANT CHANGE UP (ref 3.8–10.5)

## 2025-05-07 PROCEDURE — 36415 COLL VENOUS BLD VENIPUNCTURE: CPT

## 2025-05-07 PROCEDURE — 83880 ASSAY OF NATRIURETIC PEPTIDE: CPT

## 2025-05-07 PROCEDURE — 83036 HEMOGLOBIN GLYCOSYLATED A1C: CPT

## 2025-05-07 PROCEDURE — 82553 CREATINE MB FRACTION: CPT

## 2025-05-07 PROCEDURE — 80061 LIPID PANEL: CPT

## 2025-05-07 PROCEDURE — 80053 COMPREHEN METABOLIC PANEL: CPT

## 2025-05-07 PROCEDURE — 82550 ASSAY OF CK (CPK): CPT

## 2025-05-07 PROCEDURE — 85027 COMPLETE CBC AUTOMATED: CPT

## 2025-05-07 PROCEDURE — 99239 HOSP IP/OBS DSCHRG MGMT >30: CPT

## 2025-05-07 PROCEDURE — 84484 ASSAY OF TROPONIN QUANT: CPT

## 2025-05-07 PROCEDURE — 84439 ASSAY OF FREE THYROXINE: CPT

## 2025-05-07 PROCEDURE — 96374 THER/PROPH/DIAG INJ IV PUSH: CPT

## 2025-05-07 PROCEDURE — 84100 ASSAY OF PHOSPHORUS: CPT

## 2025-05-07 PROCEDURE — 80048 BASIC METABOLIC PNL TOTAL CA: CPT

## 2025-05-07 PROCEDURE — 71045 X-RAY EXAM CHEST 1 VIEW: CPT

## 2025-05-07 PROCEDURE — 99232 SBSQ HOSP IP/OBS MODERATE 35: CPT

## 2025-05-07 PROCEDURE — 84443 ASSAY THYROID STIM HORMONE: CPT

## 2025-05-07 PROCEDURE — 83735 ASSAY OF MAGNESIUM: CPT

## 2025-05-07 PROCEDURE — 82962 GLUCOSE BLOOD TEST: CPT

## 2025-05-07 PROCEDURE — 93306 TTE W/DOPPLER COMPLETE: CPT

## 2025-05-07 PROCEDURE — 99285 EMERGENCY DEPT VISIT HI MDM: CPT | Mod: 25

## 2025-05-07 PROCEDURE — 93005 ELECTROCARDIOGRAM TRACING: CPT

## 2025-05-07 PROCEDURE — 85025 COMPLETE CBC W/AUTO DIFF WBC: CPT

## 2025-05-07 RX ORDER — INSULIN GLARGINE-YFGN 100 [IU]/ML
10 INJECTION, SOLUTION SUBCUTANEOUS
Qty: 1 | Refills: 2
Start: 2025-05-07 | End: 2025-08-04

## 2025-05-07 RX ORDER — INSULIN DEGLUDEC 100 U/ML
30 INJECTION, SOLUTION SUBCUTANEOUS
Refills: 0 | DISCHARGE

## 2025-05-07 RX ORDER — ASPIRIN 325 MG
1 TABLET ORAL
Qty: 0 | Refills: 0 | DISCHARGE
Start: 2025-05-07

## 2025-05-07 RX ORDER — SPIRONOLACTONE 25 MG
1 TABLET ORAL
Qty: 30 | Refills: 2
Start: 2025-05-07 | End: 2025-08-04

## 2025-05-07 RX ORDER — CLOPIDOGREL BISULFATE 75 MG/1
1 TABLET, FILM COATED ORAL
Qty: 0 | Refills: 0 | DISCHARGE
Start: 2025-05-07

## 2025-05-07 RX ORDER — ISOPROPYL ALCOHOL, BENZOCAINE .7; .06 ML/ML; ML/ML
0 SWAB TOPICAL
Qty: 100 | Refills: 1
Start: 2025-05-07

## 2025-05-07 RX ORDER — FUROSEMIDE 10 MG/ML
1 INJECTION INTRAMUSCULAR; INTRAVENOUS
Refills: 0
Start: 2025-05-07

## 2025-05-07 RX ORDER — METOPROLOL SUCCINATE 50 MG/1
1 TABLET, EXTENDED RELEASE ORAL
Qty: 30 | Refills: 2
Start: 2025-05-07 | End: 2025-08-04

## 2025-05-07 RX ORDER — INSULIN LISPRO 100 U/ML
8 INJECTION, SOLUTION INTRAVENOUS; SUBCUTANEOUS
Qty: 1 | Refills: 2
Start: 2025-05-07 | End: 2025-08-04

## 2025-05-07 RX ORDER — INSULIN GLARGINE-YFGN 100 [IU]/ML
10 INJECTION, SOLUTION SUBCUTANEOUS
Qty: 1 | Refills: 0
Start: 2025-05-07 | End: 2025-06-05

## 2025-05-07 RX ORDER — ATORVASTATIN CALCIUM 80 MG/1
1 TABLET, FILM COATED ORAL
Qty: 0 | Refills: 0 | DISCHARGE
Start: 2025-05-07

## 2025-05-07 RX ORDER — SPIRONOLACTONE 25 MG
1 TABLET ORAL
Qty: 30 | Refills: 0
Start: 2025-05-07 | End: 2025-06-05

## 2025-05-07 RX ORDER — SACUBITRIL AND VALSARTAN 6; 6 MG/1; MG/1
1 PELLET ORAL
Refills: 0 | DISCHARGE

## 2025-05-07 RX ORDER — MAGNESIUM OXIDE 400 MG
800 TABLET ORAL ONCE
Refills: 0 | Status: COMPLETED | OUTPATIENT
Start: 2025-05-07 | End: 2025-05-07

## 2025-05-07 RX ORDER — FUROSEMIDE 10 MG/ML
1 INJECTION INTRAMUSCULAR; INTRAVENOUS
Qty: 60 | Refills: 0
Start: 2025-05-07 | End: 2025-06-05

## 2025-05-07 RX ORDER — INSULIN GLARGINE-YFGN 100 [IU]/ML
0 INJECTION, SOLUTION SUBCUTANEOUS
Refills: 0
Start: 2025-05-07

## 2025-05-07 RX ORDER — SACUBITRIL AND VALSARTAN 6; 6 MG/1; MG/1
1 PELLET ORAL
Qty: 60 | Refills: 0
Start: 2025-05-07 | End: 2025-06-05

## 2025-05-07 RX ORDER — SACUBITRIL AND VALSARTAN 6; 6 MG/1; MG/1
1 PELLET ORAL
Qty: 60 | Refills: 2
Start: 2025-05-07 | End: 2025-08-04

## 2025-05-07 RX ORDER — METOPROLOL SUCCINATE 50 MG/1
1 TABLET, EXTENDED RELEASE ORAL
Qty: 30 | Refills: 0
Start: 2025-05-07 | End: 2025-06-05

## 2025-05-07 RX ORDER — INSULIN GLARGINE-YFGN 100 [IU]/ML
10 INJECTION, SOLUTION SUBCUTANEOUS AT BEDTIME
Refills: 0 | Status: DISCONTINUED | OUTPATIENT
Start: 2025-05-07 | End: 2025-05-07

## 2025-05-07 RX ORDER — INSULIN LISPRO 100 U/ML
8 INJECTION, SOLUTION INTRAVENOUS; SUBCUTANEOUS
Qty: 1 | Refills: 0
Start: 2025-05-07 | End: 2025-06-05

## 2025-05-07 RX ORDER — INSULIN LISPRO 100 U/ML
8 INJECTION, SOLUTION INTRAVENOUS; SUBCUTANEOUS
Refills: 0 | Status: DISCONTINUED | OUTPATIENT
Start: 2025-05-07 | End: 2025-05-07

## 2025-05-07 RX ORDER — INSULIN ASPART 100 [IU]/ML
8 INJECTION, SOLUTION INTRAVENOUS; SUBCUTANEOUS
Qty: 1 | Refills: 0
Start: 2025-05-07 | End: 2025-06-05

## 2025-05-07 RX ORDER — METOPROLOL SUCCINATE 50 MG/1
1 TABLET, EXTENDED RELEASE ORAL
Refills: 0 | DISCHARGE

## 2025-05-07 RX ADMIN — SACUBITRIL AND VALSARTAN 1 TABLET(S): 6; 6 PELLET ORAL at 05:50

## 2025-05-07 RX ADMIN — Medication 25 MILLIGRAM(S): at 05:50

## 2025-05-07 RX ADMIN — Medication 800 MILLIGRAM(S): at 08:23

## 2025-05-07 RX ADMIN — INSULIN LISPRO 5 UNIT(S): 100 INJECTION, SOLUTION INTRAVENOUS; SUBCUTANEOUS at 08:23

## 2025-05-07 RX ADMIN — Medication 81 MILLIGRAM(S): at 13:13

## 2025-05-07 RX ADMIN — INSULIN LISPRO 2: 100 INJECTION, SOLUTION INTRAVENOUS; SUBCUTANEOUS at 13:12

## 2025-05-07 RX ADMIN — FUROSEMIDE 40 MILLIGRAM(S): 10 INJECTION INTRAMUSCULAR; INTRAVENOUS at 09:19

## 2025-05-07 RX ADMIN — METOPROLOL SUCCINATE 50 MILLIGRAM(S): 50 TABLET, EXTENDED RELEASE ORAL at 05:49

## 2025-05-07 RX ADMIN — CLOPIDOGREL BISULFATE 75 MILLIGRAM(S): 75 TABLET, FILM COATED ORAL at 13:13

## 2025-05-07 NOTE — PROGRESS NOTE ADULT - NS ATTEND AMEND GEN_ALL_CORE FT
64M smoker w COPD, DMT2, Ischemic CMP HFrEF 30% s/p ICD who p/w worsening LE edema, MARTINES x 1 week. Labs notable for elevated BNP.    Acute on chronic decompensated HFrEF 35%  Stage C, warm still volume overloaded, appreciate HF recommendations, double oral lasix dose, anticipate discharge in am.  - resume GDMT: metop succ 25, Entresto , start MRA. Had some reported SE from SGLT2i (can start as OP).
single chamber ICD bio. normal device function.
64M PMHx Stage C idiopathic DCM (EF 23, EMERY 76, RV mild reduced, PASP 34) p/w acute on chronic decompensated systolic HF. Reports progressive LE edema over several days without clear trigger; had not called office or started taking diuretics. Since admission, significant improvement in edema per patient, but still with evidence of some volume overload that is rapidly improving. No cardiogenic shock, tolerating home neurohormonal therapy regimen.    Recommendations:  - BB: Toprol 50  - RAASi: Ent   - MRA: Ald 25  - SLGT2i: defer given prior perineal rash and poorly controlled DM  - Diuretics: discharge on Lasix 40 PO BID until wt decreases additional 3 lbs then advised to call office to decrease to Lasix 40 daily  - Devices: s/p single chamber ICD  - suggest endocrine follow-up,, will help arrange at [place]  - will arrange for HF follow-up with Dr. Montoya.
64M smoker w COPD, DMT2, Ischemic CMP HFrEF 30% s/p ICD who p/w worsening LE edema, MARTINES x 1 week. Labs notable for elevated BNP.    - still volume overloaded, another day of IV diuretics, maybe PO tomororw.  - resume GDMT: metop succ 25, Entresto , start MRA. Had some reported SE from SGLT2i (can start as OP)

## 2025-05-07 NOTE — DISCHARGE NOTE NURSING/CASE MANAGEMENT/SOCIAL WORK - NSDCPEFALRISK_GEN_ALL_CORE
For information on Fall & Injury Prevention, visit: [email or web address] OR  [email or web address] OR  https://www.cdc.gov/steadi/patient.html

## 2025-05-07 NOTE — PROGRESS NOTE ADULT - SUBJECTIVE AND OBJECTIVE BOX
Subjective:  - urinating well with improving LE swelling    Medications:  acetaminophen     Tablet .. 650 milliGRAM(s) Oral every 6 hours PRN  albuterol    90 MICROgram(s) HFA Inhaler 2 Puff(s) Inhalation every 6 hours PRN  aspirin enteric coated 81 milliGRAM(s) Oral daily  atorvastatin 40 milliGRAM(s) Oral at bedtime  clopidogrel Tablet 75 milliGRAM(s) Oral daily  dextrose 5%. 1000 milliLiter(s) IV Continuous <Continuous>  dextrose 5%. 1000 milliLiter(s) IV Continuous <Continuous>  dextrose 50% Injectable 25 Gram(s) IV Push once  dextrose 50% Injectable 12.5 Gram(s) IV Push once  dextrose 50% Injectable 25 Gram(s) IV Push once  dextrose Oral Gel 15 Gram(s) Oral once PRN  enoxaparin Injectable 40 milliGRAM(s) SubCutaneous every 24 hours  furosemide    Tablet 40 milliGRAM(s) Oral every 24 hours  glucagon  Injectable 1 milliGRAM(s) IntraMuscular once  insulin glargine Injectable (LANTUS) 12 Unit(s) SubCutaneous at bedtime  insulin lispro (ADMELOG) corrective regimen sliding scale   SubCutaneous Before meals and at bedtime  insulin lispro Injectable (ADMELOG) 5 Unit(s) SubCutaneous three times a day before meals  melatonin 3 milliGRAM(s) Oral at bedtime PRN  metoprolol succinate ER 50 milliGRAM(s) Oral daily  sacubitril 97 mG/valsartan 103 mG 1 Tablet(s) Oral two times a day  spironolactone 25 milliGRAM(s) Oral daily    Physical Exam:    Vitals:  Vital Signs Last 24 Hours  T(C): 36.6 (25 @ 08:22), Max: 36.8 (25 @ 20:25)  HR: 88 (25 @ 08:22) (86 - 94)  BP: 96/54 (25 @ 08:22) (92/56 - 111/59)  RR: 18 (25 @ 08:22) (17 - 18)  SpO2: 97% (25 @ 08:22) (94% - 100%)    Weight in k.2 ( @ 06:55)    I&O's Summary    06 May 2025 07:01  -  07 May 2025 07:00  --------------------------------------------------------  IN: 1340 mL / OUT: 1880 mL / NET: -540 mL    Tele: SR 80-90s    General: NAD, comfortable  HEET: EOMI  Neck: JVP 10 cm H2O, no HJR  Cardiovascular: RRR, Normal S1, S2, no M/R/G  Chest: Clear to auscultation bilaterally  ABD: Soft, nontender, nondistended  Extremities: Warm peripherally. +1 BLE edema; improving  Psych: Appropriate mood and affect  Neuro:  A&Ox4, nonfocal     Labs:                        14.1   7.98  )-----------( 336      ( 07 May 2025 06:10 )             41.3     05    138  |  102  |  23  ----------------------------<  86  4.1   |  25  |  1.25    Ca    8.9      07 May 2025 06:10  Mg     1.8         TPro  5.9[L]  /  Alb  3.4  /  TBili  0.6  /  DBili  x   /  AST  13  /  ALT  17  /  AlkPhos  95

## 2025-05-07 NOTE — PROGRESS NOTE ADULT - PROBLEM SELECTOR PLAN 1
- Etiology: NICM. while patient recall history of coronary JANAK, no evidence on LHC from 2023  - GDMT: continue Entresto  mg BID, Toprol XL 50 mg QD and Spironolactone 25 mg QD. SGLT2-i previously discontinued for perineal rash.   - Diuretics: discharge on Lasix 40 mg BID and once weight reduces 3 lbs, to return to 40 mg QD and notify [facility] team.   - Device: has single chamber ICD  - Follow-up at [facility] on discharge, with the HF ACP in 1-2 weeks, then with Dr. Montoya - Etiology: NICM. while patient recall history of coronary JANAK, no evidence on LHC from 2023  - GDMT: continue Entresto  mg BID, Toprol XL 50 mg QD and Spironolactone 25 mg QD. SGLT2-i previously discontinued for perineal rash.   - Diuretics: discharge on Lasix 40 mg BID and once weight reduces 3 lbs, to return to 40 mg QD and notify [facility] team.   - Device: has single chamber ICD  - Follow-up at [facility] on discharge, with the HF ACP on 5/21 1PM, then with Dr. Montoya 6/24 2:40PM.

## 2025-05-07 NOTE — PROGRESS NOTE ADULT - SUBJECTIVE AND OBJECTIVE BOX
SUBJECTIVE / INTERVAL HPI: Patient was seen and examined this morning.   Endocrine is consulted for type 2 diabetes management (chronic, exacerbated).      CAPILLARY BLOOD GLUCOSE & INSULIN RECEIVED  115 mg/dL (05-05 @ 21:55)  92 mg/dL (05-06 @ 07:04)  85 mg/dL (05-06 @ 07:49)  161 mg/dL (05-06 @ 11:55)  189 mg/dL (05-06 @ 16:50)  216 mg/dL (05-06 @ 21:13)  188 mg/dL (05-06 @ 22:18)  86 mg/dL (05-07 @ 06:10)  102 mg/dL (05-07 @ 07:52)      REVIEW OF SYSTEMS  Constitutional:  Negative fever, chills or loss of appetite.  Eyes:  Negative blurry vision or double vision.  Cardiovascular:  Negative for chest pain or palpitations.  Respiratory:  Negative for cough, wheezing, or shortness of breath.    Gastrointestinal:  Negative for nausea, vomiting, diarrhea, constipation, or abdominal pain.  Genitourinary:  Negative frequency, urgency or dysuria.  Neurologic:  No headache, confusion, dizziness, lightheadedness.    PHYSICAL EXAM  Vital Signs Last 24 Hrs  T(C): 36.6 (07 May 2025 08:22), Max: 36.8 (06 May 2025 20:25)  T(F): 97.8 (07 May 2025 08:22), Max: 98.3 (06 May 2025 20:25)  HR: 88 (07 May 2025 08:22) (86 - 94)  BP: 96/54 (07 May 2025 08:22) (92/56 - 111/59)  BP(mean): 69 (07 May 2025 08:22) (68 - 79)  RR: 18 (07 May 2025 08:22) (17 - 18)  SpO2: 97% (07 May 2025 08:22) (94% - 100%)    Parameters below as of 07 May 2025 08:22  Patient On (Oxygen Delivery Method): room air      Constitutional: Awake, alert, in no acute distress.   HEENT: Normocephalic, atraumatic, ERIKA.  Respiratory: Lungs clear to ausculation bilaterally.   Cardiovascular: regular rhythm, normal S1 and S2, no audible murmurs.   GI: soft, non-tender, non-distended, bowel sounds present.  Extremities: b/l LES 1+ edema.  Psychiatric: AAO x 3. Normal affect/mood.     LABS  CBC - WBC/HGB/HTC/PLT: 7.98/14.1/41.3/336 (05-07-25)  BMP - Na/K/Cl/Bicarb/BUN/Cr/Gluc/AG/eGFR: 138/4.1/102/25/23/1.25/86/11/65 (05-07-25)  Ca - 8.9 (05-07-25)  Phos - -- (05-07-25)  Mg - 1.8 (05-07-25)  LFT - Alb/Tprot/Tbili/Dbili/AlkPhos/ALT/AST: 3.4/--/0.6/--/95/17/13 (05-07-25)    Thyroid Stimulating Hormone, Serum: 0.371 (05-03-25)  Total T4/Free T4: --/1.460 (05-03-25)    MEDICATIONS  MEDICATIONS  (STANDING):  aspirin enteric coated 81 milliGRAM(s) Oral daily  atorvastatin 40 milliGRAM(s) Oral at bedtime  clopidogrel Tablet 75 milliGRAM(s) Oral daily  dextrose 5%. 1000 milliLiter(s) (50 mL/Hr) IV Continuous <Continuous>  dextrose 5%. 1000 milliLiter(s) (100 mL/Hr) IV Continuous <Continuous>  dextrose 50% Injectable 25 Gram(s) IV Push once  dextrose 50% Injectable 12.5 Gram(s) IV Push once  dextrose 50% Injectable 25 Gram(s) IV Push once  enoxaparin Injectable 40 milliGRAM(s) SubCutaneous every 24 hours  furosemide    Tablet 40 milliGRAM(s) Oral every 24 hours  glucagon  Injectable 1 milliGRAM(s) IntraMuscular once  insulin glargine Injectable (LANTUS) 12 Unit(s) SubCutaneous at bedtime  insulin lispro (ADMELOG) corrective regimen sliding scale   SubCutaneous Before meals and at bedtime  insulin lispro Injectable (ADMELOG) 5 Unit(s) SubCutaneous three times a day before meals  metoprolol succinate ER 50 milliGRAM(s) Oral daily  sacubitril 97 mG/valsartan 103 mG 1 Tablet(s) Oral two times a day  spironolactone 25 milliGRAM(s) Oral daily    MEDICATIONS  (PRN):  acetaminophen     Tablet .. 650 milliGRAM(s) Oral every 6 hours PRN Mild Pain (1 - 3)  albuterol    90 MICROgram(s) HFA Inhaler 2 Puff(s) Inhalation every 6 hours PRN for shortness of breath and/or wheezing  dextrose Oral Gel 15 Gram(s) Oral once PRN Blood Glucose LESS THAN 70 milliGRAM(s)/deciliter  melatonin 3 milliGRAM(s) Oral at bedtime PRN Sleep         SUBJECTIVE / INTERVAL HPI: Patient was seen and examined this morning. No new complaints. Eating well. Planned for discharge home today.    Endocrine is consulted for type 2 diabetes management (chronic, exacerbated).  Discussed discharge recommendations with patient. Insulin requirements are pretty low (much less basal than at home), and can probably be transitioned to some sort of non-insulin agents +/- basal as an OP. Encouraged to maintain good diet and follow up with endocrinology.     CAPILLARY BLOOD GLUCOSE & INSULIN RECEIVED  115 mg/dL (05-05 @ 21:55) - Lantus 24  92 mg/dL (05-06 @ 07:04)  85 mg/dL (05-06 @ 07:49) - Lispro 5  161 mg/dL (05-06 @ 11:55) - Lispro 5+2  189 mg/dL (05-06 @ 16:50) - Lispro 5+2  216 mg/dL (05-06 @ 21:13)   188 mg/dL (05-06 @ 22:18) - Lantus 12 + lispro 2  86 mg/dL (05-07 @ 06:10)  102 mg/dL (05-07 @ 07:52) - Lispro 5  mg/dL (05-07 @ lunch)      REVIEW OF SYSTEMS  Constitutional:  Negative fever, chills or loss of appetite.  Eyes:  Negative blurry vision or double vision.  Cardiovascular:  Negative for chest pain or palpitations.  Respiratory:  Negative for cough, wheezing, or shortness of breath.    Gastrointestinal:  Negative for nausea, vomiting, diarrhea, constipation, or abdominal pain.  Genitourinary:  Negative frequency, urgency or dysuria.  Neurologic:  No headache, confusion, dizziness, lightheadedness.    PHYSICAL EXAM  Vital Signs Last 24 Hrs  T(C): 36.6 (07 May 2025 08:22), Max: 36.8 (06 May 2025 20:25)  T(F): 97.8 (07 May 2025 08:22), Max: 98.3 (06 May 2025 20:25)  HR: 88 (07 May 2025 08:22) (86 - 94)  BP: 96/54 (07 May 2025 08:22) (92/56 - 111/59)  BP(mean): 69 (07 May 2025 08:22) (68 - 79)  RR: 18 (07 May 2025 08:22) (17 - 18)  SpO2: 97% (07 May 2025 08:22) (94% - 100%)    Parameters below as of 07 May 2025 08:22  Patient On (Oxygen Delivery Method): room air      Constitutional: Awake, alert, in no acute distress.   HEENT: Normocephalic, atraumatic, ERIKA.  Respiratory: Lungs clear to ausculation bilaterally.   Cardiovascular: regular rhythm, normal S1 and S2, no audible murmurs.   GI: soft, non-tender, non-distended, bowel sounds present.  Extremities: b/l LES 1+ edema.  Psychiatric: AAO x 3. Normal affect/mood.     LABS  CBC - WBC/HGB/HTC/PLT: 7.98/14.1/41.3/336 (05-07-25)  BMP - Na/K/Cl/Bicarb/BUN/Cr/Gluc/AG/eGFR: 138/4.1/102/25/23/1.25/86/11/65 (05-07-25)  Ca - 8.9 (05-07-25)  Phos - -- (05-07-25)  Mg - 1.8 (05-07-25)  LFT - Alb/Tprot/Tbili/Dbili/AlkPhos/ALT/AST: 3.4/--/0.6/--/95/17/13 (05-07-25)    Thyroid Stimulating Hormone, Serum: 0.371 (05-03-25)  Total T4/Free T4: --/1.460 (05-03-25)    MEDICATIONS  MEDICATIONS  (STANDING):  aspirin enteric coated 81 milliGRAM(s) Oral daily  atorvastatin 40 milliGRAM(s) Oral at bedtime  clopidogrel Tablet 75 milliGRAM(s) Oral daily  dextrose 5%. 1000 milliLiter(s) (50 mL/Hr) IV Continuous <Continuous>  dextrose 5%. 1000 milliLiter(s) (100 mL/Hr) IV Continuous <Continuous>  dextrose 50% Injectable 25 Gram(s) IV Push once  dextrose 50% Injectable 12.5 Gram(s) IV Push once  dextrose 50% Injectable 25 Gram(s) IV Push once  enoxaparin Injectable 40 milliGRAM(s) SubCutaneous every 24 hours  furosemide    Tablet 40 milliGRAM(s) Oral every 24 hours  glucagon  Injectable 1 milliGRAM(s) IntraMuscular once  insulin glargine Injectable (LANTUS) 12 Unit(s) SubCutaneous at bedtime  insulin lispro (ADMELOG) corrective regimen sliding scale   SubCutaneous Before meals and at bedtime  insulin lispro Injectable (ADMELOG) 5 Unit(s) SubCutaneous three times a day before meals  metoprolol succinate ER 50 milliGRAM(s) Oral daily  sacubitril 97 mG/valsartan 103 mG 1 Tablet(s) Oral two times a day  spironolactone 25 milliGRAM(s) Oral daily    MEDICATIONS  (PRN):  acetaminophen     Tablet .. 650 milliGRAM(s) Oral every 6 hours PRN Mild Pain (1 - 3)  albuterol    90 MICROgram(s) HFA Inhaler 2 Puff(s) Inhalation every 6 hours PRN for shortness of breath and/or wheezing  dextrose Oral Gel 15 Gram(s) Oral once PRN Blood Glucose LESS THAN 70 milliGRAM(s)/deciliter  melatonin 3 milliGRAM(s) Oral at bedtime PRN Sleep         SUBJECTIVE / INTERVAL HPI: Patient was seen and examined this morning. No new complaints. Eating well. Planned for discharge home today.    Endocrine is consulted for type 2 diabetes management (chronic, exacerbated).  Discussed discharge recommendations with patient. Insulin requirements are pretty low (much less basal than at home), and can probably be transitioned to some sort of non-insulin agents +/- basal as an OP. Encouraged to maintain good diet and follow up with endocrinology.     CAPILLARY BLOOD GLUCOSE & INSULIN RECEIVED  115 mg/dL (05-05 @ 21:55) - Lantus 24  92 mg/dL (05-06 @ 07:04)  85 mg/dL (05-06 @ 07:49) - Lispro 5  161 mg/dL (05-06 @ 11:55) - Lispro 5+2  189 mg/dL (05-06 @ 16:50) - Lispro 5+2  216 mg/dL (05-06 @ 21:13)   188 mg/dL (05-06 @ 22:18) - Lantus 12 + lispro 2  86 mg/dL (05-07 @ 06:10)  102 mg/dL (05-07 @ 07:52) - Lispro 5  184 mg/dL (05-07 @ lunch)      REVIEW OF SYSTEMS  Constitutional:  Negative fever, chills or loss of appetite.  Eyes:  Negative blurry vision or double vision.  Cardiovascular:  Negative for chest pain or palpitations.  Respiratory:  Negative for cough, wheezing, or shortness of breath.    Gastrointestinal:  Negative for nausea, vomiting, diarrhea, constipation, or abdominal pain.  Genitourinary:  Negative frequency, urgency or dysuria.  Neurologic:  No headache, confusion, dizziness, lightheadedness.    PHYSICAL EXAM  Vital Signs Last 24 Hrs  T(C): 36.6 (07 May 2025 08:22), Max: 36.8 (06 May 2025 20:25)  T(F): 97.8 (07 May 2025 08:22), Max: 98.3 (06 May 2025 20:25)  HR: 88 (07 May 2025 08:22) (86 - 94)  BP: 96/54 (07 May 2025 08:22) (92/56 - 111/59)  BP(mean): 69 (07 May 2025 08:22) (68 - 79)  RR: 18 (07 May 2025 08:22) (17 - 18)  SpO2: 97% (07 May 2025 08:22) (94% - 100%)    Parameters below as of 07 May 2025 08:22  Patient On (Oxygen Delivery Method): room air      Constitutional: Awake, alert, in no acute distress.   HEENT: Normocephalic, atraumatic, ERIKA.  Respiratory: Lungs clear to ausculation bilaterally.   Cardiovascular: regular rhythm, normal S1 and S2, no audible murmurs.   GI: soft, non-tender, non-distended, bowel sounds present.  Extremities: b/l LES 1+ edema.  Psychiatric: AAO x 3. Normal affect/mood.     LABS  CBC - WBC/HGB/HTC/PLT: 7.98/14.1/41.3/336 (05-07-25)  BMP - Na/K/Cl/Bicarb/BUN/Cr/Gluc/AG/eGFR: 138/4.1/102/25/23/1.25/86/11/65 (05-07-25)  Ca - 8.9 (05-07-25)  Phos - -- (05-07-25)  Mg - 1.8 (05-07-25)  LFT - Alb/Tprot/Tbili/Dbili/AlkPhos/ALT/AST: 3.4/--/0.6/--/95/17/13 (05-07-25)    Thyroid Stimulating Hormone, Serum: 0.371 (05-03-25)  Total T4/Free T4: --/1.460 (05-03-25)    MEDICATIONS  MEDICATIONS  (STANDING):  aspirin enteric coated 81 milliGRAM(s) Oral daily  atorvastatin 40 milliGRAM(s) Oral at bedtime  clopidogrel Tablet 75 milliGRAM(s) Oral daily  dextrose 5%. 1000 milliLiter(s) (50 mL/Hr) IV Continuous <Continuous>  dextrose 5%. 1000 milliLiter(s) (100 mL/Hr) IV Continuous <Continuous>  dextrose 50% Injectable 25 Gram(s) IV Push once  dextrose 50% Injectable 12.5 Gram(s) IV Push once  dextrose 50% Injectable 25 Gram(s) IV Push once  enoxaparin Injectable 40 milliGRAM(s) SubCutaneous every 24 hours  furosemide    Tablet 40 milliGRAM(s) Oral every 24 hours  glucagon  Injectable 1 milliGRAM(s) IntraMuscular once  insulin glargine Injectable (LANTUS) 12 Unit(s) SubCutaneous at bedtime  insulin lispro (ADMELOG) corrective regimen sliding scale   SubCutaneous Before meals and at bedtime  insulin lispro Injectable (ADMELOG) 5 Unit(s) SubCutaneous three times a day before meals  metoprolol succinate ER 50 milliGRAM(s) Oral daily  sacubitril 97 mG/valsartan 103 mG 1 Tablet(s) Oral two times a day  spironolactone 25 milliGRAM(s) Oral daily    MEDICATIONS  (PRN):  acetaminophen     Tablet .. 650 milliGRAM(s) Oral every 6 hours PRN Mild Pain (1 - 3)  albuterol    90 MICROgram(s) HFA Inhaler 2 Puff(s) Inhalation every 6 hours PRN for shortness of breath and/or wheezing  dextrose Oral Gel 15 Gram(s) Oral once PRN Blood Glucose LESS THAN 70 milliGRAM(s)/deciliter  melatonin 3 milliGRAM(s) Oral at bedtime PRN Sleep

## 2025-05-07 NOTE — DISCHARGE NOTE NURSING/CASE MANAGEMENT/SOCIAL WORK - FINANCIAL ASSISTANCE
[facility] provides services at a reduced cost to those who are determined to be eligible through [facility]’s financial assistance program. Information regarding [facility]’s financial assistance program can be found by going to [email or web address] or by calling [phone].

## 2025-05-07 NOTE — PROGRESS NOTE ADULT - NS ATTEST RISK PROBLEM GEN_ALL_CORE FT
Pt admitted with CHF, poor diabetes control
preparation, patient care, and documentation for this visit, including the following: review of clinical lab tests; review of medical tests/procedures/services, obtaining and/or reviewing separately obtained history, counseling and educating the patient/family/caregiver, referring and communicating with other health care professionals (when not separately reported), documenting clinical information in the electronic health record, and care coordination (not separately reported).

## 2025-05-07 NOTE — PROGRESS NOTE ADULT - PROBLEM SELECTOR PLAN 2
- Poorly controlled with Ha1c of 12% this admission  - Management per endocrinology  - Please arrange for outpatient endocrinology follow up, he does not want to continue to follow current outpatient endocrinologist

## 2025-05-07 NOTE — PROGRESS NOTE ADULT - PROBLEM SELECTOR PROBLEM 1
DM2 (diabetes mellitus, type 2)
DM2 (diabetes mellitus, type 2)
Acute on chronic systolic congestive heart failure

## 2025-05-07 NOTE — PROGRESS NOTE ADULT - TIME BILLING
Review of data and PO intake.  Adjustments in insulin regimen
icd interrogation
Bedside exam and interview.  Reviewed labs and glucose.  Insulin adjustment.  Education (Diet, medication.  Discussed plan of care with primary team.  Documentation of encounter.
Bedside exam and interview.  Reviewed labs and glucose.  Insulin adjustment.  Education (Diet, medication, diabetes devices).  Discussed plan of care with primary team.  Documentation of encounter.

## 2025-05-07 NOTE — PROGRESS NOTE ADULT - PROVIDER SPECIALTY LIST ADULT
Cardiology
Endocrinology
Cardiology
Cardiology
Electrophysiology
Endocrinology
Endocrinology
Heart Failure

## 2025-05-07 NOTE — PROGRESS NOTE ADULT - REASON FOR ADMISSION
worsening MARTINES with leg swelling x 1 week

## 2025-05-07 NOTE — PROGRESS NOTE ADULT - ASSESSMENT
Mr. Greenfield is a 64 year-old man with history of coronary artery disease, current smoker, chronic obstructive pulmonary disease, chronic systolic heart failure, type 2 diabetes mellitus (HbA1c 12.0% during hospital admission) admitted with acute on chronic systolic heart failure.     Endocrine is consulted for type 2 diabetes management (chronic, exacerbated).    Home meds:  He was discharged home last admission on Tresiba 28 units Novolog 12 units with meals, Farxiga 10 mg daily, and Mounjaro 2.5 mg weekly. He was most recently taking only Tresiba 30 units daily. He discontinued Farxiga and Mounjaro after his prescription from the hospital finished. He stopped Novolog approximately two months ago due to a lapsed in his prescription  24 hour glucose data reviewed.  Insulin adjustment    A1C: 12.0 %  BUN: 24  Creatinine: 1.24  GFR: 65  Weight: 90.7  BMI: 31.3  EF: 23%
Mr. Gerenfield is a 64 year old man with ACC/AHA Stage C (LV 7.6 cm, LVEF 23%) s/p single chamber ICD (2006), HTN, CKD (baseline Cr 1.3-1.5), poorly controlled DMT2 (Ha1c 12%), COPD and active smoker who presented with ADHF. He follows Dr. Montoya and was last seen in clinic December 2024 on excellent doses of GDMT with infrequent use of PRN Lasix. This admission, had run out of PRN diuretics for several weeks but reported compliance to GDMT. With intermittent IV diuretics, symptomatically improving with reduction of pro-BNP from 68417 to 2800.     He is near euvolemic and back to tolerating mod-high dose GDMT. He is clinically stable for discharge, to follow-[facility] with [facility] HF team. Will have him continue BID dosing of Lasix outpatient until weight reduces further by 3 lbs.       Cardiac Studies  ·	TTE 5/3/25: LVIDd 7.6 cm, LVEF 23%, increased LV mass and eccentric hypertrophy (setpum 0.7, PWT 1.1), normal RV size with borderline reduced function (TAPSE 1.6), severe LAE, dilated RA, mild MR, trace TR, est PASP 34 mmHg, IVC normal size   ·	TTE 1/17/25: LVIDd 5.8 cm, LVEF 30% (global), mild LVH (septum 1.2, PWT 1.3), mild DD, normal RV size/function (TAPSE 2.3), normal biatrial size, mild MR, trace TR, est PASP 20 mmHg  ·	RHC 2/2/23: BP 94/69/78, , RA 10, PA 24/27/17, PCWP 11, AO 96%, Pa 61%,  CO/CI (F) 3.75/1.8, SVR 1490 dsc, PVR 1.60 carver  ·	C 2/2/23: LM no disease, LAD minor irregularities, D1 minor irregularities, Cx minor irregularities, OM1 minor irregularities, RCA minor irregularities    Home medications: Lasix 40 PRN, Entresto  mg BID, Toprol XL 50 mg BID, Farxiga 10 mg QD, ASA, Atorva 40, Tresidba, Novolog    
Mr. Greenfiedl is a 64 year-old man with history of coronary artery disease, current smoker, chronic obstructive pulmonary disease, chronic systolic heart failure, type 2 diabetes mellitus (HbA1c 12.0% during hospital admission) admitted with acute on chronic systolic heart failure.     Endocrine is consulted for type 2 diabetes management (chronic, exacerbated).    Home meds:  He was discharged home last admission on Tresiba 28 units Novolog 12 units with meals, Farxiga 10 mg daily, and Mounjaro 2.5 mg weekly. He was most recently taking only Tresiba 30 units daily. He discontinued Farxiga and Mounjaro after his prescription from the hospital finished. He stopped Novolog approximately two months ago due to a lapsed in his prescription  24 hour glucose data reviewed.  Insulin adjustment    A1C: 12.0 %  BUN: 24  Creatinine: 1.24  GFR: 65  Weight: 90.7  BMI: 31.3  EF: 23%
63 yo M current smoker, h/o HTN, HLD, DM2, CAD s/p RCA PCI, HFrEF (EF 30% 1/2025), s/p Biotronik ICD, COPD (not on home O2) admitted to cardiac telemetry for HFrEF exacerbation, currently being IV diuresed, endo following for uncontrolled DM, possible discharge in AM.   
65 yo M current smoker, h/o HTN, HLD, DM2, CAD s/p RCA PCI, HFrEF (EF 30% 1/2025), s/p Biotronik ICD, COPD (not on home O2) admitted to cardiac telemetry for HFrEF exacerbation, Now s/p IV diuresis and transitioned to PO Lasix today. Device interrogated today by EP: normal function ICD. Followed by endo for uncontrolled DM. Plan for possible discharge in the AM. 
This is a 65 yo M current smoker, h/o HTN, HLD, DM2, CAD s/p RCA PCI, HFrEF (EF 30% 1/2025), s/p Biotronik ICD, COPD who presents to [facility] ED c/o 1 week of increasing leg swelling with MARTINES and PND i/s/o medication and dietary noncompliance.  Will admit for tele monitoring and IV diuresis.
65 yo M current smoker, h/o HTN, HLD, DM2, CAD s/p RCA PCI, HFrEF (EF 30% 1/2025), s/p Biotronik ICD, COPD (not on home O2) admitted to cardiac telemetry for HFrEF exacerbation, s/p IV diuresis, transitioning to PO today, pending possible discharge in AM.

## 2025-05-07 NOTE — DISCHARGE NOTE NURSING/CASE MANAGEMENT/SOCIAL WORK - PATIENT PORTAL LINK FT
You can access the FollowMyHealth Patient Portal offered by [facility] by registering at the following website: [email or web address]. By joining [employer]’s FollowMyHealth portal, you will also be able to view your health information using other applications (apps) compatible with our system.

## 2025-05-07 NOTE — PROGRESS NOTE ADULT - PROBLEM SELECTOR PLAN 1
Type 2 diabetes mellitus with hyperglycemia  - Please decrease lantus to 10 units at bedtime.   - Increase lispro to 8 units before each meal.  - Continue lispro moderate w dose sliding scale before meals and at bedtime.  - Patient's fingerstick glucose goal is 100-180 mg/dL.    - CDCES consulted.  - For discharge: Lantus 10 at bedtime and lispro 8 before meals.  - Patient can follow up at discharge with [facility]ocr[facility]ology:  He prefers to follow with a [facility] diabetologist in [place] on discharge:  1) Advised him to ask his [place] cardiologist for endocrine referral, OT  2) Nico Astorga M.D. endocrinology  [address] [place], [place] [id number] [phone]    Case discussed with Dr. Norman. Primary team updated.

## 2025-05-07 NOTE — PROGRESS NOTE ADULT - PROBLEM SELECTOR PLAN 2
INTERPRETATION:  THYROID ULTRASOUND with Doppler dated 2/1/2023 9:04 PM  IMPRESSION:  Enlarged multinodular thyroid gland. The 3 thyroid nodules detailed above   meet criteria for fine-needle aspiration biopsy.  Patient reports having b/l FNA several years ago that was benign.  Thyroid Stimulating Hormone, Serum: 0.371 (05-03-25)  Total T4/Free T4: --/1.460 (05-03-25)    Outpatient monitoring needed.

## 2025-05-13 NOTE — CHART NOTE - NSCHARTNOTEFT_GEN_A_CORE
Post-Discharge Medication Review: Completed	  	  Patient's preferred pharmacy was updated in [facility]: [facility] 	  	  Patient contacted to offer medication counseling post-discharge. Medication reconciliation completed. Per patient, medications include:	  	  1.	aspirin 81 mg oral delayed release tablet 1 tab(s) orally once a day  2.	atorvastatin 40 mg oral tablet 1 tab(s) orally once a day (at bedtime)  3.	clopidogrel 75 mg oral tablet 1 tab(s) orally once a day  4.	Entresto 97 mg-103 mg oral tablet 1 tab(s) orally 2 times a day  5.	Fiasp 100 units/mL injectable solution 8 unit(s) injectable 3 times a day (before meals)  6.	furosemide 40 mg oral tablet 1 tab(s) orally 2 times a day Please switch to one time a day when you notice that your daily weight has dropped by 3lbs. Your weight on the day of discharge was 194.4 lbs.  7.	insulin glargine 100 units/mL subcutaneous solution 10 unit(s) subcutaneous once a day (at bedtime)  8.	Metoprolol Succinate ER 50 mg oral tablet, extended release 1 tab(s) orally once a day  9.	spironolactone 25 mg oral tablet 1 tab(s) orally once a day  	  Medication name, indication, administration, side effect, and monitoring reviewed for new medications during post discharge counseling visit with patient. Patient demonstrated understanding. Counseling offered for all medications.	  	  Aashish Hinojosa, ValdoD	  Clinical Pharmacy Specialist, Pharmacy Telehealth Team	  Can be reached via [place] Teams or [phone]

## 2025-05-14 DIAGNOSIS — Z79.84 LONG TERM (CURRENT) USE OF ORAL HYPOGLYCEMIC DRUGS: ICD-10-CM

## 2025-05-14 DIAGNOSIS — E04.1 NONTOXIC SINGLE THYROID NODULE: ICD-10-CM

## 2025-05-14 DIAGNOSIS — I13.0 HYPERTENSIVE HEART AND CHRONIC KIDNEY DISEASE WITH HEART FAILURE AND STAGE 1 THROUGH STAGE 4 CHRONIC KIDNEY DISEASE, OR UNSPECIFIED CHRONIC KIDNEY DISEASE: ICD-10-CM

## 2025-05-14 DIAGNOSIS — Z95.5 PRESENCE OF CORONARY ANGIOPLASTY IMPLANT AND GRAFT: ICD-10-CM

## 2025-05-14 DIAGNOSIS — R21 RASH AND OTHER NONSPECIFIC SKIN ERUPTION: ICD-10-CM

## 2025-05-14 DIAGNOSIS — E11.65 TYPE 2 DIABETES MELLITUS WITH HYPERGLYCEMIA: ICD-10-CM

## 2025-05-14 DIAGNOSIS — Z95.810 PRESENCE OF AUTOMATIC (IMPLANTABLE) CARDIAC DEFIBRILLATOR: ICD-10-CM

## 2025-05-14 DIAGNOSIS — Z79.02 LONG TERM (CURRENT) USE OF ANTITHROMBOTICS/ANTIPLATELETS: ICD-10-CM

## 2025-05-14 DIAGNOSIS — J44.9 CHRONIC OBSTRUCTIVE PULMONARY DISEASE, UNSPECIFIED: ICD-10-CM

## 2025-05-14 DIAGNOSIS — N18.30 CHRONIC KIDNEY DISEASE, STAGE 3 UNSPECIFIED: ICD-10-CM

## 2025-05-14 DIAGNOSIS — E78.5 HYPERLIPIDEMIA, UNSPECIFIED: ICD-10-CM

## 2025-05-14 DIAGNOSIS — F17.210 NICOTINE DEPENDENCE, CIGARETTES, UNCOMPLICATED: ICD-10-CM

## 2025-05-14 DIAGNOSIS — Z79.82 LONG TERM (CURRENT) USE OF ASPIRIN: ICD-10-CM

## 2025-05-14 DIAGNOSIS — E11.22 TYPE 2 DIABETES MELLITUS WITH DIABETIC CHRONIC KIDNEY DISEASE: ICD-10-CM

## 2025-05-14 DIAGNOSIS — Z79.4 LONG TERM (CURRENT) USE OF INSULIN: ICD-10-CM

## 2025-05-14 DIAGNOSIS — I25.10 ATHEROSCLEROTIC HEART DISEASE OF NATIVE CORONARY ARTERY WITHOUT ANGINA PECTORIS: ICD-10-CM

## 2025-05-14 DIAGNOSIS — I50.23 ACUTE ON CHRONIC SYSTOLIC (CONGESTIVE) HEART FAILURE: ICD-10-CM

## 2025-05-21 ENCOUNTER — APPOINTMENT (OUTPATIENT)
Dept: HEART FAILURE | Facility: CLINIC | Age: 65
End: 2025-05-21
Payer: MEDICARE

## 2025-05-21 VITALS
BODY MASS INDEX: 30.53 KG/M2 | HEIGHT: 66 IN | SYSTOLIC BLOOD PRESSURE: 109 MMHG | TEMPERATURE: 97.8 F | WEIGHT: 190 LBS | OXYGEN SATURATION: 100 % | DIASTOLIC BLOOD PRESSURE: 70 MMHG | HEART RATE: 84 BPM

## 2025-05-21 DIAGNOSIS — I25.10 ATHEROSCLEROTIC HEART DISEASE OF NATIVE CORONARY ARTERY W/OUT ANGINA PECTORIS: ICD-10-CM

## 2025-05-21 DIAGNOSIS — I50.22 CHRONIC SYSTOLIC (CONGESTIVE) HEART FAILURE: ICD-10-CM

## 2025-05-21 DIAGNOSIS — E11.37X3 TYPE 2 DIABETES MELLITUS WITH DIABETIC MACULAR EDEMA, RESOLVED FOLLOWING TREATMENT, BILATERAL: ICD-10-CM

## 2025-05-21 DIAGNOSIS — I42.0 DILATED CARDIOMYOPATHY: ICD-10-CM

## 2025-05-21 DIAGNOSIS — Z79.4 TYPE 2 DIABETES MELLITUS WITH DIABETIC MACULAR EDEMA, RESOLVED FOLLOWING TREATMENT, BILATERAL: ICD-10-CM

## 2025-05-21 PROCEDURE — 99214 OFFICE O/P EST MOD 30 MIN: CPT

## 2025-05-21 RX ORDER — SPIRONOLACTONE 25 MG/1
25 TABLET ORAL
Qty: 60 | Refills: 2 | Status: ACTIVE | COMMUNITY
Start: 2025-05-21 | End: 1900-01-01

## 2025-06-24 ENCOUNTER — APPOINTMENT (OUTPATIENT)
Dept: HEART FAILURE | Facility: CLINIC | Age: 65
End: 2025-06-24
Payer: MEDICARE

## 2025-06-24 ENCOUNTER — LABORATORY RESULT (OUTPATIENT)
Age: 65
End: 2025-06-24

## 2025-06-24 VITALS
BODY MASS INDEX: 28.61 KG/M2 | HEIGHT: 66 IN | SYSTOLIC BLOOD PRESSURE: 109 MMHG | DIASTOLIC BLOOD PRESSURE: 73 MMHG | WEIGHT: 178 LBS | HEART RATE: 85 BPM | TEMPERATURE: 97.8 F | OXYGEN SATURATION: 97 %

## 2025-06-24 PROBLEM — E04.9 GOITER: Status: ACTIVE | Noted: 2025-06-24

## 2025-06-24 LAB
CHOLEST SERPL-MCNC: 147 MG/DL
HDLC SERPL-MCNC: 28 MG/DL
LDLC SERPL-MCNC: 100 MG/DL
NONHDLC SERPL-MCNC: 118 MG/DL
NT-PROBNP SERPL-MCNC: 2548 PG/ML
T3FREE SERPL-MCNC: 3.64 PG/ML
T3RU NFR SERPL: 1 TBI
T4 FREE SERPL-MCNC: 1.3 NG/DL
T4 SERPL-MCNC: 7.8 UG/DL
TRIGL SERPL-MCNC: 100 MG/DL
TSH SERPL-ACNC: 0.25 UIU/ML

## 2025-06-24 PROCEDURE — 99215 OFFICE O/P EST HI 40 MIN: CPT

## 2025-06-25 LAB
ANION GAP SERPL CALC-SCNC: 17 MMOL/L
BUN SERPL-MCNC: 17 MG/DL
CALCIUM SERPL-MCNC: 9.4 MG/DL
CHLORIDE SERPL-SCNC: 98 MMOL/L
CO2 SERPL-SCNC: 24 MMOL/L
CREAT SERPL-MCNC: 1.58 MG/DL
EGFRCR SERPLBLD CKD-EPI 2021: 49 ML/MIN/1.73M2
GLUCOSE SERPL-MCNC: 230 MG/DL
POTASSIUM SERPL-SCNC: 4.5 MMOL/L
SODIUM SERPL-SCNC: 138 MMOL/L

## 2025-06-25 RX ORDER — VERICIGUAT 2.5 MG/1
2.5 TABLET, FILM COATED ORAL
Qty: 30 | Refills: 0 | Status: ACTIVE | COMMUNITY
Start: 2025-06-25 | End: 1900-01-01

## 2025-07-23 ENCOUNTER — APPOINTMENT (OUTPATIENT)
Dept: HEART FAILURE | Facility: CLINIC | Age: 65
End: 2025-07-23
Payer: MEDICARE

## 2025-07-23 VITALS
WEIGHT: 177 LBS | TEMPERATURE: 97.4 F | DIASTOLIC BLOOD PRESSURE: 72 MMHG | OXYGEN SATURATION: 100 % | SYSTOLIC BLOOD PRESSURE: 105 MMHG | HEIGHT: 66 IN | BODY MASS INDEX: 28.45 KG/M2 | HEART RATE: 104 BPM

## 2025-07-23 DIAGNOSIS — J44.9 CHRONIC OBSTRUCTIVE PULMONARY DISEASE, UNSPECIFIED: ICD-10-CM

## 2025-07-23 DIAGNOSIS — I25.10 ATHEROSCLEROTIC HEART DISEASE OF NATIVE CORONARY ARTERY W/OUT ANGINA PECTORIS: ICD-10-CM

## 2025-07-23 DIAGNOSIS — E11.9 TYPE 2 DIABETES MELLITUS W/OUT COMPLICATIONS: ICD-10-CM

## 2025-07-23 DIAGNOSIS — I50.22 CHRONIC SYSTOLIC (CONGESTIVE) HEART FAILURE: ICD-10-CM

## 2025-07-23 PROCEDURE — 99213 OFFICE O/P EST LOW 20 MIN: CPT

## 2025-07-23 RX ORDER — VERICIGUAT 2.5 MG/1
2.5 TABLET, FILM COATED ORAL
Qty: 30 | Refills: 0 | Status: ACTIVE | COMMUNITY
Start: 2025-07-23 | End: 1900-01-01

## 2025-07-23 RX ORDER — INSULIN LISPRO 100 [IU]/ML
100 INJECTION, SOLUTION INTRAVENOUS; SUBCUTANEOUS
Refills: 0 | Status: ACTIVE | COMMUNITY
Start: 2025-07-23

## 2025-07-23 RX ORDER — CLOPIDOGREL 75 MG/1
75 TABLET, FILM COATED ORAL DAILY
Refills: 0 | Status: ACTIVE | COMMUNITY
Start: 2025-07-23

## 2025-08-21 ENCOUNTER — APPOINTMENT (OUTPATIENT)
Dept: ENDOCRINOLOGY | Facility: CLINIC | Age: 65
End: 2025-08-21
Payer: MEDICARE

## 2025-08-21 VITALS
BODY MASS INDEX: 28.45 KG/M2 | HEART RATE: 73 BPM | SYSTOLIC BLOOD PRESSURE: 107 MMHG | WEIGHT: 177 LBS | TEMPERATURE: 98.1 F | OXYGEN SATURATION: 97 % | DIASTOLIC BLOOD PRESSURE: 69 MMHG | HEIGHT: 66 IN | RESPIRATION RATE: 16 BRPM

## 2025-08-21 DIAGNOSIS — E11.9 TYPE 2 DIABETES MELLITUS W/OUT COMPLICATIONS: ICD-10-CM

## 2025-08-21 DIAGNOSIS — E05.90 THYROTOXICOSIS, UNSPECIFIED W/OUT THYROTOXIC CRISIS OR STORM: ICD-10-CM

## 2025-08-21 DIAGNOSIS — E04.9 NONTOXIC GOITER, UNSPECIFIED: ICD-10-CM

## 2025-08-21 LAB
GLUCOSE BLDC GLUCOMTR-MCNC: 278
HBA1C MFR BLD HPLC: 9.7

## 2025-08-21 PROCEDURE — 82962 GLUCOSE BLOOD TEST: CPT

## 2025-08-21 PROCEDURE — 99214 OFFICE O/P EST MOD 30 MIN: CPT

## 2025-08-21 PROCEDURE — 36415 COLL VENOUS BLD VENIPUNCTURE: CPT

## 2025-08-21 PROCEDURE — G2211 COMPLEX E/M VISIT ADD ON: CPT

## 2025-08-21 PROCEDURE — 83036 HEMOGLOBIN GLYCOSYLATED A1C: CPT | Mod: QW

## 2025-08-21 RX ORDER — BLOOD-GLUCOSE,RECEIVER,CONT
EACH MISCELLANEOUS
Qty: 1 | Refills: 0 | Status: ACTIVE | COMMUNITY
Start: 2025-08-21 | End: 1900-01-01

## 2025-08-21 RX ORDER — BLOOD-GLUCOSE SENSOR
EACH MISCELLANEOUS
Qty: 6 | Refills: 3 | Status: ACTIVE | COMMUNITY
Start: 2025-08-21 | End: 1900-01-01

## 2025-08-21 RX ORDER — TIRZEPATIDE 5 MG/.5ML
5 INJECTION, SOLUTION SUBCUTANEOUS
Qty: 3 | Refills: 3 | Status: ACTIVE | COMMUNITY
Start: 2025-08-21 | End: 1900-01-01

## 2025-08-22 ENCOUNTER — APPOINTMENT (OUTPATIENT)
Dept: PULMONOLOGY | Facility: CLINIC | Age: 65
End: 2025-08-22
Payer: MEDICARE

## 2025-08-22 VITALS
SYSTOLIC BLOOD PRESSURE: 137 MMHG | WEIGHT: 177 LBS | DIASTOLIC BLOOD PRESSURE: 83 MMHG | HEIGHT: 66 IN | OXYGEN SATURATION: 100 % | BODY MASS INDEX: 28.45 KG/M2 | HEART RATE: 103 BPM

## 2025-08-22 DIAGNOSIS — G47.30 SLEEP APNEA, UNSPECIFIED: ICD-10-CM

## 2025-08-22 DIAGNOSIS — F17.200 NICOTINE DEPENDENCE, UNSPECIFIED, UNCOMPLICATED: ICD-10-CM

## 2025-08-22 DIAGNOSIS — Z87.891 PERSONAL HISTORY OF NICOTINE DEPENDENCE: ICD-10-CM

## 2025-08-22 LAB
ALBUMIN, RANDOM URINE: 13.8 MG/DL
CREAT SPEC-SCNC: 112 MG/DL
MICROALBUMIN/CREAT 24H UR-RTO: 123 MG/G
T4 FREE SERPL-MCNC: 1.2 NG/DL
THYROGLOB AB SERPL-ACNC: 15.9 IU/ML
THYROPEROXIDASE AB SERPL IA-ACNC: 12.1 IU/ML
TSH RECEPTOR AB: <1.1 IU/L
TSH SERPL-ACNC: 0.46 UIU/ML

## 2025-08-22 PROCEDURE — ZZZZZ: CPT

## 2025-08-22 RX ORDER — NICOTINE POLACRILEX 2 MG/1
2 GUM, CHEWING BUCCAL
Qty: 1 | Refills: 2 | Status: ACTIVE | COMMUNITY
Start: 2025-08-22 | End: 1900-01-01

## 2025-08-25 ENCOUNTER — APPOINTMENT (OUTPATIENT)
Dept: FAMILY MEDICINE | Facility: CLINIC | Age: 65
End: 2025-08-25

## 2025-08-25 LAB — TSI ACT/NOR SER: <0.1 IU/L

## 2025-09-04 ENCOUNTER — APPOINTMENT (OUTPATIENT)
Dept: FAMILY MEDICINE | Facility: CLINIC | Age: 65
End: 2025-09-04
Payer: MEDICARE

## 2025-09-04 VITALS
OXYGEN SATURATION: 100 % | HEIGHT: 66 IN | SYSTOLIC BLOOD PRESSURE: 125 MMHG | HEART RATE: 85 BPM | WEIGHT: 176 LBS | DIASTOLIC BLOOD PRESSURE: 79 MMHG | TEMPERATURE: 97.3 F | BODY MASS INDEX: 28.28 KG/M2

## 2025-09-04 DIAGNOSIS — Z87.891 PERSONAL HISTORY OF NICOTINE DEPENDENCE: ICD-10-CM

## 2025-09-04 DIAGNOSIS — I10 ESSENTIAL (PRIMARY) HYPERTENSION: ICD-10-CM

## 2025-09-04 DIAGNOSIS — I25.10 ATHEROSCLEROTIC HEART DISEASE OF NATIVE CORONARY ARTERY W/OUT ANGINA PECTORIS: ICD-10-CM

## 2025-09-04 DIAGNOSIS — F17.200 NICOTINE DEPENDENCE, UNSPECIFIED, UNCOMPLICATED: ICD-10-CM

## 2025-09-04 DIAGNOSIS — E11.9 TYPE 2 DIABETES MELLITUS W/OUT COMPLICATIONS: ICD-10-CM

## 2025-09-04 DIAGNOSIS — Z87.09 PERSONAL HISTORY OF OTHER DISEASES OF THE RESPIRATORY SYSTEM: ICD-10-CM

## 2025-09-04 DIAGNOSIS — I50.22 CHRONIC SYSTOLIC (CONGESTIVE) HEART FAILURE: ICD-10-CM

## 2025-09-04 DIAGNOSIS — K21.9 GASTRO-ESOPHAGEAL REFLUX DISEASE W/OUT ESOPHAGITIS: ICD-10-CM

## 2025-09-04 DIAGNOSIS — Z12.11 ENCOUNTER FOR SCREENING FOR MALIGNANT NEOPLASM OF COLON: ICD-10-CM

## 2025-09-04 PROCEDURE — 99204 OFFICE O/P NEW MOD 45 MIN: CPT

## 2025-09-04 PROCEDURE — G2211 COMPLEX E/M VISIT ADD ON: CPT

## 2025-09-04 RX ORDER — FAMOTIDINE 20 MG/1
20 TABLET, FILM COATED ORAL
Qty: 120 | Refills: 1 | Status: ACTIVE | COMMUNITY
Start: 2025-09-04 | End: 1900-01-01

## 2025-09-09 ENCOUNTER — APPOINTMENT (OUTPATIENT)
Dept: HEART FAILURE | Facility: CLINIC | Age: 65
End: 2025-09-09

## 2025-09-09 ENCOUNTER — NON-APPOINTMENT (OUTPATIENT)
Age: 65
End: 2025-09-09